# Patient Record
Sex: FEMALE | Race: BLACK OR AFRICAN AMERICAN | Employment: UNEMPLOYED | URBAN - METROPOLITAN AREA
[De-identification: names, ages, dates, MRNs, and addresses within clinical notes are randomized per-mention and may not be internally consistent; named-entity substitution may affect disease eponyms.]

---

## 2017-10-30 ENCOUNTER — APPOINTMENT (OUTPATIENT)
Dept: CT IMAGING | Age: 47
End: 2017-10-30
Attending: EMERGENCY MEDICINE
Payer: MEDICAID

## 2017-10-30 ENCOUNTER — HOSPITAL ENCOUNTER (EMERGENCY)
Age: 47
Discharge: HOME | End: 2017-10-30
Attending: EMERGENCY MEDICINE | Admitting: EMERGENCY MEDICINE
Payer: MEDICAID

## 2017-10-30 VITALS
TEMPERATURE: 98.8 F | BODY MASS INDEX: 25.61 KG/M2 | HEIGHT: 64 IN | OXYGEN SATURATION: 100 % | RESPIRATION RATE: 20 BRPM | WEIGHT: 150 LBS | SYSTOLIC BLOOD PRESSURE: 114 MMHG | HEART RATE: 77 BPM | DIASTOLIC BLOOD PRESSURE: 72 MMHG

## 2017-10-30 DIAGNOSIS — R51.9 ACUTE NONINTRACTABLE HEADACHE, UNSPECIFIED HEADACHE TYPE: Primary | ICD-10-CM

## 2017-10-30 PROCEDURE — 70450 CT HEAD/BRAIN W/O DYE: CPT

## 2017-10-30 PROCEDURE — 99282 EMERGENCY DEPT VISIT SF MDM: CPT

## 2017-10-30 RX ORDER — BUTALBITAL, ASPIRIN, CAFFEINE AND CODEINE PHOSPHATE 50; 325; 40; 30 MG/1; MG/1; MG/1; MG/1
1 CAPSULE ORAL
Qty: 20 CAP | Refills: 0 | Status: SHIPPED | OUTPATIENT
Start: 2017-10-30 | End: 2018-01-28

## 2017-10-30 NOTE — ED NOTES
Pt instructed to follow up with her PCP, take medication as prescribed, and to return for worsening pain/hemparesis/confusion/slurred speech/\"worst\" headache ever. No distress noted.

## 2017-10-30 NOTE — ED PROVIDER NOTES
HPI Comments: Pt c/o rt forehead pain. X one day, woke up w it. 10/10, says h/o freq h/a's but this feels different. No nausea. No injury. No weakness or numbness. No redness or swelling. No fever or cough. Tried motrin, not helping. Pain waxing and waning. No neck or back pain. Not pregnant per pt. Patient is a 49 y.o. female presenting with migraines. Migraine    Pertinent negatives include no fever, no shortness of breath and no vomiting. Past Medical History:   Diagnosis Date    Cigarette smoker     Dyspnea on exertion Aug 2011    Negative cardiac and pulmonary workup    Heart murmur     History of gestational diabetes     Musculoskeletal disorder     DDD    Neurological disorder     migraines       Past Surgical History:   Procedure Laterality Date    HX  SECTION      HX TUBAL LIGATION           Family History:   Problem Relation Age of Onset    Heart Attack Mother 61    Cancer Mother     Hypertension Mother     Coronary Artery Disease Mother     Cancer Other     Heart Disease Other     Stroke Other     Diabetes Other     Hypertension Other     Psychiatric Disorder Other        Social History     Social History    Marital status:      Spouse name: N/A    Number of children: N/A    Years of education: N/A     Occupational History    Not on file. Social History Main Topics    Smoking status: Current Every Day Smoker     Packs/day: 1.00     Years: 10.00    Smokeless tobacco: Never Used    Alcohol use No    Drug use: No      Comment: Past history of crack, cocaine use    Sexual activity: Yes     Partners: Male     Birth control/ protection: None     Other Topics Concern    Not on file     Social History Narrative         ALLERGIES: Review of patient's allergies indicates no known allergies. Review of Systems   Constitutional: Negative for fever. HENT: Negative for congestion. Respiratory: Negative for cough and shortness of breath. Cardiovascular: Negative for chest pain. Gastrointestinal: Negative for abdominal pain and vomiting. Musculoskeletal: Negative for back pain. Skin: Negative for rash. Neurological: Positive for headaches. Negative for light-headedness. All other systems reviewed and are negative. Vitals:    10/30/17 0102   BP: 114/72   Pulse: 77   Resp: 20   Temp: 98.8 °F (37.1 °C)   SpO2: 100%   Weight: 68 kg (150 lb)   Height: 5' 4\" (1.626 m)            Physical Exam   Constitutional: She is oriented to person, place, and time. She appears well-developed. HENT:   Head: Normocephalic. Mouth/Throat: Oropharynx is clear and moist.   Eyes: Pupils are equal, round, and reactive to light. Neck: Normal range of motion. Cardiovascular: Normal rate and normal heart sounds. No murmur heard. Pulmonary/Chest: Effort normal. She has no wheezes. She has no rales. Abdominal: Soft. There is no tenderness. Musculoskeletal: Normal range of motion. She exhibits no edema. Neurological: She is alert and oriented to person, place, and time. Skin: Skin is warm and dry. Nursing note and vitals reviewed. MDM  ED Course       Procedures    Vitals:  No data found. Medications ordered:   Medications - No data to display      Lab findings:  No results found for this or any previous visit (from the past 12 hour(s)). X-Ray, CT or other radiology findings or impressions:  CT HEAD WO CONT   Final Result          Progress notes, Consult notes or additional Procedure notes:     Long discussion with patient regarding risks/benefits of cat scan. Risks discussed including risk of cancer from radiation/fibrosis. Pt verbalizes understanding of risks and strongly wants ct, pt is very concerned regarding the pain. 2:22 AM neg w/u, no emc, stable for dc and close f/u. Det ret inst given . Disposition:  Diagnosis:   1.  Acute nonintractable headache, unspecified headache type        Disposition: home    Follow-up Information     Follow up With Details Comments [address] Schedule an appointment as soon as possible for a visit in 2 days  [address] [place] [address]  [phone]           Discharge Medication List as of 10/30/2017  2:22 AM      START taking these medications    Details   codeine-butalbital-aspirin-caffeine (FIORINAL-CODEINE #3) 45--40 mg per capsule Take 1 Cap by mouth every four (4) hours as needed for Pain for up to 90 days. Max Daily Amount: 6 Caps.  Maximum 6 capsules daily, Print, Disp-20 Cap, R-0

## 2017-10-30 NOTE — ED TRIAGE NOTES
Pt. Complains of a sharp pain in her head, states she has migraines but says this is not a migraine.

## 2017-10-30 NOTE — DISCHARGE INSTRUCTIONS
Return for pain, fever, shortness of breath, vomiting, decreased fluid intake, weakness, numbness, dizziness, or any change or concerns. Headache: Care Instructions  Your Care Instructions    Headaches have many possible causes. Most headaches aren't a sign of a more serious problem, and they will get better on their own. Home treatment may help you feel better faster. The doctor has checked you carefully, but problems can develop later. If you notice any problems or new symptoms, get medical treatment right away. Follow-up care is a key part of your treatment and safety. Be sure to make and go to all appointments, and call your doctor if you are having problems. It's also a good idea to know your test results and keep a list of the medicines you take. How can you care for yourself at home? · Do not drive if you have taken a prescription pain medicine. · Rest in a quiet, dark room until your headache is gone. Close your eyes and try to relax or go to sleep. Don't watch TV or read. · Put a cold, moist cloth or cold pack on the painful area for 10 to 20 minutes at a time. Put a thin cloth between the cold pack and your skin. · Use a warm, moist towel or a heating pad set on low to relax tight shoulder and neck muscles. · Have someone gently massage your neck and shoulders. · Take pain medicines exactly as directed. ¨ If the doctor gave you a prescription medicine for pain, take it as prescribed. ¨ If you are not taking a prescription pain medicine, ask your doctor if you can take an over-the-counter medicine. · Be careful not to take pain medicine more often than the instructions allow, because you may get worse or more frequent headaches when the medicine wears off. · Do not ignore new symptoms that occur with a headache, such as a fever, weakness or numbness, vision changes, or confusion. These may be signs of a more serious problem.   To prevent headaches  · Keep a headache diary so you can figure out what triggers your headaches. Avoiding triggers may help you prevent headaches. Record when each headache began, how long it lasted, and what the pain was like (throbbing, aching, stabbing, or dull). Write down any other symptoms you had with the headache, such as nausea, flashing lights or dark spots, or sensitivity to bright light or loud noise. Note if the headache occurred near your period. List anything that might have triggered the headache, such as certain foods (chocolate, cheese, wine) or odors, smoke, bright light, stress, or lack of sleep. · Find healthy ways to deal with stress. Headaches are most common during or right after stressful times. Take time to relax before and after you do something that has caused a headache in the past.  · Try to keep your muscles relaxed by keeping good posture. Check your jaw, face, neck, and shoulder muscles for tension, and try relaxing them. When sitting at a desk, change positions often, and stretch for 30 seconds each hour. · Get plenty of sleep and exercise. · Eat regularly and well. Long periods without food can trigger a headache. · Treat yourself to a massage. Some people find that regular massages are very helpful in relieving tension. · Limit caffeine by not drinking too much coffee, tea, or soda. But don't quit caffeine suddenly, because that can also give you headaches. · Reduce eyestrain from computers by blinking frequently and looking away from the computer screen every so often. Make sure you have proper eyewear and that your monitor is set up properly, about an arm's length away. · Seek help if you have depression or anxiety. Your headaches may be linked to these conditions. Treatment can both prevent headaches and help with symptoms of anxiety or depression. When should you call for help? Call 911 anytime you think you may need emergency care. For example, call if:  ? · You have signs of a stroke.  These may include:  ¨ Sudden numbness, paralysis, or weakness in your face, arm, or leg, especially on only one side of your body. ¨ Sudden vision changes. ¨ Sudden trouble speaking. ¨ Sudden confusion or trouble understanding simple statements. ¨ Sudden problems with walking or balance. ¨ A sudden, severe headache that is different from past headaches. ?Call your doctor now or seek immediate medical care if:  ? · You have a new or worse headache. ? · Your headache gets much worse. Where can you learn more? Go to http://magige-kelvin.info/. Enter M271 in the search box to learn more about \"Headache: Care Instructions. \"  Current as of: October 14, 2016  Content Version: 11.4  © 2829-7452 [employer], [employer]. Care instructions adapted under license by [employer] (which disclaims liability or warranty for this information). If you have questions about a medical condition or this instruction, always ask your healthcare professional. [address] any warranty or liability for your use of this information.

## 2017-10-30 NOTE — LETTER
NOTIFICATION RETURN TO WORK / SCHOOL 
 
10/30/2017 2:21 AM 
 
Ms. Joseph Garcia [address] [facility] [address] [facility] [id number] To Whom It May Concern: 
 
Joseph Garcia is currently under the care of [address] [place] [address] EMERGENCY DEPT. She will return to work/school on: 11/1/17 If there are questions or concerns please have the patient contact our office.  
 
 
 
Sincerely, 
 
 
Trini Mendoza MD

## 2018-04-04 ENCOUNTER — HOSPITAL ENCOUNTER (OUTPATIENT)
Dept: MAMMOGRAPHY | Age: 48
Discharge: HOME | End: 2018-04-04
Attending: FAMILY MEDICINE
Payer: COMMERCIAL

## 2018-04-04 DIAGNOSIS — Z12.31 VISIT FOR SCREENING MAMMOGRAM: ICD-10-CM

## 2018-04-04 PROCEDURE — 77063 BREAST TOMOSYNTHESIS BI: CPT

## 2018-05-15 ENCOUNTER — DOCUMENTATION ONLY (OUTPATIENT)
Dept: SURGERY | Age: 48
End: 2018-05-15

## 2018-05-15 NOTE — PROGRESS NOTES
Contacted pt via phone regarding her family hx of breast ca as reported on her mammography questionnaire from her recent mammogram.  Reviewed the questionnaire w/ pt and her Tyrer-Cuzick score came out to be 20%. Therefore standards indicate she should have an annual breast MRI ordered in addition to an annual Mammogram.  It is also recommended that the patient have a clinical breast exam every 6 months. Pt verbalized understanding and would like to be evaluated by one of our surgeons for the high risk breast program.  Pt requested to be seen at [facility] and therefore will be scheduled w/ Dr. Carito Ca. Pt scheduled for appt on 5/23/18 at 0900.

## 2018-06-08 ENCOUNTER — OFFICE VISIT (OUTPATIENT)
Dept: SURGERY | Age: 48
End: 2018-06-08

## 2018-06-08 VITALS
HEART RATE: 71 BPM | WEIGHT: 150 LBS | TEMPERATURE: 98.7 F | RESPIRATION RATE: 18 BRPM | SYSTOLIC BLOOD PRESSURE: 102 MMHG | DIASTOLIC BLOOD PRESSURE: 68 MMHG | BODY MASS INDEX: 25.75 KG/M2

## 2018-06-08 DIAGNOSIS — N63.21 MASS OF UPPER OUTER QUADRANT OF LEFT BREAST: Primary | ICD-10-CM

## 2018-06-08 NOTE — PROGRESS NOTES
[facility]  General Surgery    Subjective:      HPI: Patient is a very pleasant 43-year-old female with a past medical history remarkable for degenerative disc disease of the musculoskeletal system, migraine headaches, cardiac murmur, tobacco abuse, dyspnea on exertion and gestational diabetes. She is referred by Carlos Enrique Garcia of the oncology nurse navigator after being identified as having an increased risk of developing breast cancer secondary to her family history. The patient has a 20% lifetime risk of developing breast cancer according to the [address] [place] [address]. She denies any unintentional weight loss. She denies any breast pain or nipple drainage or discharge. She has noted discoloration a bruising type of greenish appearance of the upper outer quadrant of the left breast.  She states that the mammography tech's noted this as well. I reviewed her bilateral screening 3D mammogram independently on the monitor. In the upper outer quadrant there is a dense area which appears fairly well-circumscribed which may be a fibroadenoma. The patient was given a BI-RADS 1 classification to this mammogram.  I will follow-up with a diagnostic ultrasound.     Patient Active Problem List    Diagnosis Date Noted    Murmur     Backache - h/o degenerative disc disease      Past Medical History:   Diagnosis Date    Cigarette smoker     Dyspnea on exertion Aug 2011    Negative cardiac and pulmonary workup    Heart murmur     History of gestational diabetes     Musculoskeletal disorder     DDD    Neurological disorder     migraines      Past Surgical History:   Procedure Laterality Date    HX  SECTION      HX TUBAL LIGATION        Family History   Problem Relation Age of Onset    Heart Attack Mother 61    Cancer Mother     Hypertension Mother     Coronary Artery Disease Mother     Cancer Other     Heart Disease Other     Stroke Other     Diabetes Other     Hypertension Other     Psychiatric Disorder Other       Social History   Substance Use Topics    Smoking status: Current Every Day Smoker     Packs/day: 1.00     Years: 10.00    Smokeless tobacco: Never Used    Alcohol use No      No Known Allergies    Prior to Admission medications    Not on File       Review of Systems:    14 systems were reviewed. The results are as above in the HPI and otherwise negative. Objective:     Vitals:    06/08/18 0937   BP: 102/68   Pulse: 71   Resp: 18   Temp: 98.7 °F (37.1 °C)   Weight: 68 kg (150 lb)       Physical Exam:  GENERAL: alert, cooperative, no distress, appears stated age,   EYE: conjunctivae/corneas clear. PERRL, EOM's intact. THROAT & NECK: normal and no erythema or exudates noted. ,    LYMPHATIC: Cervical, supraclavicular, and axillary nodes normal. ,   LUNG: clear to auscultation bilaterally,   HEART: regular rate and rhythm, S1, S2 normal, no murmur, click, rub or gallop,   BREASTS: Left is larger than the right. 38C  Left: No dimpling, discoloration, nipple inversion or retractions. No axillary or supraclavicular lymphadenopathy. No discrete mass but the tissue in the upper outer quadrant is denser and feels thicker. Right: No dimpling, discoloration, nipple inversion or retractions. No axillary or supraclavicular lymphadenopathy. No mass  ABDOMEN: soft, non-tender. Bowel sounds normal. No masses,  no organomegaly,   EXTREMITIES:  extremities normal, atraumatic, no cyanosis or edema,   SKIN: Normal.,   NEUROLOGIC: AOx3. Cranial nerves 2-12 and sensation grossly intact. ,     Data Review:  to be done    Impression:     · Patient with an increased lifetime risk of developing breast cancer-20% secondary to family history of breast cancer in the mother. She has fullness and thickness in the left breast upper outer quadrant tissue with a bruised appearance to the skin which is concerning for possible underlying malignancy.     Plan:     · 3D diagnostic mammogram left breast to evaluate the changes in the upper outer quadrant of the left breast.  · We will call the patient with results to discuss procedure and testing.   · Monthly self breast exam

## 2018-06-08 NOTE — MR AVS SNAPSHOT
[address] [facility] [address] [facility] 
[phone] Patient: Joel Bond MRN: [id number] JBT:[id number] Visit Information Date & Time Provider Department Dept. Phone Encounter #  
 6/8/2018  9:30 AM Kraig Esposito  [address] [id number] Your Appointments 12/11/2018 10:00 AM  
Follow Up with Kraig Esposito MD  
[place] [address] ([facility]) Appt Note: 6m f/u  
 [address] [id number] [address] [facility] Upcoming Health Maintenance Date Due Pneumococcal 19-64 Medium Risk (1 of 1 - PPSV23) 4/1/1988 DTaP/Tdap/Td series (1 - Tdap) 4/1/1990 PAP AKA CERVICAL CYTOLOGY 4/1/1990 Influenza Age 5 to Adult 8/1/2018 Allergies as of 6/8/2018  Review Complete On: 6/8/2018 By: Kraig Esposito MD  
 No Known Allergies Current Immunizations  Never Reviewed Name Date  
 TB Skin Test (PPD) 1/1/2011 Not reviewed this visit You Were Diagnosed With   
  
 Codes Comments Mass of upper outer quadrant of left breast    -  Primary ICD-10-CM: Y69.42 ICD-9-CM: 611.72 Vitals BP Pulse Temp Resp Weight(growth percentile) BMI  
 102/68 71 98.7 °F (37.1 °C) 18 150 lb (68 kg) 25.75 kg/m2 OB Status Smoking Status Having regular periods Current Every Day Smoker Vitals History BMI and BSA Data Body Mass Index Body Surface Area 25.75 kg/m 2 1.75 m 2 Your Updated Medication List  
  
Notice  As of 6/8/2018 10:06 AM  
 You have not been prescribed any medications. Introducing [facility]! Nellie Akhtar introduces [employer] patient portal. Now you can access parts of your medical record, email your doctor's office, and request medication refills online. 1. In your internet browser, go to [email or web address] 2. Click on the First Time User? Click Here link in the Sign In box. You will see the New Member Sign Up page. 3. Enter your [employer] Access Code exactly as it appears below. You will not need to use this code after youve completed the sign-up process. If you do not sign up before the expiration date, you must request a new code. · [employer] Access Code: [email or web address] Expires: 6/28/2018  1:24 PM 
 
4. Enter the last four digits of your Social Security Number (xxxx) and Date of Birth (mm/dd/yyyy) as indicated and click Submit. You will be taken to the next sign-up page. 5. Create a [employer] ID. This will be your [employer] login ID and cannot be changed, so think of one that is secure and easy to remember. 6. Create a [employer] password. You can change your password at any time. 7. Enter your Password Reset Question and Answer. This can be used at a later time if you forget your password. 8. Enter your e-mail address. You will receive e-mail notification when new information is available in [address] 9. Click Sign Up. You can now view and download portions of your medical record. 10. Click the Download Summary menu link to download a portable copy of your medical information. If you have questions, please visit the Frequently Asked Questions section of the [employer] website. Remember, [employer] is NOT to be used for urgent needs. For medical emergencies, dial 911. Now available from your iPhone and Android! Please provide this summary of care documentation to your next provider. Your primary care clinician is listed as Vivek Paz . If you have any questions after today's visit, please call [phone].

## 2018-07-06 ENCOUNTER — HOSPITAL ENCOUNTER (OUTPATIENT)
Dept: ULTRASOUND IMAGING | Age: 48
Discharge: HOME | End: 2018-07-06
Attending: SURGERY
Payer: COMMERCIAL

## 2018-07-06 ENCOUNTER — HOSPITAL ENCOUNTER (OUTPATIENT)
Dept: MAMMOGRAPHY | Age: 48
Discharge: HOME | End: 2018-07-06
Attending: NURSE PRACTITIONER
Payer: COMMERCIAL

## 2018-07-06 DIAGNOSIS — N63.21 MASS OF UPPER OUTER QUADRANT OF LEFT BREAST: ICD-10-CM

## 2018-07-06 PROCEDURE — 76642 ULTRASOUND BREAST LIMITED: CPT

## 2018-07-06 PROCEDURE — 77061 BREAST TOMOSYNTHESIS UNI: CPT

## 2018-07-27 ENCOUNTER — OFFICE VISIT (OUTPATIENT)
Dept: SURGERY | Age: 48
End: 2018-07-27

## 2018-07-27 VITALS
BODY MASS INDEX: 25.44 KG/M2 | WEIGHT: 149 LBS | HEIGHT: 64 IN | SYSTOLIC BLOOD PRESSURE: 118 MMHG | RESPIRATION RATE: 17 BRPM | TEMPERATURE: 98.7 F | DIASTOLIC BLOOD PRESSURE: 62 MMHG | HEART RATE: 82 BPM | OXYGEN SATURATION: 99 %

## 2018-07-27 DIAGNOSIS — R92.8 ABNORMAL ULTRASOUND OF BREAST: Primary | ICD-10-CM

## 2018-07-27 NOTE — PROGRESS NOTES
[facility]  General Surgery    Subjective:  Patient presents today in follow-up to her left breast diagnostic mammogram and ultrasound. The ultrasound which I did review independently on the monitor was given a BI-RADS 4 classification due to a asymmetry. There was thickening of the skin of the upper outer quadrant of the left breast with some discoloration at the last visit which prompted the diagnostic studies. This thickening and discoloration are resolving. Objective:  Vitals:    07/27/18 1007   BP: 118/62   Pulse: 82   Resp: 17   Temp: 98.7 °F (37.1 °C)   TempSrc: Oral   SpO2: 99%   Weight: 67.6 kg (149 lb)   Height: 5' 4\" (1.626 m)       Physical Exam:    General: Awake and alert, oriented ×4, no apparent distress   Breasts:    Left: No dimpling, discoloration, nipple inversion or retractions. No axillary or supraclavicular lymphadenopathy. No mass     Current Medications:      Chart and notes reviewed. Data reviewed. I have evaluated and examined the patient. Impression and plan:  Patient with resolution of the skin changes of the left breast upper outer quadrant. I was unable to identify the asymmetry which was noted on the ultrasound from the [facility]. I discussed this with the radiologist present today Dr. Kendrick Banerjee. He agrees that if the skin changes are no longer present the findings present on the ultrasound and very suspicious.   Patient will follow with us in 3 months for reevaluation left breast exam.   Raina Delgado MD

## 2018-07-27 NOTE — PROGRESS NOTES
[facility]  General Surgery    Ultrasound of the left breast was performed from the 12-3  position in the upper outer quadrant. Fibrocystic changes within the breast tissue were present however I could not find the asymmetry which have been mentioned in the 1 o'clock position 9 cm from the nipple on the ultrasound report from the radiology department.

## 2018-09-09 ENCOUNTER — HOSPITAL ENCOUNTER (EMERGENCY)
Age: 48
Discharge: HOME | End: 2018-09-09
Attending: EMERGENCY MEDICINE
Payer: COMMERCIAL

## 2018-09-09 ENCOUNTER — APPOINTMENT (OUTPATIENT)
Dept: GENERAL RADIOLOGY | Age: 48
End: 2018-09-09
Attending: PHYSICIAN ASSISTANT
Payer: COMMERCIAL

## 2018-09-09 VITALS
OXYGEN SATURATION: 100 % | SYSTOLIC BLOOD PRESSURE: 110 MMHG | TEMPERATURE: 98.4 F | RESPIRATION RATE: 20 BRPM | DIASTOLIC BLOOD PRESSURE: 73 MMHG

## 2018-09-09 DIAGNOSIS — S16.1XXA CERVICAL STRAIN, ACUTE, INITIAL ENCOUNTER: Primary | ICD-10-CM

## 2018-09-09 DIAGNOSIS — S39.012A LOW BACK STRAIN, INITIAL ENCOUNTER: ICD-10-CM

## 2018-09-09 PROCEDURE — 74011250636 HC RX REV CODE- 250/636: Performed by: PHYSICIAN ASSISTANT

## 2018-09-09 PROCEDURE — 72040 X-RAY EXAM NECK SPINE 2-3 VW: CPT

## 2018-09-09 PROCEDURE — 96372 THER/PROPH/DIAG INJ SC/IM: CPT

## 2018-09-09 PROCEDURE — 99283 EMERGENCY DEPT VISIT LOW MDM: CPT

## 2018-09-09 PROCEDURE — 74011250637 HC RX REV CODE- 250/637: Performed by: PHYSICIAN ASSISTANT

## 2018-09-09 RX ORDER — CYCLOBENZAPRINE HCL 10 MG
10 TABLET ORAL
Status: COMPLETED | OUTPATIENT
Start: 2018-09-09 | End: 2018-09-09

## 2018-09-09 RX ORDER — CYCLOBENZAPRINE HCL 10 MG
10 TABLET ORAL
Qty: 15 TAB | Refills: 0 | Status: SHIPPED | OUTPATIENT
Start: 2018-09-09 | End: 2020-02-08

## 2018-09-09 RX ORDER — IBUPROFEN 200 MG
800 TABLET ORAL
COMMUNITY
End: 2019-10-03

## 2018-09-09 RX ORDER — KETOROLAC TROMETHAMINE 30 MG/ML
60 INJECTION, SOLUTION INTRAMUSCULAR; INTRAVENOUS
Status: COMPLETED | OUTPATIENT
Start: 2018-09-09 | End: 2018-09-09

## 2018-09-09 RX ADMIN — KETOROLAC TROMETHAMINE 60 MG: 30 INJECTION, SOLUTION INTRAMUSCULAR at 19:51

## 2018-09-09 RX ADMIN — CYCLOBENZAPRINE HYDROCHLORIDE 10 MG: 10 TABLET, FILM COATED ORAL at 19:51

## 2018-09-09 NOTE — ED PROVIDER NOTES
HPI Comments: Jada Motley is a 51 y.o. Female c/o back and neck pain after falling. She states she was sitting in a chair at an event when the chair broke and she fell backwards. Pt states she landed flat on her back. Pt was seen at [facility].  She states Xrays were done but she doesn't know what part of her neck or back was Xrayed. She was given Ibuprofen which she has been taking without relief. She c/o increased pain and stiffness to low back, neck, and Lt shoulder. No UE weakness, numbness, tingling, or swelling. Patient is a 51 y.o. female presenting with fall, shoulder injury, back pain, neck pain, and headaches. The history is provided by the patient. Fall The accident occurred more than 2 days ago. Fall occurred: while sitting/chair broke. She fell from a height of 1 - 2 ft. She landed on hard floor. There was no blood loss. Point of impact: back and neck. Pain location: back. The pain is at a severity of 7/10. The pain is moderate. She was ambulatory at the scene. There was no entrapment after the fall. There was no drug use involved in the accident. There was no alcohol use involved in the accident. Associated symptoms include headaches. Pertinent negatives include no visual change, no fever, no numbness, no abdominal pain, no nausea, no vomiting, no loss of consciousness and no tingling. The symptoms are aggravated by activity. She has tried NSAIDs for the symptoms. The treatment provided no relief. Shoulder Injury Pertinent negatives include no numbness and no tingling. Back Pain Associated symptoms include headaches. Pertinent negatives include no chest pain, no fever, no numbness, no abdominal pain, no tingling and no weakness. Neck Pain Associated symptoms include headaches. Pertinent negatives include no photophobia, no visual change, no chest pain, no numbness, no tingling and no weakness. Headache Pertinent negatives include no fever, no palpitations, no shortness of breath, no weakness, no tingling, no dizziness, no visual change, no nausea and no vomiting. Past Medical History:  
Diagnosis Date  Cigarette smoker  Dyspnea on exertion Aug 2011 Negative cardiac and pulmonary workup  Heart murmur  History of gestational diabetes  Musculoskeletal disorder DDD  Neurological disorder   
 migraines Past Surgical History:  
Procedure Laterality Date  HX  SECTION    
 HX TUBAL LIGATION Family History:  
Problem Relation Age of Onset  Heart Attack Mother 61  Cancer Mother  Hypertension Mother  Coronary Artery Disease Mother  Cancer Other  Heart Disease Other  Stroke Other  Diabetes Other  Hypertension Other  Psychiatric Disorder Other Social History Social History  Marital status:  Spouse name: N/A  
 Number of children: N/A  
 Years of education: N/A Occupational History  Not on file. Social History Main Topics  Smoking status: Current Every Day Smoker Packs/day: 1.00 Years: 10.00  Smokeless tobacco: Never Used  Alcohol use No  
 Drug use: No  
   Comment: Past history of crack, cocaine use  Sexual activity: Yes  
  Partners: Male Birth control/ protection: None Other Topics Concern  Not on file Social History Narrative ALLERGIES: Review of patient's allergies indicates no known allergies. Review of Systems Constitutional: Negative for appetite change, chills, diaphoresis and fever. HENT: Negative for congestion, rhinorrhea and sore throat. Eyes: Negative for photophobia and visual disturbance. Respiratory: Negative for cough and shortness of breath. Cardiovascular: Negative for chest pain and palpitations. Gastrointestinal: Negative for abdominal pain, diarrhea, nausea and vomiting. Musculoskeletal: Positive for arthralgias, back pain and neck pain. Negative for joint swelling, myalgias and neck stiffness. Skin: Negative. Allergic/Immunologic: Negative. Neurological: Positive for headaches. Negative for dizziness, tingling, loss of consciousness, syncope, weakness and numbness. Psychiatric/Behavioral: Negative. Vitals:  
 09/09/18 1853 BP: 110/73 Resp: 20 Temp: 98.4 °F (36.9 °C) SpO2: 100% Physical Exam  
Constitutional: She is oriented to person, place, and time. Vital signs are normal. She appears well-developed and well-nourished. She is cooperative. Non-toxic appearance. No distress. Pt ambulating slowly without assistance, steady gait HENT:  
Head: Normocephalic and atraumatic. Right Ear: Tympanic membrane, external ear and ear canal normal.  
Left Ear: Tympanic membrane, external ear and ear canal normal.  
Nose: Nose normal.  
Mouth/Throat: Uvula is midline, oropharynx is clear and moist and mucous membranes are normal.  
Eyes: Conjunctivae, EOM and lids are normal. Pupils are equal, round, and reactive to light. Neck: Normal range of motion and full passive range of motion without pain. Neck supple. Spinous process tenderness and muscular tenderness present. No rigidity. No edema and normal range of motion present. Cardiovascular: Normal rate, regular rhythm and normal heart sounds. Pulses: 
     Radial pulses are 2+ on the right side, and 2+ on the left side. Pulmonary/Chest: Effort normal and breath sounds normal. No respiratory distress. Abdominal: Soft. Normal appearance. There is no tenderness. There is no rebound. Musculoskeletal: Normal range of motion. She exhibits no edema. Cervical back: She exhibits tenderness, bony tenderness and spasm. She exhibits no swelling, no edema, no deformity and no laceration. Back: 
 
Midline tenderness to C6-C7, Tenderness and hypertonic muscles to LT SCM, 
 Full ROM bilat shoulders, elbows, wrists, forearms, fingers. , bicep, tricep strength 5/5 bilat Tenderness across lumbar area Lt, Rt, and midline Neurological: She is alert and oriented to person, place, and time. She has normal strength. No sensory deficit. Skin: Skin is warm and intact. No bruising, no ecchymosis and no rash noted. Psychiatric: She has a normal mood and affect. Her speech is normal and behavior is normal. Judgment and thought content normal. Cognition and memory are normal.  
Nursing note and vitals reviewed. MDM Number of Diagnoses or Management Options Cervical strain, acute, initial encounter: Low back strain, initial encounter:  
Diagnosis management comments: Pain to neck and low back 3 days s/p fall, landed directly onto back from sitting position. Continued pain, no relief with Motrin. Midline tenderness-check Cspine Xray for injury, give muscle relaxant. Likely muscle strains, no neuro/sensory deficits ED Course Procedures Vitals: 
Patient Vitals for the past 12 hrs: 
 Temp Resp BP SpO2  
09/09/18 1853 98.4 °F (36.9 °C) 20 110/73 100 % Medications ordered:  
Medications  
cyclobenzaprine (FLEXERIL) tablet 10 mg (10 mg Oral Given 9/9/18 1951)  
ketorolac tromethamine (TORADOL) 60 mg/2 mL injection 60 mg (60 mg IntraMUSCular Given 9/9/18 1951) Lab findings: 
No results found for this or any previous visit (from the past 12 hour(s)). X-Ray, CT or other radiology findings or impressions: XR SPINE CERV PA LAT ODONT 3 V MAX Final Result  
  
reviewed, no acute findings Reevaluation of patient:  
Pt resting in room, repots improved pain after Flexeril. Will discharge home. Disposition: 
Diagnosis: 1. Cervical strain, acute, initial encounter 2. Low back strain, initial encounter Disposition: home Follow-up Information Follow up With Details Comments Contact Info [id number] [facility] EMERGENCY DEPT  As needed, If symptoms worsen [address] [id number] [phone] [place], [place] Call in 2 days for re-evaluation [address] [facility] [place] [id number] 
[phone] Patient's Medications Start Taking CYCLOBENZAPRINE (FLEXERIL) 10 MG TABLET    Take 1 Tab by mouth three (3) times daily as needed for Muscle Spasm(s). Continue Taking IBUPROFEN (MOTRIN IB) 200 MG TABLET    Take 800 mg by mouth. These Medications have changed No medications on file Stop Taking No medications on file The patient will be discharged home. Warning signs of worsening condition were discussed and the patient verbalized understanding. Based on patient's age, coexisting illness, exam, and the results of this ED evaluation, the decision to treat as an outpatient was made. While it is impossible to completely exclude the possibility of underlying serious disease or worsening of condition, I feel the relative likelihood is extremely low. I discussed this uncertainty with the patient, who understood ED evaluation and treatment and felt comfortable with the outpatient treatment plan. All questions regarding care, test results, and follow up were answered. The patient is stable and appropriate to discharge. Patient understands importance to return to the emergency department for any new or worsening symptoms. I stressed the importance of follow up for repeat assessment and possibly further evaluation/treatment.  
 
Gaviota Barragan PA-C

## 2018-09-09 NOTE — ED TRIAGE NOTES
Pt states she fell out of a chair after it collapsed Thursday. Went to [facility]. Here today because she is still hurting. C/o head, neck, back and left shoulder pain. States all she was given was 800mg Motrin and all it does is make her drowsy. Also c/o left lower leg pain. Pt is here alone. States she did not drive here

## 2018-09-09 NOTE — LETTER
[facility] EMERGENCY DEPT 
[address] [facility] [id number] [phone] Work/School Note Date: 9/9/2018 To Whom It May concern: 
 
Adam Galicia was seen and treated today in the emergency room by the following provider(s): 
Attending Provider: Renaldo Arias MD 
Physician Assistant: Case Chan PA-C. Adam Galicia may return to work on 9/11/18.  
 
Sincerely, 
 
 
 
 
Case Chan PA-C

## 2018-09-10 NOTE — DISCHARGE INSTRUCTIONS

## 2018-09-10 NOTE — ED NOTES
Pt aware that Flexeril will make her drowsy and she will not be discharged until her ride has arrived to drive her home. Pt verbalized understanding and states that her son will be picking her up.

## 2018-09-10 NOTE — ED NOTES
I have reviewed discharge instructions with the patient. The patient verbalized understanding. Patient armband removed and given to patient to take home. Patient was informed of the privacy risks if armband lost or stolen Pt accompanied by son who is driving her home. Pt again instructed not to drive  While taking Flexeril.

## 2018-09-21 ENCOUNTER — TELEPHONE (OUTPATIENT)
Dept: SURGERY | Age: 48
End: 2018-09-21

## 2018-09-21 NOTE — TELEPHONE ENCOUNTER
Received letter from [facility] Radiologists recommending biopsy on patient. Patient was seen by Dr. Letha Wilcox on 7/27/2018 and is currently being followed by surgery.

## 2019-01-14 ENCOUNTER — HOSPITAL ENCOUNTER (EMERGENCY)
Age: 49
Discharge: HOME | End: 2019-01-14
Attending: EMERGENCY MEDICINE
Payer: COMMERCIAL

## 2019-01-14 VITALS
BODY MASS INDEX: 25.61 KG/M2 | DIASTOLIC BLOOD PRESSURE: 74 MMHG | HEART RATE: 87 BPM | SYSTOLIC BLOOD PRESSURE: 114 MMHG | RESPIRATION RATE: 16 BRPM | HEIGHT: 64 IN | OXYGEN SATURATION: 99 % | WEIGHT: 150 LBS | TEMPERATURE: 98.6 F

## 2019-01-14 DIAGNOSIS — M54.41 ACUTE MIDLINE LOW BACK PAIN WITH RIGHT-SIDED SCIATICA: Primary | ICD-10-CM

## 2019-01-14 PROCEDURE — 99282 EMERGENCY DEPT VISIT SF MDM: CPT

## 2019-01-14 RX ORDER — PREDNISONE 5 MG/1
TABLET ORAL
Qty: 21 TAB | Refills: 0 | Status: SHIPPED | OUTPATIENT
Start: 2019-01-14 | End: 2019-10-03

## 2019-01-14 RX ORDER — HYDROCODONE BITARTRATE AND ACETAMINOPHEN 5; 325 MG/1; MG/1
1 TABLET ORAL
Qty: 8 TAB | Refills: 0 | Status: SHIPPED | OUTPATIENT
Start: 2019-01-14 | End: 2019-10-03

## 2019-01-15 NOTE — ED TRIAGE NOTES
Patient presents to ER for C/O acute on chronic back pain. States back pain worsened on Friday. States she was injured in September, states she was diagnosed with 5 bulging disks.

## 2019-01-15 NOTE — ED PROVIDER NOTES
EMERGENCY DEPARTMENT HISTORY AND PHYSICAL EXAM 
 
9:28 PM 
 
 
Date: (Not on file) Patient Name: Kiara Escobar History of Presenting Illness Chief Complaint Patient presents with  Back Pain History Provided By: Patient Chief Complaint: back pain Duration: 3 Days Timing:  Acute on chronic Location: middle lower Quality: Aching Severity: 10 out of 10 Modifying Factors: motrin and flexeril Associated Symptoms: denies any other associated signs or symptoms Additional History (Context): Kiara Escobar is a 51 y.o. female with No significant past medical history who presents with back pain. She has history of work related injury and was diagnosed with 5 bulging discs. She has been seeing Dr. Cachorro Graham, spine specialist, and PT. The pain started worsening this weekend. Pain does not radiate into the legs. Patient denies incontinence, numbness in the groin, weakness/ numbness in the lower extremities. Denies dysuria. PCP: Deysi Arellano MD 
 
Current Outpatient Medications Medication Sig Dispense Refill  ibuprofen (MOTRIN IB) 200 mg tablet Take 800 mg by mouth.  cyclobenzaprine (FLEXERIL) 10 mg tablet Take 1 Tab by mouth three (3) times daily as needed for Muscle Spasm(s). 15 Tab 0 Past History Past Medical History: 
Past Medical History:  
Diagnosis Date  Cigarette smoker  Dyspnea on exertion Aug 2011 Negative cardiac and pulmonary workup  Heart murmur  History of gestational diabetes  Musculoskeletal disorder DDD  Neurological disorder   
 migraines Past Surgical History: 
Past Surgical History:  
Procedure Laterality Date  HX  SECTION    
 HX TUBAL LIGATION Family History: 
Family History Problem Relation Age of Onset  Heart Attack Mother 61  Cancer Mother  Hypertension Mother  Coronary Artery Disease Mother  Cancer Other  Heart Disease Other  Stroke Other  Diabetes Other  Hypertension Other  Psychiatric Disorder Other Social History: 
Social History Tobacco Use  Smoking status: Current Every Day Smoker Packs/day: 1.00 Years: 10.00 Pack years: 10.00  Smokeless tobacco: Never Used Substance Use Topics  Alcohol use: No  
 Drug use: No  
  Comment: Past history of crack, cocaine use Allergies: 
No Known Allergies Review of Systems Review of Systems Constitutional: Negative for fever. HENT: Negative for facial swelling. Eyes: Negative for visual disturbance. Respiratory: Negative for shortness of breath. Cardiovascular: Negative for chest pain. Gastrointestinal: Negative for abdominal pain. Genitourinary: Negative for dysuria. Musculoskeletal: Positive for back pain. Negative for neck pain. Skin: Negative for rash. Neurological: Negative for dizziness. Psychiatric/Behavioral: Negative for confusion. All other systems reviewed and are negative. Physical Exam  
There were no vitals taken for this visit. Physical Exam  
Constitutional: She is oriented to person, place, and time. She appears well-developed and well-nourished. No distress. HENT:  
Head: Normocephalic and atraumatic. Eyes: Conjunctivae are normal.  
Neck: Normal range of motion. Cardiovascular: Normal rate and regular rhythm. Pulmonary/Chest: Effort normal.  
Abdominal: She exhibits no distension. Musculoskeletal: Normal range of motion. Tenderness to lumbar spine, left lower back, and left buttocks.  + SLRT on left. Neurological: She is alert and oriented to person, place, and time. No sensory or motor deficits. Strength and sensation equal to bilateral upper and lower extremities. Skin: Skin is warm and dry. She is not diaphoretic. Psychiatric: She has a normal mood and affect. Nursing note and vitals reviewed. Diagnostic Study Results Labs - 
 No results found for this or any previous visit (from the past 12 hour(s)). Radiologic Studies - No orders to display Medical Decision Making I am the first provider for this patient. I reviewed the vital signs, available nursing notes, past medical history, past surgical history, family history and social history. Vital Signs-Reviewed the patient's vital signs. Records Reviewed: Nursing Notes (Time of Review: 9:28 PM) ED Course: Progress Notes, Reevaluation, and Consults: 
 
Provider Notes (Medical Decision Making): MDM Number of Diagnoses or Management Options Acute midline low back pain with right-sided sciatica:  
 
  
 
 Patient has no new complaints, changes, or physical findings. Care plan outlined and precautions discussed. Results were reviewed with the patient. All medications were reviewed with the patient; will d/c home with steroids and pain meds. All of pt's questions and concerns were addressed. Patient was instructed and agrees to follow up with ortho spine, as well as to return to the ED upon further deterioration. Patient is ready to go home. Diagnosis Clinical Impression: No diagnosis found. Disposition:  
 
 
Follow-up Information None Medication List  
  
ASK your doctor about these medications   
cyclobenzaprine 10 mg tablet Commonly known as:  FLEXERIL Take 1 Tab by mouth three (3) times daily as needed for Muscle Spasm(s). MOTRIN  mg tablet Generic drug:  ibuprofen 
  
  
 
_______________________________ Attestations: 
Scribe Attestation Robby Kendrick PA-C acting as a scribe for and in the presence of [facility]/[place], [place] January 14, 2019 at 9:28 PM 
    
Provider Attestation:     
I personally performed the services described in the documentation, reviewed the documentation, as recorded by the scribe in my presence, and it accurately and completely records my words and actions.  January 14, 2019 at 9:28 STAR Diaz PA-C 
_______________________________

## 2019-01-15 NOTE — DISCHARGE INSTRUCTIONS
Patient Education        Learning About Relief for Back Pain  What is back tension and strain? Back strain happens when you overstretch, or pull, a muscle in your back. You may hurt your back in an accident or when you exercise or lift something. Most back pain will get better with rest and time. You can take care of yourself at home to help your back heal.  What can you do first to relieve back pain? When you first feel back pain, try these steps:  · Walk. Take a short walk (10 to 20 minutes) on a level surface (no slopes, hills, or stairs) every 2 to 3 hours. Walk only distances you can manage without pain, especially leg pain. · Relax. Find a comfortable position for rest. Some people are comfortable on the floor or a medium-firm bed with a small pillow under their head and another under their knees. Some people prefer to lie on their side with a pillow between their knees. Don't stay in one position for too long. · Try heat or ice. Try using a heating pad on a low or medium setting, or take a warm shower, for 15 to 20 minutes every 2 to 3 hours. Or you can buy single-use heat wraps that last up to 8 hours. You can also try an ice pack for 10 to 15 minutes every 2 to 3 hours. You can use an ice pack or a bag of frozen vegetables wrapped in a thin towel. There is not strong evidence that either heat or ice will help, but you can try them to see if they help. You may also want to try switching between heat and cold. · Take pain medicine exactly as directed. ¨ If the doctor gave you a prescription medicine for pain, take it as prescribed. ¨ If you are not taking a prescription pain medicine, ask your doctor if you can take an over-the-counter medicine. What else can you do? · Stretch and exercise. Exercises that increase flexibility may relieve your pain and make it easier for your muscles to keep your spine in a good, neutral position. And don't forget to keep walking. · Do self-massage.  You can use self-massage to unwind after work or school or to energize yourself in the morning. You can easily massage your feet, hands, or neck. Self-massage works best if you are in comfortable clothes and are sitting or lying in a comfortable position. Use oil or lotion to massage bare skin. · Reduce stress. Back pain can lead to a vicious [place]: Distress about the pain tenses the muscles in your back, which in turn causes more pain. Learn how to relax your mind and your muscles to lower your stress. Where can you learn more? Go to http://maggie-kelvin.info/. Enter U242 in the search box to learn more about \"Learning About Relief for Back Pain. \"  Current as of: March 21, 2017  Content Version: 11.5  © 1038-2216 [employer]. Care instructions adapted under license by [employer] (which disclaims liability or warranty for this information). If you have questions about a medical condition or this instruction, always ask your healthcare professional. [address] any warranty or liability for your use of this information.

## 2019-03-14 ENCOUNTER — APPOINTMENT (OUTPATIENT)
Dept: GENERAL RADIOLOGY | Age: 49
End: 2019-03-14
Attending: EMERGENCY MEDICINE
Payer: SELF-PAY

## 2019-03-14 ENCOUNTER — HOSPITAL ENCOUNTER (EMERGENCY)
Age: 49
Discharge: HOME | End: 2019-03-14
Attending: EMERGENCY MEDICINE
Payer: SELF-PAY

## 2019-03-14 VITALS
RESPIRATION RATE: 20 BRPM | SYSTOLIC BLOOD PRESSURE: 110 MMHG | DIASTOLIC BLOOD PRESSURE: 73 MMHG | OXYGEN SATURATION: 100 % | TEMPERATURE: 97.8 F | HEART RATE: 85 BPM

## 2019-03-14 DIAGNOSIS — G89.29 CHRONIC LOW BACK PAIN WITHOUT SCIATICA, UNSPECIFIED BACK PAIN LATERALITY: ICD-10-CM

## 2019-03-14 DIAGNOSIS — R42 LIGHTHEADEDNESS: Primary | ICD-10-CM

## 2019-03-14 DIAGNOSIS — M54.50 CHRONIC LOW BACK PAIN WITHOUT SCIATICA, UNSPECIFIED BACK PAIN LATERALITY: ICD-10-CM

## 2019-03-14 PROCEDURE — 93005 ELECTROCARDIOGRAM TRACING: CPT

## 2019-03-14 PROCEDURE — 71045 X-RAY EXAM CHEST 1 VIEW: CPT

## 2019-03-14 PROCEDURE — 99285 EMERGENCY DEPT VISIT HI MDM: CPT

## 2019-03-14 NOTE — ED TRIAGE NOTES
Patient arrived via EMS from home c/o back pain that radiates down her left leg. Patient states that she was told back in October 2018 that she had 5 bulging disk. Patient states that she is 10/10 pain in her back with tingling in all extremities. Per EMS, patient was hyperventilating when they arrived and was tachycardic. Patient states that she does not have any history of anxiety.

## 2019-03-14 NOTE — ED PROVIDER NOTES
EMERGENCY DEPARTMENT HISTORY AND PHYSICAL EXAM    7:50 PM  Date: 3/14/2019  Patient Name: Shruthi Oropeza    History of Presenting Illness     Chief Complaint:Lower back pain    History Provided By: Patient     HPI: Shruthi Oropeza is a 51 y.o. female with a past MHx of neurological disorder, and dyspnea on exertion who presents to the ED c/o of constant, unchanged ower back pain and SOB onset 4:30 PM. The pt stated that she was at work when she had SOB and started to hyperventilate. She stated she also almost had a syncope episode. She was able to drive herself back home. Because of her chronic back pain, she has recently been receiving injections in her lower back. Denies CP and n/v    PCP: Peyton Ferrer MD    This was created with voice recognition software and transcription errors may be present.        Past History     Past Medical History:  Past Medical History:   Diagnosis Date    Cigarette smoker     Dyspnea on exertion Aug 2011    Negative cardiac and pulmonary workup    Heart murmur     History of gestational diabetes     Musculoskeletal disorder     DDD    Neurological disorder     migraines       Past Surgical History:  Past Surgical History:   Procedure Laterality Date    HX  SECTION      HX TUBAL LIGATION         Family History:  Family History   Problem Relation Age of Onset    Heart Attack Mother 61    Cancer Mother     Hypertension Mother     Coronary Artery Disease Mother     Cancer Other     Heart Disease Other     Stroke Other     Diabetes Other     Hypertension Other     Psychiatric Disorder Other        Social History:  Social History     Tobacco Use    Smoking status: Current Every Day Smoker     Packs/day: 1.00     Years: 10.00     Pack years: 10.00    Smokeless tobacco: Never Used   Substance Use Topics    Alcohol use: No    Drug use: No     Comment: Past history of crack, cocaine use       Allergies:  No Known Allergies    Review of Systems     Review of Systems   Respiratory: Positive for shortness of breath. Cardiovascular: Negative for chest pain. Gastrointestinal: Negative for nausea and vomiting. Musculoskeletal: Positive for back pain (Lower). All other systems reviewed and are negative. Physical Exam       Physical Exam   Constitutional: She is oriented to person, place, and time. She appears well-developed. HENT:   Head: Normocephalic and atraumatic. Eyes: EOM are normal. Pupils are equal, round, and reactive to light. Neck: Normal range of motion. Neck supple. Cardiovascular: Normal rate, regular rhythm and normal heart sounds. Exam reveals no friction rub. No murmur heard. Pulmonary/Chest: Effort normal and breath sounds normal. No respiratory distress. She has no wheezes. Abdominal: Soft. She exhibits no distension. There is no tenderness. There is no rebound and no guarding. Musculoskeletal: Normal range of motion. Minor low back pain; lumbar; paraspinous. Neurological: She is alert and oriented to person, place, and time. Skin: Skin is warm and dry. Psychiatric: She has a normal mood and affect. Her behavior is normal. Thought content normal.       Diagnostic Study Results     Vital Signs: There were no vitals taken for this visit. EKG: EKG shows sinus rhythm with a rate of 85 with a normal axis and normal there is no ST elevation or depression or hypertrophy    Labs: none    Imaging: napd. Medical Decision Making     ED Course: Progress Notes, Reevaluation, and Consults:      Provider Notes (Medical Decision Making): This is a 50-year-old female who presents with hyperventilation and feeling like she is going to pass out. No significant headache chest pain abdominal pain or shortness of breath prior to or after the events. She does have a history of chronic low back pain that is unchanged.   Her chest x-ray is unremarkable         Diagnosis     Clinical Impression: No diagnosis found.    Disposition:       Medication List      ASK your doctor about these medications    cyclobenzaprine 10 mg tablet  Commonly known as:  FLEXERIL  Take 1 Tab by mouth three (3) times daily as needed for Muscle Spasm(s). HYDROcodone-acetaminophen 5-325 mg per tablet  Commonly known as:  NORCO  Take 1 Tab by mouth every four (4) hours as needed for Pain. Max Daily Amount: 6 Tabs. MOTRIN  mg tablet  Generic drug:  ibuprofen     predniSONE 5 mg dose pack  Commonly known as:  STERAPRED  See administration instruction per 5mg dose pack          _______________________________    Attestations:  Scribe Attestation      I-Rachael Rod acting as a scribe for and in the presence of Marleny Franco MD      March 14, 2019 at 7:50 PM       Provider Attestation:      I personally performed the services described in the documentation, reviewed the documentation, as recorded by the scribe in my presence, and it accurately and completely records my words and actions.  March 14, 2019 at 7:50 PM - Marleny Franco MD    _______________________________

## 2019-03-15 LAB
ATRIAL RATE: 85 BPM
CALCULATED P AXIS, ECG09: 47 DEGREES
CALCULATED R AXIS, ECG10: 1 DEGREES
CALCULATED T AXIS, ECG11: 30 DEGREES
DIAGNOSIS, 93000: NORMAL
P-R INTERVAL, ECG05: 144 MS
Q-T INTERVAL, ECG07: 390 MS
QRS DURATION, ECG06: 84 MS
QTC CALCULATION (BEZET), ECG08: 464 MS
VENTRICULAR RATE, ECG03: 85 BPM

## 2019-03-15 NOTE — ED NOTES
I have reviewed discharge instructions with the patient. The patient verbalized understanding.  Pt taken to lobby in wheelchair

## 2019-03-15 NOTE — DISCHARGE INSTRUCTIONS
Patient Education        Back Pain: Care Instructions  Your Care Instructions    Back pain has many possible causes. It is often related to problems with muscles and ligaments of the back. It may also be related to problems with the nerves, discs, or bones of the back. Moving, lifting, standing, sitting, or sleeping in an awkward way can strain the back. Sometimes you don't notice the injury until later. Arthritis is another common cause of back pain. Although it may hurt a lot, back pain usually improves on its own within several weeks. Most people recover in 12 weeks or less. Using good home treatment and being careful not to stress your back can help you feel better sooner. Follow-up care is a key part of your treatment and safety. Be sure to make and go to all appointments, and call your doctor if you are having problems. It's also a good idea to know your test results and keep a list of the medicines you take. How can you care for yourself at home? · Sit or lie in positions that are most comfortable and reduce your pain. Try one of these positions when you lie down:  ? Lie on your back with your knees bent and supported by large pillows. ? Lie on the floor with your legs on the seat of a sofa or chair. ? Lie on your side with your knees and hips bent and a pillow between your legs. ? Lie on your stomach if it does not make pain worse. · Do not sit up in bed, and avoid soft couches and twisted positions. Bed rest can help relieve pain at first, but it delays healing. Avoid bed rest after the first day of back pain. · Change positions every 30 minutes. If you must sit for long periods of time, take breaks from sitting. Get up and walk around, or lie in a comfortable position. · Try using a heating pad on a low or medium setting for 15 to 20 minutes every 2 or 3 hours. Try a warm shower in place of one session with the heating pad. · You can also try an ice pack for 10 to 15 minutes every 2 to 3 hours. Put a thin cloth between the ice pack and your skin. · Take pain medicines exactly as directed. ? If the doctor gave you a prescription medicine for pain, take it as prescribed. ? If you are not taking a prescription pain medicine, ask your doctor if you can take an over-the-counter medicine. · Take short walks several times a day. You can start with 5 to 10 minutes, 3 or 4 times a day, and work up to longer walks. Walk on level surfaces and avoid hills and stairs until your back is better. · Return to work and other activities as soon as you can. Continued rest without activity is usually not good for your back. · To prevent future back pain, do exercises to stretch and strengthen your back and stomach. Learn how to use good posture, safe lifting techniques, and proper body mechanics. When should you call for help? Call your doctor now or seek immediate medical care if:    · You have new or worsening numbness in your legs.     · You have new or worsening weakness in your legs. (This could make it hard to stand up.)     · You lose control of your bladder or bowels.    Watch closely for changes in your health, and be sure to contact your doctor if:    · You have a fever, lose weight, or don't feel well.     · You do not get better as expected. Where can you learn more? Go to http://maggie-kelvin.info/. Enter L646 in the search box to learn more about \"Back Pain: Care Instructions. \"  Current as of: September 20, 2018  Content Version: 11.9  © 1562-2333 Healthwise, Incorporated. Care instructions adapted under license by [employer] (which disclaims liability or warranty for this information). If you have questions about a medical condition or this instruction, always ask your healthcare professional. [place] [address] any warranty or liability for your use of this information.          Patient Education        Lightheadedness or Faintness: Care Instructions  Your Care Instructions  Lightheadedness is a feeling that you are about to faint or \"pass out. \" You do not feel as if you or your surroundings are moving. It is different from vertigo, which is the feeling that you or things around you are spinning or tilting. Lightheadedness usually goes away or gets better when you lie down. If lightheadedness gets worse, it can lead to a fainting spell. It is common to feel lightheaded from time to time. Lightheadedness usually is not caused by a serious problem. It often is caused by a short-lasting drop in blood pressure and blood flow to your head that occurs when you get up too quickly from a seated or lying position. Follow-up care is a key part of your treatment and safety. Be sure to make and go to all appointments, and call your doctor if you are having problems. It's also a good idea to know your test results and keep a list of the medicines you take. How can you care for yourself at home? · Lie down for 1 or 2 minutes when you feel lightheaded. After lying down, sit up slowly and remain sitting for 1 to 2 minutes before slowly standing up. · Avoid movements, positions, or activities that have made you lightheaded in the past.  · Get plenty of rest, especially if you have a cold or flu, which can cause lightheadedness. · Make sure you drink plenty of fluids, especially if you have a fever or have been sweating. · Do not drive or put yourself and others in danger while you feel lightheaded. When should you call for help? Call 911 anytime you think you may need emergency care. For example, call if:    · You have symptoms of a stroke. These may include:  ? Sudden numbness, tingling, weakness, or loss of movement in your face, arm, or leg, especially on only one side of your body. ? Sudden vision changes. ? Sudden trouble speaking. ? Sudden confusion or trouble understanding simple statements. ? Sudden problems with walking or balance. ?  A sudden, severe headache that is different from past headaches.     · You have symptoms of a heart attack. These may include:  ? Chest pain or pressure, or a strange feeling in the chest.  ? Sweating. ? Shortness of breath. ? Nausea or vomiting. ? Pain, pressure, or a strange feeling in the back, neck, jaw, or upper belly or in one or both shoulders or arms. ? Lightheadedness or sudden weakness. ? A fast or irregular heartbeat. After you call 911, the  may tell you to chew 1 adult-strength or 2 to 4 low-dose aspirin. Wait for an ambulance. Do not try to drive yourself.    Watch closely for changes in your health, and be sure to contact your doctor if:    · Your lightheadedness gets worse or does not get better with home care. Where can you learn more? Go to http://maggie-kelvin.info/. Enter K750 in the search box to learn more about \"Lightheadedness or Faintness: Care Instructions. \"  Current as of: September 23, 2018  Content Version: 11.9  © 3792-3143 Healthwise, Incorporated. Care instructions adapted under license by [employer] (which disclaims liability or warranty for this information). If you have questions about a medical condition or this instruction, always ask your healthcare professional. [place] [address] any warranty or liability for your use of this information.

## 2019-10-03 ENCOUNTER — HOSPITAL ENCOUNTER (EMERGENCY)
Age: 49
Discharge: HOME | End: 2019-10-03
Attending: EMERGENCY MEDICINE
Payer: SELF-PAY

## 2019-10-03 VITALS
TEMPERATURE: 98.6 F | WEIGHT: 150 LBS | RESPIRATION RATE: 18 BRPM | BODY MASS INDEX: 25.61 KG/M2 | HEART RATE: 94 BPM | OXYGEN SATURATION: 100 % | HEIGHT: 64 IN | SYSTOLIC BLOOD PRESSURE: 113 MMHG | DIASTOLIC BLOOD PRESSURE: 61 MMHG

## 2019-10-03 DIAGNOSIS — D64.9 ANEMIA, UNSPECIFIED TYPE: ICD-10-CM

## 2019-10-03 DIAGNOSIS — B34.9 VIRAL ILLNESS: Primary | ICD-10-CM

## 2019-10-03 LAB
ANION GAP SERPL CALC-SCNC: 5 MMOL/L (ref 3–18)
BASOPHILS # BLD: 0 K/UL (ref 0–0.1)
BASOPHILS NFR BLD: 0 % (ref 0–2)
BUN SERPL-MCNC: 7 MG/DL (ref 7–18)
BUN/CREAT SERPL: 9 (ref 12–20)
CALCIUM SERPL-MCNC: 9.5 MG/DL (ref 8.5–10.1)
CHLORIDE SERPL-SCNC: 107 MMOL/L (ref 100–111)
CO2 SERPL-SCNC: 28 MMOL/L (ref 21–32)
CREAT SERPL-MCNC: 0.78 MG/DL (ref 0.6–1.3)
DIFFERENTIAL METHOD BLD: ABNORMAL
EOSINOPHIL # BLD: 0.1 K/UL (ref 0–0.4)
EOSINOPHIL NFR BLD: 1 % (ref 0–5)
ERYTHROCYTE [DISTWIDTH] IN BLOOD BY AUTOMATED COUNT: 17.5 % (ref 11.6–14.5)
FLUAV AG NPH QL IA: NEGATIVE
FLUBV AG NOSE QL IA: NEGATIVE
GLUCOSE SERPL-MCNC: 102 MG/DL (ref 74–99)
HCT VFR BLD AUTO: 35.9 % (ref 35–45)
HGB BLD-MCNC: 10.8 G/DL (ref 12–16)
LYMPHOCYTES # BLD: 1.7 K/UL (ref 0.9–3.6)
LYMPHOCYTES NFR BLD: 19 % (ref 21–52)
MCH RBC QN AUTO: 22.1 PG (ref 24–34)
MCHC RBC AUTO-ENTMCNC: 30.1 G/DL (ref 31–37)
MCV RBC AUTO: 73.4 FL (ref 74–97)
MONOCYTES # BLD: 0.5 K/UL (ref 0.05–1.2)
MONOCYTES NFR BLD: 6 % (ref 3–10)
NEUTS SEG # BLD: 6.7 K/UL (ref 1.8–8)
NEUTS SEG NFR BLD: 74 % (ref 40–73)
PLATELET # BLD AUTO: 450 K/UL (ref 135–420)
PLATELET COMMENTS,PCOM: ABNORMAL
PMV BLD AUTO: 9.1 FL (ref 9.2–11.8)
POTASSIUM SERPL-SCNC: 3.9 MMOL/L (ref 3.5–5.5)
RBC # BLD AUTO: 4.89 M/UL (ref 4.2–5.3)
RBC MORPH BLD: ABNORMAL
SODIUM SERPL-SCNC: 140 MMOL/L (ref 136–145)
WBC # BLD AUTO: 9 K/UL (ref 4.6–13.2)

## 2019-10-03 PROCEDURE — 74011250636 HC RX REV CODE- 250/636: Performed by: PHYSICIAN ASSISTANT

## 2019-10-03 PROCEDURE — 80048 BASIC METABOLIC PNL TOTAL CA: CPT

## 2019-10-03 PROCEDURE — 96375 TX/PRO/DX INJ NEW DRUG ADDON: CPT

## 2019-10-03 PROCEDURE — 96374 THER/PROPH/DIAG INJ IV PUSH: CPT

## 2019-10-03 PROCEDURE — 99284 EMERGENCY DEPT VISIT MOD MDM: CPT

## 2019-10-03 PROCEDURE — 87804 INFLUENZA ASSAY W/OPTIC: CPT

## 2019-10-03 PROCEDURE — 85025 COMPLETE CBC W/AUTO DIFF WBC: CPT

## 2019-10-03 RX ORDER — ONDANSETRON 2 MG/ML
4 INJECTION INTRAMUSCULAR; INTRAVENOUS
Status: COMPLETED | OUTPATIENT
Start: 2019-10-03 | End: 2019-10-03

## 2019-10-03 RX ORDER — IBUPROFEN 600 MG/1
600 TABLET ORAL
Qty: 20 TAB | Refills: 0 | Status: SHIPPED | OUTPATIENT
Start: 2019-10-03 | End: 2021-06-15

## 2019-10-03 RX ORDER — ONDANSETRON 4 MG/1
TABLET, ORALLY DISINTEGRATING ORAL
Qty: 10 TAB | Refills: 0 | Status: SHIPPED | OUTPATIENT
Start: 2019-10-03 | End: 2020-02-08

## 2019-10-03 RX ORDER — KETOROLAC TROMETHAMINE 15 MG/ML
15 INJECTION, SOLUTION INTRAMUSCULAR; INTRAVENOUS
Status: DISCONTINUED | OUTPATIENT
Start: 2019-10-03 | End: 2019-10-03 | Stop reason: CLARIF

## 2019-10-03 RX ORDER — KETOROLAC TROMETHAMINE 15 MG/ML
15 INJECTION, SOLUTION INTRAMUSCULAR; INTRAVENOUS
Status: COMPLETED | OUTPATIENT
Start: 2019-10-03 | End: 2019-10-03

## 2019-10-03 RX ORDER — BENZONATATE 100 MG/1
100 CAPSULE ORAL
Qty: 30 CAP | Refills: 0 | Status: SHIPPED | OUTPATIENT
Start: 2019-10-03 | End: 2019-10-10

## 2019-10-03 RX ADMIN — KETOROLAC TROMETHAMINE 15 MG: 15 INJECTION, SOLUTION INTRAMUSCULAR; INTRAVENOUS at 12:12

## 2019-10-03 RX ADMIN — SODIUM CHLORIDE 1000 ML: 900 INJECTION, SOLUTION INTRAVENOUS at 11:53

## 2019-10-03 RX ADMIN — ONDANSETRON 4 MG: 2 INJECTION INTRAMUSCULAR; INTRAVENOUS at 11:54

## 2019-10-03 NOTE — ED TRIAGE NOTES
Pt presents with generalized body aches, nasal congestion, cough, headache and nausea x 3 days. Pt states had 1 episode of vomiting yesterday. Pt denies fevers, denies diarrhea.

## 2019-10-03 NOTE — DISCHARGE INSTRUCTIONS
Patient Education        Anemia: Care Instructions  Your Care Instructions    Anemia is a low level of red blood cells, which carry oxygen throughout your body. Many things can cause anemia. Lack of iron is one of the most common causes. Your body needs iron to make hemoglobin, a substance in red blood cells that carries oxygen from the lungs to your body's cells. Without enough iron, the body produces fewer and smaller red blood cells. As a result, your body's cells do not get enough oxygen, and you feel tired and weak. And you may have trouble concentrating. Bleeding is the most common cause of a lack of iron. You may have heavy menstrual bleeding or bleeding caused by conditions such as ulcers, hemorrhoids, or cancer. Regular use of aspirin or other anti-inflammatory medicines (such as ibuprofen) also can cause bleeding in some people. A lack of iron in your diet also can cause anemia, especially at times when the body needs more iron, such as during pregnancy, infancy, and the teen years. Your doctor may have prescribed iron pills. It may take several months of treatment for your iron levels to return to normal. Your doctor also may suggest that you eat foods that are rich in iron, such as meat and beans. There are many other causes of anemia. It is not always due to a lack of iron. Finding the specific cause of your anemia will help your doctor find the right treatment for you. Follow-up care is a key part of your treatment and safety. Be sure to make and go to all appointments, and call your doctor if you are having problems. It's also a good idea to know your test results and keep a list of the medicines you take. How can you care for yourself at home? · Take your medicines exactly as prescribed. Call your doctor if you think you are having a problem with your medicine.   · If your doctor recommends iron pills, take them as directed:  ? Try to take the pills on an empty stomach about 1 hour before or 2 hours after meals. But you may need to take iron with food to avoid an upset stomach. ? Do not take antacids or drink milk or caffeine drinks (such as coffee, tea, or cola) at the same time or within 2 hours of the time that you take your iron. They can make it hard for your body to absorb the iron. ? Vitamin C (from food or supplements) helps your body absorb iron. Try taking iron pills with a glass of orange juice or some other food that is high in vitamin C, such as citrus fruits. ? Iron pills may cause stomach problems, such as heartburn, nausea, diarrhea, constipation, and cramps. Be sure to drink plenty of fluids, and include fruits, vegetables, and fiber in your diet each day. Iron pills often make your bowel movements dark or green. ? If you forget to take an iron pill, do not take a double dose of iron the next time you take a pill. ? Keep iron pills out of the reach of small children. An overdose of iron can be very dangerous. · Follow your doctor's advice about eating iron-rich foods. These include red meat, shellfish, poultry, eggs, beans, raisins, whole-grain bread, and leafy green vegetables. · Steam vegetables to help them keep their iron content. When should you call for help? Call 911 anytime you think you may need emergency care. For example, call if:    · You have symptoms of a heart attack. These may include:  ? Chest pain or pressure, or a strange feeling in the chest.  ? Sweating. ? Shortness of breath. ? Nausea or vomiting. ? Pain, pressure, or a strange feeling in the back, neck, jaw, or upper belly or in one or both shoulders or arms. ? Lightheadedness or sudden weakness. ? A fast or irregular heartbeat. After you call 911, the  may tell you to chew 1 adult-strength or 2 to 4 low-dose aspirin. Wait for an ambulance.  Do not try to drive yourself.     · You passed out (lost consciousness).    Call your doctor now or seek immediate medical care if:    · You have new or increased shortness of breath.     · You are dizzy or lightheaded, or you feel like you may faint.     · Your fatigue and weakness continue or get worse.     · You have any abnormal bleeding, such as:  ? Nosebleeds. ? Vaginal bleeding that is different (heavier, more frequent, at a different time of the month) than what you are used to.  ? Bloody or black stools, or rectal bleeding. ? Bloody or pink urine.    Watch closely for changes in your health, and be sure to contact your doctor if:    · You do not get better as expected. Where can you learn more? Go to http://maggie-kelvin.info/. Enter R301 in the search box to learn more about \"Anemia: Care Instructions. \"  Current as of: March 28, 2019  Content Version: 12.2  © 2167-0748 [employer]. Care instructions adapted under license by [employer] (which disclaims liability or warranty for this information). If you have questions about a medical condition or this instruction, always ask your healthcare professional. [place] [address] any warranty or liability for your use of this information. Patient Education        Viral Infections: Care Instructions  Your Care Instructions    You don't feel well, but it's not clear what's causing it. You may have a viral infection. Viruses cause many illnesses, such as the common cold, influenza, fever, rashes, and the diarrhea, nausea, and vomiting that are often called \"stomach flu. \" You may wonder if antibiotic medicines could make you feel better. But antibiotics only treat infections caused by bacteria. They don't work on viruses. The good news is that viral infections usually aren't serious. Most will go away in a few days without medical treatment. In the meantime, there are a few things you can do to make yourself more comfortable. Follow-up care is a key part of your treatment and safety.  Be sure to make and go to all appointments, and call your doctor if you are having problems. It's also a good idea to know your test results and keep a list of the medicines you take. How can you care for yourself at home? · Get plenty of rest if you feel tired. · Take an over-the-counter pain medicine if needed, such as acetaminophen (Tylenol), ibuprofen (Advil, Motrin), or naproxen (Aleve). Read and follow all instructions on the label. · Be careful when taking over-the-counter cold or flu medicines and Tylenol at the same time. Many of these medicines have acetaminophen, which is Tylenol. Read the labels to make sure that you are not taking more than the recommended dose. Too much acetaminophen (Tylenol) can be harmful. · Drink plenty of fluids, enough so that your urine is light yellow or clear like water. If you have kidney, heart, or liver disease and have to limit fluids, talk with your doctor before you increase the amount of fluids you drink. · Stay home from work, school, and other public places while you have a fever. When should you call for help? Call 911 anytime you think you may need emergency care. For example, call if:    · You have severe trouble breathing.     · You passed out (lost consciousness).    Call your doctor now or seek immediate medical care if:    · You seem to be getting much sicker.     · You have a new or higher fever.     · You have blood in your stools.     · You have new belly pain, or your pain gets worse.     · You have a new rash.    Watch closely for changes in your health, and be sure to contact your doctor if:    · You start to get better and then get worse.     · You do not get better as expected. Where can you learn more? Go to http://maggie-kelvin.info/. Enter D943 in the search box to learn more about \"Viral Infections: Care Instructions. \"  Current as of: June 9, 2019  Content Version: 12.2  © 0602-5793 [employer].  Care instructions adapted under license by [employer] (which disclaims liability or warranty for this information). If you have questions about a medical condition or this instruction, always ask your healthcare professional. [address] any warranty or liability for your use of this information.

## 2019-10-03 NOTE — LETTER
[address] [place] [address] [facility] 
[address] [facility] [id number] [phone] Work/School Note Date: 10/3/2019 To Whom It May concern: 
 
Darice Burkitt was seen and treated today in the emergency room by the following provider(s): 
Attending Provider: Juli Hanley MD 
Physician Assistant: CELESTINA Edwards. Darice Burkitt may return to work on 10/7/19. Sincerely, CELESTINA Giles

## 2019-10-03 NOTE — ED PROVIDER NOTES
[address] EMERGENCY DEPT    Date: 10/3/2019  Patient Name: Marian Arriola    History of Presenting Illness     Chief Complaint   Patient presents with    Generalized Body Aches    Nasal Congestion    Nausea    Headache       47 y.o. female with noted past medical history who presents to the emergency department complaining of nasal congestion, dry cough, generalized body aches, and nausea vomiting for the past 4 days. Patient states that she vomited once 4 days ago and then twice yesterday. States she feels persistently nauseous, has not vomited yet today. States she was in bed all day yesterday due to feeling bad. She denies any known fever, but states she has felt hot and chilled at times. Denies any sputum production, abdominal pain, diarrhea, sore throat, other symptoms at this time. Patient denies any other associated signs or symptoms. Patient denies any other complaints. Nursing notes regarding the HPI and triage nursing notes were reviewed. Prior medical records were reviewed. Current Outpatient Medications   Medication Sig Dispense Refill    sodium chloride (NASAL MIST) 0.9 % spra 1 Spray by Nasal route as needed (congestion). 126 mL 0    ondansetron (ZOFRAN ODT) 4 mg disintegrating tablet Take 1-2 tablets every 6-8 hours as needed for nausea and vomiting. 10 Tab 0    ibuprofen (MOTRIN) 600 mg tablet Take 1 Tab by mouth every six (6) hours as needed for Pain. 20 Tab 0    benzonatate (TESSALON PERLES) 100 mg capsule Take 1 Cap by mouth three (3) times daily as needed for Cough for up to 7 days. 30 Cap 0    cyclobenzaprine (FLEXERIL) 10 mg tablet Take 1 Tab by mouth three (3) times daily as needed for Muscle Spasm(s).  15 Tab 0       Past History     Past Medical History:  Past Medical History:   Diagnosis Date    Cigarette smoker     Dyspnea on exertion Aug 2011    Negative cardiac and pulmonary workup    Heart murmur     History of gestational diabetes     Musculoskeletal disorder     DDD    Neurological disorder     migraines       Past Surgical History:  Past Surgical History:   Procedure Laterality Date    HX  SECTION      HX TUBAL LIGATION         Family History:  Family History   Problem Relation Age of Onset    Heart Attack Mother 61    Cancer Mother     Hypertension Mother     Coronary Artery Disease Mother     Cancer Other     Heart Disease Other     Stroke Other     Diabetes Other     Hypertension Other     Psychiatric Disorder Other        Social History:  Social History     Tobacco Use    Smoking status: Current Every Day Smoker     Packs/day: 1.00     Years: 10.00     Pack years: 10.00    Smokeless tobacco: Never Used   Substance Use Topics    Alcohol use: No    Drug use: No     Comment: Past history of crack, cocaine use       Allergies:  No Known Allergies    Patient's primary care provider (as noted in EPIC): Mickey Vigil MD    Review of Systems   Constitutional:  + malaise, chills. ENMT:  + nasal congestion. Denies sore throat. Cardiac:  Denies chest pain or palpitations. Respiratory: + dry cough. Denies wheezing, difficulty breathing, shortness of breath. GI/ABD: + nausea, vomiting. Denies injury, pain, distention, diarrhea. Neuro: + mild headache. Denies LOC, dizziness, neurologic symptoms/deficits/paresthesias. Skin: Denies injury, rash, itching or skin changes. All other systems negative as reviewed. Visit Vitals  /61   Pulse 94   Temp 98.6 °F (37 °C)   Resp 18   Ht 5' 4\" (1.626 m)   Wt 68 kg (150 lb)   SpO2 100%   BMI 25.75 kg/m²       PHYSICAL EXAM:    CONSTITUTIONAL:  Alert, appears uncomfortable;  well developed;  well nourished. HEAD:  Normocephalic, atraumatic. EYES:  EOMI. Non-icteric sclera. Normal conjunctiva. ENTM:  Nose:  no rhinorrhea. Throat: Nose: Nasal turbinates erythematous and edematous, no drainage.   Oropharynx: without erythema, edema or exudate, mucous membranes moist.  Uvula midline, airway patent. NECK:  Supple. RESPIRATORY:  Chest clear, equal breath sounds, good air movement. Without wheezes, rhonchi, rales. CARDIOVASCULAR:  Regular rate and rhythm. No murmurs, rubs, or gallops. GI:  Normal bowel sounds, abdomen soft and non-tender. No rebound or guarding. BACK:  Non-tender. NEURO:  Moves all four extremities, and grossly normal motor exam.  SKIN:  No rashes;  Normal for age. PSYCH:  Alert and normal affect. DIFFERENTIAL DIAGNOSES/ MEDICAL DECISION MAKING:  Viral upper respiratory infection, acute bronchitis, influenza/ flu, pneumonia, new onset asthma, other etiologies versus a combination of the above (ex. uri on top of pneumonia). Recent Results (from the past 12 hour(s))   CBC WITH AUTOMATED DIFF    Collection Time: 10/03/19 11:50 AM   Result Value Ref Range    WBC 9.0 4.6 - 13.2 K/uL    RBC 4.89 4.20 - 5.30 M/uL    HGB 10.8 (L) 12.0 - 16.0 g/dL    HCT 35.9 35.0 - 45.0 %    MCV 73.4 (L) 74.0 - 97.0 FL    MCH 22.1 (L) 24.0 - 34.0 PG    MCHC 30.1 (L) 31.0 - 37.0 g/dL    RDW 17.5 (H) 11.6 - 14.5 %    PLATELET 557 (H) 067 - 420 K/uL    MPV 9.1 (L) 9.2 - 11.8 FL    NEUTROPHILS 74 (H) 40 - 73 %    LYMPHOCYTES 19 (L) 21 - 52 %    MONOCYTES 6 3 - 10 %    EOSINOPHILS 1 0 - 5 %    BASOPHILS 0 0 - 2 %    ABS. NEUTROPHILS 6.7 1.8 - 8.0 K/UL    ABS. LYMPHOCYTES 1.7 0.9 - 3.6 K/UL    ABS. MONOCYTES 0.5 0.05 - 1.2 K/UL    ABS. EOSINOPHILS 0.1 0.0 - 0.4 K/UL    ABS.  BASOPHILS 0.0 0.0 - 0.1 K/UL    DF AUTOMATED      PLATELET COMMENTS Increased Platelets      RBC COMMENTS ANISOCYTOSIS  3+        RBC COMMENTS HYPOCHROMIA  1+        RBC COMMENTS MICROCYTOSIS  1+       METABOLIC PANEL, BASIC    Collection Time: 10/03/19 11:50 AM   Result Value Ref Range    Sodium 140 136 - 145 mmol/L    Potassium 3.9 3.5 - 5.5 mmol/L    Chloride 107 100 - 111 mmol/L    CO2 28 21 - 32 mmol/L    Anion gap 5 3.0 - 18 mmol/L    Glucose 102 (H) 74 - 99 mg/dL    BUN 7 7.0 - 18 MG/DL Creatinine 0.78 0.6 - 1.3 MG/DL    BUN/Creatinine ratio 9 (L) 12 - 20      GFR est AA >60 >60 ml/min/1.73m2    GFR est non-AA >60 >60 ml/min/1.73m2    Calcium 9.5 8.5 - 10.1 MG/DL   INFLUENZA A & B AG (RAPID TEST)    Collection Time: 10/03/19 12:50 PM   Result Value Ref Range    Influenza A Antigen NEGATIVE  NEG      Influenza B Antigen NEGATIVE  NEG          Based on the patient's history of presenting illness, physical examination, laboratory, radiographic, and/or tests results, and response to medical interventions, I believe the patient most likely is having the noted constellation of symptoms secondary to a viral illness. Patient to rest, drink plenty of fluids. I have written for Zofran, Tessalon Perles, motrin and nasal mist.  Pt may f/u with her PCP. Patient is also been evaluated by my attending, Dr. Jacquelyn Lynn, who agrees with the assessment and plan. Diagnosis:   1. Viral illness    2. Anemia, unspecified type      Disposition: Discharge    Follow-up Information     Follow up With Specialties Details Why Contact Info    [facility]  In 3 days  [address]    [id number] [place] [address] EMERGENCY DEPT Emergency Medicine  If symptoms worsen [address] [facility]  [phone]          Discharge Medication List as of 10/3/2019  1:28 PM      START taking these medications    Details   sodium chloride (NASAL MIST) 0.9 % spra 1 Spray by Nasal route as needed (congestion). , Print, Disp-126 mL, R-0      ondansetron (ZOFRAN ODT) 4 mg disintegrating tablet Take 1-2 tablets every 6-8 hours as needed for nausea and vomiting., Print, Disp-10 Tab, R-0      ibuprofen (MOTRIN) 600 mg tablet Take 1 Tab by mouth every six (6) hours as needed for Pain., Print, Disp-20 Tab, R-0      benzonatate (TESSALON PERLES) 100 mg capsule Take 1 Cap by mouth three (3) times daily as needed for Cough for up to 7 days. , Print, Disp-30 Cap, R-0         CONTINUE these medications which have NOT CHANGED    Details   cyclobenzaprine (FLEXERIL) 10 mg tablet Take 1 Tab by mouth three (3) times daily as needed for Muscle Spasm(s). , Print, Disp-15 Tab, R-0           CELESTINA Major

## 2020-02-08 ENCOUNTER — HOSPITAL ENCOUNTER (EMERGENCY)
Age: 50
Discharge: HOME | End: 2020-02-08
Attending: EMERGENCY MEDICINE
Payer: SELF-PAY

## 2020-02-08 VITALS
OXYGEN SATURATION: 100 % | RESPIRATION RATE: 20 BRPM | TEMPERATURE: 98.9 F | WEIGHT: 150 LBS | HEART RATE: 81 BPM | BODY MASS INDEX: 25.61 KG/M2 | SYSTOLIC BLOOD PRESSURE: 126 MMHG | HEIGHT: 64 IN | DIASTOLIC BLOOD PRESSURE: 68 MMHG

## 2020-02-08 DIAGNOSIS — M54.42 ACUTE MIDLINE LOW BACK PAIN WITH LEFT-SIDED SCIATICA: Primary | ICD-10-CM

## 2020-02-08 LAB
APPEARANCE UR: CLEAR
BILIRUB UR QL: NEGATIVE
COLOR UR: YELLOW
GLUCOSE UR STRIP.AUTO-MCNC: NEGATIVE MG/DL
HCG UR QL: NEGATIVE
HGB UR QL STRIP: NEGATIVE
KETONES UR QL STRIP.AUTO: NEGATIVE MG/DL
LEUKOCYTE ESTERASE UR QL STRIP.AUTO: NEGATIVE
NITRITE UR QL STRIP.AUTO: NEGATIVE
PH UR STRIP: 6 [PH] (ref 5–8)
PROT UR STRIP-MCNC: NEGATIVE MG/DL
SP GR UR REFRACTOMETRY: 1.02 (ref 1–1.03)
UROBILINOGEN UR QL STRIP.AUTO: 1 EU/DL (ref 0.2–1)

## 2020-02-08 PROCEDURE — 99283 EMERGENCY DEPT VISIT LOW MDM: CPT

## 2020-02-08 PROCEDURE — 74011250636 HC RX REV CODE- 250/636: Performed by: PHYSICIAN ASSISTANT

## 2020-02-08 PROCEDURE — 81003 URINALYSIS AUTO W/O SCOPE: CPT

## 2020-02-08 PROCEDURE — 81025 URINE PREGNANCY TEST: CPT

## 2020-02-08 PROCEDURE — 96372 THER/PROPH/DIAG INJ SC/IM: CPT

## 2020-02-08 PROCEDURE — 74011636637 HC RX REV CODE- 636/637: Performed by: PHYSICIAN ASSISTANT

## 2020-02-08 RX ORDER — IBUPROFEN 600 MG/1
600 TABLET ORAL
Qty: 20 TAB | Refills: 0 | Status: SHIPPED | OUTPATIENT
Start: 2020-02-08 | End: 2021-06-15

## 2020-02-08 RX ORDER — PREDNISONE 10 MG/1
TABLET ORAL
Qty: 21 TAB | Refills: 0 | Status: SHIPPED | OUTPATIENT
Start: 2020-02-08 | End: 2021-06-15

## 2020-02-08 RX ORDER — KETOROLAC TROMETHAMINE 15 MG/ML
15 INJECTION, SOLUTION INTRAMUSCULAR; INTRAVENOUS
Status: COMPLETED | OUTPATIENT
Start: 2020-02-08 | End: 2020-02-08

## 2020-02-08 RX ORDER — KETOROLAC TROMETHAMINE 30 MG/ML
60 INJECTION, SOLUTION INTRAMUSCULAR; INTRAVENOUS
Status: DISCONTINUED | OUTPATIENT
Start: 2020-02-08 | End: 2020-02-08

## 2020-02-08 RX ORDER — KETOROLAC TROMETHAMINE 15 MG/ML
15 INJECTION, SOLUTION INTRAMUSCULAR; INTRAVENOUS ONCE
Status: DISCONTINUED | OUTPATIENT
Start: 2020-02-08 | End: 2020-02-08

## 2020-02-08 RX ORDER — PREDNISONE 20 MG/1
60 TABLET ORAL
Status: COMPLETED | OUTPATIENT
Start: 2020-02-08 | End: 2020-02-08

## 2020-02-08 RX ADMIN — KETOROLAC TROMETHAMINE 15 MG: 15 INJECTION, SOLUTION INTRAMUSCULAR; INTRAVENOUS at 20:05

## 2020-02-08 RX ADMIN — PREDNISONE 60 MG: 20 TABLET ORAL at 20:01

## 2020-02-08 NOTE — ED PROVIDER NOTES
EMERGENCY DEPARTMENT HISTORY AND PHYSICAL EXAM    6:56 PM      Date: 2/8/2020  Patient Name: Joanna Lloyd    History of Presenting Illness     Chief Complaint   Patient presents with    Back Pain       History Provided By: Patient    Chief Complaint: low back pain radiating down the left leg  Duration:  Days  Timing:  Acute  Location:   Quality: Aching  Severity: Moderate  Modifying Factors: none  Associated Symptoms: denies any other associated signs or symptoms      Additional History (Context):Ricarda Raines is a 47 y.o. female with a pertinent history of degenerative disc disease, migraines, sciatica who presents to the emergency department for evaluation of atraumatic mid lower back pain that radiates down the left leg for the past few days. Patient reports history of multiple bulging disks in her lumbar spine as well as a previous diagnosis of sciatica. No recent heavy lifting or trauma. Patient reports no associated urinary symptoms, perianal anesthesia, or incontinence of bowels or bladder. Patient has been taking over-the-counter Motrin without any relief in her symptoms. She is also been alternating ice and heat. Patient reports she was previously treated with anti-inflammatories and steroids and this worked very well. No recent fever, chills, nausea, vomiting, or any other complaints. PCP:  Bri Crowell MD      Current Outpatient Medications   Medication Sig Dispense Refill    ibuprofen (MOTRIN) 600 mg tablet Take 1 Tab by mouth every eight (8) hours as needed for Pain. 20 Tab 0    predniSONE (DELTASONE) 10 mg tablet Take six pills on Day 1, five pills on Day 2, four pills on Day 3, three pills on Day 4, two pills on Day 5, and one pill on Day 6. 21 Tab 0    ibuprofen (MOTRIN) 600 mg tablet Take 1 Tab by mouth every six (6) hours as needed for Pain.  20 Tab 0       Past History     Past Medical History:  Past Medical History:   Diagnosis Date    Cigarette smoker     Dyspnea on exertion Aug 2011    Negative cardiac and pulmonary workup    Heart murmur     History of gestational diabetes     Musculoskeletal disorder     DDD    Neurological disorder     migraines       Past Surgical History:  Past Surgical History:   Procedure Laterality Date    HX  SECTION      HX TUBAL LIGATION         Family History:  Family History   Problem Relation Age of Onset    Heart Attack Mother 61    Cancer Mother     Hypertension Mother     Coronary Artery Disease Mother     Cancer Other     Heart Disease Other     Stroke Other     Diabetes Other     Hypertension Other     Psychiatric Disorder Other        Social History:  Social History     Tobacco Use    Smoking status: Former Smoker     Packs/day: 1.00     Years: 10.00     Pack years: 10.00     Last attempt to quit: 2013     Years since quittin.0    Smokeless tobacco: Current User   Substance Use Topics    Alcohol use: No    Drug use: No     Comment:  quit cocaine 20 years ago       Allergies:  No Known Allergies      Review of Systems       Review of Systems   Constitutional: Negative for chills and fever. HENT: Negative for congestion, rhinorrhea and sore throat. Respiratory: Negative for cough and shortness of breath. Cardiovascular: Negative for chest pain. Gastrointestinal: Negative for abdominal pain, blood in stool, constipation, diarrhea, nausea and vomiting. Genitourinary: Negative for dysuria, frequency and hematuria. Musculoskeletal: Positive for back pain. Negative for myalgias. Skin: Negative for rash and wound. Neurological: Negative for dizziness, weakness, numbness and headaches. All other systems reviewed and are negative. Physical Exam     Visit Vitals  /68 (BP 1 Location: Left arm)   Pulse 81   Temp 98.9 °F (37.2 °C)   Resp 20   Ht 5' 4\" (1.626 m)   Wt 68 kg (150 lb)   LMP 2020   SpO2 100%   BMI 25.75 kg/m²     Physical Exam  Vitals signs and nursing note reviewed. Constitutional:       General: She is not in acute distress. Appearance: She is well-developed. She is not diaphoretic. HENT:      Head: Normocephalic and atraumatic. Eyes:      Conjunctiva/sclera: Conjunctivae normal.   Neck:      Musculoskeletal: Normal range of motion and neck supple. Cardiovascular:      Rate and Rhythm: Normal rate and regular rhythm. Heart sounds: Normal heart sounds. Pulmonary:      Effort: Pulmonary effort is normal. No respiratory distress. Breath sounds: Normal breath sounds. Chest:      Chest wall: No tenderness. Abdominal:      General: Bowel sounds are normal. There is no distension. Palpations: Abdomen is soft. Tenderness: There is no abdominal tenderness. There is no guarding or rebound. Musculoskeletal:         General: No tenderness or deformity. Comments: (+) SLR on left. No TTP noted on exam.  No obvious deformity, step-off deformity, midline spinal tenderness, edema, ecchymosis, erythema, or overlying skin changes noted on exam.  Pt is ambulatory with even, steady gait, moving BLE with 5/5 strength and FROM against resistance in flexion and extension. Pt is neurovascularly intact distally with cap refill < 3 seconds and intact sensation. Skin:     General: Skin is warm and dry. Neurological:      Mental Status: She is alert and oriented to person, place, and time. Deep Tendon Reflexes: Reflexes are normal and symmetric.          Diagnostic Study Results     Labs -  Recent Results (from the past 12 hour(s))   URINALYSIS W/ RFLX MICROSCOPIC    Collection Time: 02/08/20  7:00 PM   Result Value Ref Range    Color YELLOW      Appearance CLEAR      Specific gravity 1.022 1.005 - 1.030      pH (UA) 6.0 5.0 - 8.0      Protein NEGATIVE  NEG mg/dL    Glucose NEGATIVE  NEG mg/dL    Ketone NEGATIVE  NEG mg/dL    Bilirubin NEGATIVE  NEG      Blood NEGATIVE  NEG      Urobilinogen 1.0 0.2 - 1.0 EU/dL    Nitrites NEGATIVE  NEG Leukocyte Esterase NEGATIVE  NEG     HCG URINE, QL    Collection Time: 02/08/20  7:00 PM   Result Value Ref Range    HCG urine, QL NEGATIVE  NEG         Radiologic Studies -   No results found. Medical Decision Making   I am the first provider for this patient. I reviewed the vital signs, available nursing notes, past medical history, past surgical history, family history and social history. Vital Signs-Reviewed the patient's vital signs. Pulse Oximetry Analysis -  100% on room air (Interpretation)    Records Reviewed: Nursing Notes and Old Medical Records (Time of Review: 6:56 PM)    ED Course: Progress Notes, Reevaluation, and Consults:    Provider Notes (Medical Decision Making):   Differential Diagnosis: Musculoskeletal pain, myofascial strain/sprain, muscle spasm, spondylolisthesis, spondylosis, DJD, OA, sciatica    Plan:  Pt presents ambulator, well-hydrated, non-toxic in appearance, with normal vitals. No red flags such as saddle paresthesias, weakness, bladder/bowel dysfunction, IVDA, DM, or fever - very low suspicion for epidural abscess, cauda equina, or spinal cord injury. No recent injury/trauma/heavy lifting. Positive SLR on left. Do not feel that emergent imaging is warranted at this time. Exam and HPI consistent with sciatica. Will trial toradol and prednisone here with outpatient Motrin and prednisone. Pt is advised to apply ice. Explained to patient that this pain may take a few weeks to completely resolve. At this time, patient is stable and appropriate for discharge home. Patient demonstrates understanding of current diagnoses and is in agreement with the treatment plan. They are advised that while the likelihood of serious underlying condition is low at this point given the evaluation performed today, we cannot fully rule it out. They are advised to immediately return with any new symptoms or worsening of current condition. All questions have been answered. Patient is given educational material regarding their diagnoses, including danger symptoms and when to return to the ED. Diagnosis     Clinical Impression:   1. Acute midline low back pain with left-sided sciatica        Disposition: DC Home    Follow-up Information     Follow up With Specialties Details Why Contact Info    Johny Coto MD Family Practice Call in 2 days As needed [address] [place] [id number] [phone] [id number] [facility] EMERGENCY DEPT Emergency Medicine Go to If symptoms worsen [address] [phone]           Patient's Medications   Start Taking    IBUPROFEN (MOTRIN) 600 MG TABLET    Take 1 Tab by mouth every eight (8) hours as needed for Pain. PREDNISONE (DELTASONE) 10 MG TABLET    Take six pills on Day 1, five pills on Day 2, four pills on Day 3, three pills on Day 4, two pills on Day 5, and one pill on Day 6. Continue Taking    IBUPROFEN (MOTRIN) 600 MG TABLET    Take 1 Tab by mouth every six (6) hours as needed for Pain. These Medications have changed    No medications on file   Stop Taking    CYCLOBENZAPRINE (FLEXERIL) 10 MG TABLET    Take 1 Tab by mouth three (3) times daily as needed for Muscle Spasm(s). ONDANSETRON (ZOFRAN ODT) 4 MG DISINTEGRATING TABLET    Take 1-2 tablets every 6-8 hours as needed for nausea and vomiting. SODIUM CHLORIDE (NASAL MIST) 0.9 % SPRA    1 Spray by Nasal route as needed (congestion). _______________________________    This note was dictated utilizing voice recognition software which may lead to typographical errors. I apologize in advance if the situation occurs. If questions arise please do not hesitate to contact me or call our department.   Heather Brown PA-C

## 2020-02-08 NOTE — ED TRIAGE NOTES
Pt states she is having a sciatica flare up. C/o low back pain since noon today.  Took 800 motrin without relief

## 2020-02-09 NOTE — ED NOTES
Verbal shift change report given to Marta Pérez RN (oncoming nurse) by Marge Van RN (offgoing nurse). Report included the following information SBAR.

## 2021-04-29 ENCOUNTER — HOSPITAL ENCOUNTER (OUTPATIENT)
Dept: LAB | Age: 51
Discharge: HOME | End: 2021-04-29
Payer: MEDICAID

## 2021-04-29 LAB
ERYTHROCYTE [SEDIMENTATION RATE] IN BLOOD: 18 MM/HR (ref 0–30)
XX-LABCORP SPECIMEN COL,LCBCF: NORMAL

## 2021-04-29 PROCEDURE — 85652 RBC SED RATE AUTOMATED: CPT

## 2021-04-29 PROCEDURE — 36415 COLL VENOUS BLD VENIPUNCTURE: CPT

## 2021-04-29 PROCEDURE — 99001 SPECIMEN HANDLING PT-LAB: CPT

## 2021-05-19 ENCOUNTER — TRANSCRIBE ORDER (OUTPATIENT)
Dept: SCHEDULING | Age: 51
End: 2021-05-19

## 2021-05-19 DIAGNOSIS — N92.4 PERIMENOPAUSAL MENORRHAGIA: Primary | ICD-10-CM

## 2021-05-27 ENCOUNTER — HOSPITAL ENCOUNTER (OUTPATIENT)
Dept: ULTRASOUND IMAGING | Age: 51
Discharge: HOME | End: 2021-05-27
Attending: NURSE PRACTITIONER
Payer: MEDICAID

## 2021-05-27 DIAGNOSIS — N92.4 PERIMENOPAUSAL MENORRHAGIA: ICD-10-CM

## 2021-05-27 PROCEDURE — 76856 US EXAM PELVIC COMPLETE: CPT

## 2021-06-02 ENCOUNTER — CLINICAL SUPPORT (OUTPATIENT)
Dept: SURGERY | Age: 51
End: 2021-06-02

## 2021-06-02 VITALS
TEMPERATURE: 98.2 F | BODY MASS INDEX: 28.34 KG/M2 | RESPIRATION RATE: 18 BRPM | WEIGHT: 154 LBS | HEIGHT: 62 IN | OXYGEN SATURATION: 99 % | HEART RATE: 88 BPM

## 2021-06-02 DIAGNOSIS — Z12.11 COLON CANCER SCREENING: ICD-10-CM

## 2021-06-02 DIAGNOSIS — Z01.818 PRE-OP TESTING: Primary | ICD-10-CM

## 2021-06-02 RX ORDER — TRAZODONE HYDROCHLORIDE 100 MG/1
100 TABLET ORAL
COMMUNITY
End: 2021-06-15

## 2021-06-02 RX ORDER — LANOLIN ALCOHOL/MO/W.PET/CERES
500 CREAM (GRAM) TOPICAL DAILY
COMMUNITY

## 2021-06-02 RX ORDER — SERTRALINE HYDROCHLORIDE 100 MG/1
100 TABLET, FILM COATED ORAL
COMMUNITY

## 2021-06-02 RX ORDER — LANOLIN ALCOHOL/MO/W.PET/CERES
CREAM (GRAM) TOPICAL 2 TIMES DAILY
COMMUNITY

## 2021-06-02 NOTE — PROGRESS NOTES
Review of Systems   Constitutional: Positive for malaise/fatigue. Negative for chills, diaphoresis, fever and weight loss. HENT: Negative. Eyes: Negative. Respiratory: Positive for shortness of breath. Negative for cough, hemoptysis, sputum production and wheezing. Cardiovascular: Negative. Gastrointestinal: Negative. Genitourinary: Negative. Musculoskeletal: Positive for back pain and joint pain. Negative for falls, myalgias and neck pain. Spasms in legs   Skin: Negative. Neurological: Negative. Endo/Heme/Allergies: Negative. Psychiatric/Behavioral: Positive for memory loss. Negative for depression, hallucinations, substance abuse and suicidal ideas. The patient is nervous/anxious and has insomnia. Colon Screen    Patient: Beatriz Eli MRN: [id number]  SSN: [id number]    YOB: 1970  Age: 45 y.o. Sex: female        Subjective:   Beatriz Eli was referred by her PCP, Buddy Quinonez MD.  Patient referred for colonoscopy for   Screening colonoscopy. Patient denies rectal pain or bleeding. Abdominal surgeries as described below, specifically  section and tubal ligation. Family history as described below, specifically none. Patient has never had a colonoscopy.     No Known Allergies    Past Medical History:   Diagnosis Date    Cigarette smoker     Dyspnea on exertion Aug 2011    Negative cardiac and pulmonary workup    Heart murmur     Heart murmur     History of gestational diabetes     Musculoskeletal disorder     DDD    Neurological disorder     migraines    Ovarian cyst      Past Surgical History:   Procedure Laterality Date    HX  SECTION      HX TUBAL LIGATION        Family History   Problem Relation Age of Onset    Heart Attack Mother 61    Cancer Mother     Hypertension Mother     Coronary Artery Disease Mother     Cancer Other     Heart Disease Other     Stroke Other     Diabetes Other     Hypertension Other     Psychiatric Disorder Other      Social History     Tobacco Use    Smoking status: Former Smoker     Packs/day: 1.00     Years: 10.00     Pack years: 10.00     Quit date: 2013     Years since quittin.3    Smokeless tobacco: Current User    Tobacco comment: vaping   Substance Use Topics    Alcohol use: No      Prior to Admission medications    Medication Sig Start Date End Date Taking? Authorizing Provider   ferrous sulfate (Iron) 325 mg (65 mg iron) tablet Take  by mouth two (2) times a day. Yes Provider, Historical   sertraline (Zoloft) 100 mg tablet Take  by mouth daily. Yes Provider, Historical   traZODone (DESYREL) 100 mg tablet Take 100 mg by mouth nightly. Yes Provider, Historical   cyanocobalamin (VITAMIN B12) 500 mcg tablet Take 500 mcg by mouth daily. Yes Provider, Historical   methocarbamol (ROBAXIN-750) 750 mg tablet Take 1 Tab by mouth four (4) times daily. 20  Yes Gricelda Slaughter PA-C   ibuprofen (MOTRIN) 600 mg tablet Take 1 Tab by mouth every eight (8) hours as needed for Pain. Patient not taking: Reported on 2021   Awa Garcia PA-C   predniSONE (DELTASONE) 10 mg tablet Take six pills on Day 1, five pills on Day 2, four pills on Day 3, three pills on Day 4, two pills on Day 5, and one pill on Day 6. Patient not taking: Reported on 2021   Awa Garcia PA-C   ibuprofen (MOTRIN) 600 mg tablet Take 1 Tab by mouth every six (6) hours as needed for Pain. Patient not taking: Reported on 2021 10/3/19   CELESTINA Domingo              Risks colonoscopy described- colon injury, missed lesion, anesthesia problems, bleeding       Vane Cleveland, LPN  [id number]  6:23 PM

## 2021-06-15 RX ORDER — HYDROXYZINE PAMOATE 25 MG/1
25 CAPSULE ORAL
COMMUNITY

## 2021-06-15 NOTE — PERIOP NOTES
[facility] PAT phone assessment completed on 6/15/2021. The following instructions were reviewed with Tania Marcos. Tania Marcos verbalized understanding. 1. Do NOT eat or drink anything, including candy, gum, or ice chips after midnight on 6/30/2021, unless you have specific instructions from your surgeon or anesthesia provider to do so.  2. You may brush your teeth before coming to the hospital.  3. No smoking 24 hours prior to the day of surgery. 4. No alcohol 24 hours prior to the day of surgery. 5. No recreational drugs for one week prior to the day of surgery. 6. Leave all valuables, including money/purse, at home. 7. Remove all jewelry, nail polish, acrylic nails, and makeup (including mascara); no lotions powders, deodorant, or perfume/cologne/after shave on the skin. 8. Glasses/contact lenses and dentures may be worn to the hospital.  They will be removed prior to surgery. 9. Call your doctor if symptoms of a cold or illness develop within 24-48 hours prior to your surgery. 10.  AN ADULT MUST DRIVE YOU HOME AFTER OUTPATIENT SURGERY. 11.  If you are having an outpatient procedure, please make arrangements for a responsible adult to be with you for 24 hours after your surgery. 12.  NO VISITORS in the hospital at this time for outpatient procedures. Exceptions may be made for surgical admissions, per hospital guidelines        Special Instructions:      Bring list of CURRENT medications. Bring any pertinent legal medical records. Take these medications the morning of surgery with a sip of water:  As directed by MD  Follow physician instructions about stopping anticoagulants. Complete bowel prep per MD instructions.

## 2021-06-25 ENCOUNTER — HOSPITAL ENCOUNTER (OUTPATIENT)
Dept: LAB | Age: 51
Discharge: HOME | End: 2021-06-25
Payer: MEDICAID

## 2021-06-25 PROCEDURE — U0005 INFEC AGEN DETEC AMPLI PROBE: HCPCS

## 2021-06-26 LAB — SARS-COV-2, COV2NT: NOT DETECTED

## 2021-06-29 ENCOUNTER — ANESTHESIA EVENT (OUTPATIENT)
Dept: ENDOSCOPY | Age: 51
End: 2021-06-29
Payer: MEDICAID

## 2021-06-30 ENCOUNTER — HOSPITAL ENCOUNTER (OUTPATIENT)
Age: 51
Setting detail: OUTPATIENT SURGERY
Discharge: HOME | End: 2021-06-30
Attending: COLON & RECTAL SURGERY | Admitting: COLON & RECTAL SURGERY
Payer: MEDICAID

## 2021-06-30 ENCOUNTER — ANESTHESIA (OUTPATIENT)
Dept: ENDOSCOPY | Age: 51
End: 2021-06-30
Payer: MEDICAID

## 2021-06-30 VITALS
HEART RATE: 63 BPM | DIASTOLIC BLOOD PRESSURE: 51 MMHG | SYSTOLIC BLOOD PRESSURE: 106 MMHG | BODY MASS INDEX: 24.99 KG/M2 | HEIGHT: 65 IN | WEIGHT: 150 LBS | TEMPERATURE: 98 F | OXYGEN SATURATION: 100 % | RESPIRATION RATE: 20 BRPM

## 2021-06-30 DIAGNOSIS — Z12.11 SPECIAL SCREENING FOR MALIGNANT NEOPLASMS, COLON: ICD-10-CM

## 2021-06-30 LAB — HCG UR QL: NEGATIVE

## 2021-06-30 PROCEDURE — 74011250636 HC RX REV CODE- 250/636: Performed by: NURSE ANESTHETIST, CERTIFIED REGISTERED

## 2021-06-30 PROCEDURE — 76060000031 HC ANESTHESIA FIRST 0.5 HR: Performed by: COLON & RECTAL SURGERY

## 2021-06-30 PROCEDURE — 00812 ANES LWR INTST SCR COLSC: CPT | Performed by: ANESTHESIOLOGY

## 2021-06-30 PROCEDURE — 74011000250 HC RX REV CODE- 250: Performed by: ANESTHESIOLOGY

## 2021-06-30 PROCEDURE — 76040000019: Performed by: COLON & RECTAL SURGERY

## 2021-06-30 PROCEDURE — 2709999900 HC NON-CHARGEABLE SUPPLY: Performed by: COLON & RECTAL SURGERY

## 2021-06-30 PROCEDURE — 74011250636 HC RX REV CODE- 250/636: Performed by: ANESTHESIOLOGY

## 2021-06-30 PROCEDURE — 81025 URINE PREGNANCY TEST: CPT

## 2021-06-30 PROCEDURE — 74011250637 HC RX REV CODE- 250/637: Performed by: NURSE ANESTHETIST, CERTIFIED REGISTERED

## 2021-06-30 PROCEDURE — 45378 DIAGNOSTIC COLONOSCOPY: CPT | Performed by: COLON & RECTAL SURGERY

## 2021-06-30 RX ORDER — SODIUM CHLORIDE, SODIUM LACTATE, POTASSIUM CHLORIDE, CALCIUM CHLORIDE 600; 310; 30; 20 MG/100ML; MG/100ML; MG/100ML; MG/100ML
25 INJECTION, SOLUTION INTRAVENOUS CONTINUOUS
Status: DISCONTINUED | OUTPATIENT
Start: 2021-06-30 | End: 2021-06-30 | Stop reason: HOSPADM

## 2021-06-30 RX ORDER — SODIUM CHLORIDE 0.9 % (FLUSH) 0.9 %
5-40 SYRINGE (ML) INJECTION AS NEEDED
Status: DISCONTINUED | OUTPATIENT
Start: 2021-06-30 | End: 2021-06-30 | Stop reason: HOSPADM

## 2021-06-30 RX ORDER — LIDOCAINE HYDROCHLORIDE 20 MG/ML
INJECTION, SOLUTION EPIDURAL; INFILTRATION; INTRACAUDAL; PERINEURAL AS NEEDED
Status: DISCONTINUED | OUTPATIENT
Start: 2021-06-30 | End: 2021-06-30 | Stop reason: HOSPADM

## 2021-06-30 RX ORDER — PROPOFOL 10 MG/ML
INJECTION, EMULSION INTRAVENOUS AS NEEDED
Status: DISCONTINUED | OUTPATIENT
Start: 2021-06-30 | End: 2021-06-30 | Stop reason: HOSPADM

## 2021-06-30 RX ORDER — SODIUM CHLORIDE 0.9 % (FLUSH) 0.9 %
5-40 SYRINGE (ML) INJECTION EVERY 8 HOURS
Status: DISCONTINUED | OUTPATIENT
Start: 2021-06-30 | End: 2021-06-30 | Stop reason: HOSPADM

## 2021-06-30 RX ORDER — FAMOTIDINE 20 MG/1
20 TABLET, FILM COATED ORAL ONCE
Status: COMPLETED | OUTPATIENT
Start: 2021-06-30 | End: 2021-06-30

## 2021-06-30 RX ORDER — LIDOCAINE HYDROCHLORIDE 10 MG/ML
0.1 INJECTION, SOLUTION EPIDURAL; INFILTRATION; INTRACAUDAL; PERINEURAL AS NEEDED
Status: DISCONTINUED | OUTPATIENT
Start: 2021-06-30 | End: 2021-06-30 | Stop reason: HOSPADM

## 2021-06-30 RX ADMIN — SODIUM CHLORIDE, SODIUM LACTATE, POTASSIUM CHLORIDE, AND CALCIUM CHLORIDE: 600; 310; 30; 20 INJECTION, SOLUTION INTRAVENOUS at 08:06

## 2021-06-30 RX ADMIN — LIDOCAINE HYDROCHLORIDE 40 MG: 20 INJECTION, SOLUTION EPIDURAL; INFILTRATION; INTRACAUDAL; PERINEURAL at 08:08

## 2021-06-30 RX ADMIN — FAMOTIDINE 20 MG: 20 TABLET ORAL at 07:45

## 2021-06-30 RX ADMIN — PROPOFOL 50 MG: 10 INJECTION, EMULSION INTRAVENOUS at 08:19

## 2021-06-30 RX ADMIN — PROPOFOL 100 MG: 10 INJECTION, EMULSION INTRAVENOUS at 08:08

## 2021-06-30 RX ADMIN — SODIUM CHLORIDE, SODIUM LACTATE, POTASSIUM CHLORIDE, AND CALCIUM CHLORIDE 25 ML/HR: 600; 310; 30; 20 INJECTION, SOLUTION INTRAVENOUS at 07:55

## 2021-06-30 NOTE — DISCHARGE INSTRUCTIONS
FOLLOW UP VISIT Appointment in: Other (Specify) Repeat colonoscopy in 10 year(s). Colonoscopy: What to Expect at [address] [place] [address]  After you have a colonoscopy, you will stay at the clinic for 1 to 2 hours until the medicines wear off. Then you can go home. But you will need to arrange for a ride. Your doctor will tell you when you can eat and do your other usual activities. Your doctor will talk to you about when you will need your next colonoscopy. Your doctor can help you decide how often you need to be checked. This will depend on the results of your test and your risk for colorectal cancer. After the test, you may be bloated or have gas pains. You may need to pass gas. If a biopsy was done or a polyp was removed, you may have streaks of blood in your stool (feces) for a few days. This care sheet gives you a general idea about how long it will take for you to recover. But each person recovers at a different pace. Follow the steps below to get better as quickly as possible. How can you care for yourself at home? Activity  · Rest when you feel tired. · You can do your normal activities when it feels okay to do so. Diet  · Follow your doctor's directions for eating. · Unless your doctor has told you not to, drink plenty of fluids. This helps to replace the fluids that were lost during the colon prep. · Do not drink alcohol. Medicines  · Your doctor will tell you if and when you can restart your medicines. He or she will also give you instructions about taking any new medicines. · If you take blood thinners, such as warfarin (Coumadin), clopidogrel (Plavix), or aspirin, be sure to talk to your doctor. He or she will tell you if and when to start taking those medicines again. Make sure that you understand exactly what your doctor wants you to do.   · If polyps were removed or a biopsy was done during the test, your doctor may tell you not to take aspirin or other anti-inflammatory medicines for a few days. These include ibuprofen (Advil, Motrin) and naproxen (Aleve). Other instructions  · For your safety, do not drive or operate machinery until the medicine wears off and you can think clearly. Your doctor may tell you not to drive or operate machinery until the day after your test.  · Do not sign legal documents or make major decisions until the medicine wears off and you can think clearly. The anesthesia can make it hard for you to fully understand what you are agreeing to. Follow-up care is a key part of your treatment and safety. Be sure to make and go to all appointments, and call your doctor if you are having problems. It's also a good idea to know your test results and keep a list of the medicines you take. When should you call for help? Call 911 anytime you think you may need emergency care. For example, call if:  · You passed out (lost consciousness). · You pass maroon or bloody stools. · You have severe belly pain. Call your doctor now or seek immediate medical care if:  · Your stools are black and tarlike. · Your stools have streaks of blood, but you did not have a biopsy or any polyps removed. · You have belly pain, or your belly is swollen and firm. · You vomit. · You have a fever. · You are very dizzy. Watch closely for changes in your health, and be sure to contact your doctor if you have any problems. Where can you learn more? Go to [employer]tv[employer]be  Enter E264 in the search box to learn more about \"Colonoscopy: What to Expect at Home. \"   © 4346-9336 Healthwise, Incorporated. Care instructions adapted under license by [facility] (which disclaims liability or warranty for this information).  This care instruction is for use with your licensed healthcare professional. If you have questions about a medical condition or this instruction, always ask your healthcare professional. [place] [address] any warranty or liability for your use of this information. Content Version: 36.7.372943; Current as of: November 20, 2015                  DISCHARGE SUMMARY from Nurse     POST-PROCEDURE INSTRUCTIONS:    Call your Physician if you:  ? Observe any excess bleeding. ? Develop a temperature over 100.5o F.  ? Experience abdominal, shoulder or chest pain. ? Notice any signs of decreased circulation or nerve impairment to an extremity such as a change in color, persistent numbness, tingling, coldness or increase in pain. ? Vomit blood or you have nausea and vomiting lasting longer than 4 hours. ? Are unable to take medications. ? Are unable to urinate within 8 hours after discharge following general anesthesia or intravenous sedation. For the next 24 hours after receiving general anesthesia or intravenous sedation, or while taking prescription Narcotics, limit your activities:  ? Do NOT drive a motor vehicle, operate hazard machinery or power tools, or perform tasks that require coordination. The medication you received during your procedure may have some effect on your mental awareness. ? Do NOT make important personal or business decisions. The medication you received during your procedure may have some effect on your mental awareness. ? Do NOT drink alcoholic beverages. These drinks do not mix well with the medications that have been given to you. ? Upon discharge from the hospital, you must be accompanied by a responsible adult. ? Resume your diet as directed by your physician. ? Resume medications as your physician has prescribed. ? Please give a list of your current medications to your Primary Care Provider. ? Please update this list whenever your medications are discontinued, doses are changed, or new medications (including over-the-counter products) are added. ? Please carry medication information at all times in case of emergency situations.         These are general instructions for a healthy lifestyle:    No smoking/ No tobacco products/ Avoid exposure to second hand smoke.  Surgeon General's Warning:  Quitting smoking now greatly reduces serious risk to your health. Obesity, smoking, and a sedentary lifestyle greatly increase your risk for illness.  A healthy diet, regular physical exercise & weight monitoring are important for maintaining a healthy lifestyle   You may be retaining fluid if you have a history of heart failure or if you experience any of the following symptoms:  Weight gain of 3 pounds or more overnight or 5 pounds in a week, increased swelling in our hands or feet or shortness of breath while lying flat in bed. Please call your doctor as soon as you notice any of these symptoms; do not wait until your next office visit. Recognize signs and symptoms of STROKE:  F  -  Face looks uneven  A  -  Arms unable to move or move unevenly  S  -  Speech slurred or non-existent  T  -  Time to call 911 - as soon as signs and symptoms begin - DO NOT go back to bed or wait to see If you get better - TIME IS BRAIN. Colorectal Screening   Colorectal cancer almost always develops from precancerous polyps (abnormal growths) in the colon or rectum. Screening tests can find precancerous polyps, so that they can be removed before they turn into cancer. Screening tests can also find colorectal cancer early, when treatment works best.  Hope Mccord Speak with your physician about when you should begin screening and how often you should be tested. Educational references and/or instructions provided during this visit included:    {[facility] GI DOC TITLES:58265}      APPOINTMENTS:    {[facility] GI APPTS:98117}    Discharge information has been reviewed with the {PATIENT PARENT GUARDIAN:24763}. The {PATIENT PARENT GUARDIAN:58927} verbalized understanding.

## 2021-06-30 NOTE — H&P
HPI: Oscar Valdes is a 45 y.o. female presenting with chief complain of need crc screning. Past Medical History:   Diagnosis Date    Anxiety     Cigarette smoker     Dyspnea on exertion 2011    Negative cardiac and pulmonary workup    Heart murmur     Heart murmur     History of gestational diabetes     Musculoskeletal disorder     DDD    Neurological disorder     migraines    Ovarian cyst        Past Surgical History:   Procedure Laterality Date    HX  SECTION      HX TUBAL LIGATION         Family History   Problem Relation Age of Onset    Heart Attack Mother 61    Cancer Mother     Hypertension Mother     Coronary Artery Disease Mother     Cancer Other     Heart Disease Other     Stroke Other     Diabetes Other     Hypertension Other     Psychiatric Disorder Other        Social History     Socioeconomic History    Marital status:      Spouse name: Not on file    Number of children: Not on file    Years of education: Not on file    Highest education level: Not on file   Tobacco Use    Smoking status: Former Smoker     Packs/day: 1.00     Years: 10.00     Pack years: 10.00     Quit date: 2013     Years since quittin.3    Smokeless tobacco: Current User    Tobacco comment: vaping   Substance and Sexual Activity    Alcohol use: No    Drug use: No     Comment:  quit cocaine 20 years ago    Sexual activity: Yes     Partners: Male     Birth control/protection: None     Social Determinants of Health     Financial Resource Strain:     Difficulty of Paying Living Expenses:    Food Insecurity:     Worried About Running Out of Food in the Last Year:     Ran Out of Food in the Last Year:    Transportation Needs:     Lack of Transportation (Medical):      Lack of Transportation (Non-Medical):    Physical Activity:     Days of Exercise per Week:     Minutes of Exercise per Session:    Stress:     Feeling of Stress :    Social Connections:     Frequency of Communication with Friends and Family:     Frequency of Social Gatherings with Friends and Family:     Attends Evangelical Services:     Active Member of Clubs or Organizations:     Attends Club or Organization Meetings:     Marital Status:        Review of Systems - neg    Outpatient Medications Marked as Taking for the 6/30/21 encounter [facility] HOSPITAL Encounter)   Medication Sig Dispense Refill    ferrous sulfate (Iron) 325 mg (65 mg iron) tablet Take  by mouth two (2) times a day.  sertraline (Zoloft) 100 mg tablet Take 100 mg by mouth nightly.  cyanocobalamin (VITAMIN B12) 500 mcg tablet Take 500 mcg by mouth daily. No Known Allergies    Vitals:    06/15/21 1139 06/30/21 0743   BP:  108/65   Pulse:  83   Resp:  20   Temp:  98.3 °F (36.8 °C)   SpO2:  100%   Weight: 68 kg (150 lb)    Height: 5' 5\" (1.651 m)    LMP: 06/07/2021       Physical Exam  Constitutional:       Appearance: She is well-developed. HENT:      Head: Normocephalic and atraumatic. Eyes:      Conjunctiva/sclera: Conjunctivae normal.   Abdominal:      General: There is no distension. Palpations: Abdomen is soft. Tenderness: There is no abdominal tenderness. Musculoskeletal:         General: Normal range of motion. Lymphadenopathy:      Cervical: No cervical adenopathy. Skin:     General: Skin is warm and dry. Findings: No rash. Neurological:      Sensory: No sensory deficit. Psychiatric:         Speech: Speech normal.         Assessment / Plan    colonoscopy    The diagnoses and plan were discussed with the patient. All questions answered. Plan of care agreed to by all concerned.

## 2021-06-30 NOTE — ANESTHESIA PREPROCEDURE EVALUATION
Relevant Problems   CARDIOVASCULAR   (+) Murmur       Anesthetic History   No history of anesthetic complications            Review of Systems / Medical History  Patient summary reviewed and pertinent labs reviewed    Pulmonary          Smoker         Neuro/Psych   Within defined limits           Cardiovascular                  Exercise tolerance: >4 METS     GI/Hepatic/Renal  Within defined limits              Endo/Other  Within defined limits           Other Findings              Physical Exam    Airway  Mallampati: II  TM Distance: 4 - 6 cm  Neck ROM: normal range of motion   Mouth opening: Normal     Cardiovascular  Regular rate and rhythm,  S1 and S2 normal,  no murmur, click, rub, or gallop             Dental  No notable dental hx       Pulmonary  Breath sounds clear to auscultation               Abdominal  GI exam deferred       Other Findings   Comments: Right eyebrow ring in place. Unable to take out. Discussed with patient risks of leaving it in place. Understands and accepts         Anesthetic Plan    ASA: 2  Anesthesia type: MAC          Induction: Intravenous  Anesthetic plan and risks discussed with: Patient

## 2021-06-30 NOTE — ANESTHESIA POSTPROCEDURE EVALUATION
Procedure(s):  COLONOSCOPY.     MAC    Anesthesia Post Evaluation      Multimodal analgesia: multimodal analgesia used between 6 hours prior to anesthesia start to PACU discharge  Patient location during evaluation: bedside  Patient participation: complete - patient participated  Level of consciousness: awake  Pain score: 0  Pain management: adequate  Airway patency: patent  Anesthetic complications: no  Cardiovascular status: stable  Respiratory status: acceptable  Hydration status: acceptable  Post anesthesia nausea and vomiting:  controlled  Final Post Anesthesia Temperature Assessment:  Normothermia (36.0-37.5 degrees C)      INITIAL Post-op Vital signs:   Vitals Value Taken Time   BP 99/52 06/30/21 0840   Temp 36.7 °C (98 °F) 06/30/21 0835   Pulse 69 06/30/21 0840   Resp 16 06/30/21 0840   SpO2 100 % 06/30/21 0840

## 2021-06-30 NOTE — PROCEDURES
[facility]  [address], [address] [facility],[address]   [place], [address]  [phone]                    Colonoscopy Procedure Note      Aurbee Albert  1970  [id number]                Date of Procedure: 6/30/2021    Preoperative diagnosis: Colon Cancer Screening:  Z12.11    Postoperative diagnosis: Normal    :  Rikki Coombs MD    Assistant(s): Endoscopy Technician-1: Ana Kruse  Endoscopy RN-1: Hawa Adair RN    Sedation: MAC    Complications: None    Implants: None    Procedure Details:  Prior to the procedure, a history and physical were performed. The patients medications, allergies and sensitivities were reviewed and all questions were answered. After informed consent was obtained for the procedure, with all risks and benefits of procedure explained. The patient was taken to the endoscopy suite and placed in the left lateral decubitus position. Patient identification and proposed procedure were verified prior to the procedure by the nurse and I. After sequential anesthesia administered by anesthesiologist, a digital rectal exam was performed and was normal.  The Olympus video colonoscope was introduced through the anus and advanced to cecum, which was identified by the ileocecal valve and appendiceal orifice. The quality of preparation was excellent. The colonoscope was slowly withdrawn and the mucosa examined for any abnormalities. Cecal withdrawal time was greater than 6 minutes. The patient tolerated the procedure well. There were no complications. Findings/Interventions:   Polyps -none    EBL: none    Recommendations: -For colon cancer screening in this average-risk patient, colonoscopy may be repeated in 10 years. Resume normal medication(s).      Discharge Disposition:  Kamilla Carroll MD  6/30/2021  8:31 AM

## 2021-07-22 ENCOUNTER — HOSPITAL ENCOUNTER (EMERGENCY)
Age: 51
Discharge: HOME | End: 2021-07-22
Attending: EMERGENCY MEDICINE
Payer: MEDICAID

## 2021-07-22 VITALS
SYSTOLIC BLOOD PRESSURE: 121 MMHG | HEART RATE: 78 BPM | WEIGHT: 150 LBS | DIASTOLIC BLOOD PRESSURE: 66 MMHG | TEMPERATURE: 98.5 F | OXYGEN SATURATION: 97 % | RESPIRATION RATE: 18 BRPM | HEIGHT: 64 IN | BODY MASS INDEX: 25.61 KG/M2

## 2021-07-22 DIAGNOSIS — M62.830 BACK SPASM: Primary | ICD-10-CM

## 2021-07-22 LAB
APPEARANCE UR: CLEAR
BILIRUB UR QL: NEGATIVE
COLOR UR: YELLOW
GLUCOSE UR STRIP.AUTO-MCNC: NEGATIVE MG/DL
HGB UR QL STRIP: NEGATIVE
KETONES UR QL STRIP.AUTO: ABNORMAL MG/DL
LEUKOCYTE ESTERASE UR QL STRIP.AUTO: NEGATIVE
NITRITE UR QL STRIP.AUTO: NEGATIVE
PH UR STRIP: 5.5 [PH] (ref 5–8)
PROT UR STRIP-MCNC: NEGATIVE MG/DL
SP GR UR REFRACTOMETRY: 1.03 (ref 1–1.03)
UROBILINOGEN UR QL STRIP.AUTO: 1 EU/DL (ref 0.2–1)

## 2021-07-22 PROCEDURE — 96372 THER/PROPH/DIAG INJ SC/IM: CPT

## 2021-07-22 PROCEDURE — 74011250637 HC RX REV CODE- 250/637: Performed by: EMERGENCY MEDICINE

## 2021-07-22 PROCEDURE — 74011250636 HC RX REV CODE- 250/636: Performed by: EMERGENCY MEDICINE

## 2021-07-22 PROCEDURE — 81003 URINALYSIS AUTO W/O SCOPE: CPT

## 2021-07-22 PROCEDURE — 99283 EMERGENCY DEPT VISIT LOW MDM: CPT

## 2021-07-22 RX ORDER — DIAZEPAM 5 MG/1
5 TABLET ORAL
Qty: 12 TABLET | Refills: 0 | Status: SHIPPED | OUTPATIENT
Start: 2021-07-22 | End: 2022-01-03

## 2021-07-22 RX ORDER — OXYCODONE AND ACETAMINOPHEN 5; 325 MG/1; MG/1
1 TABLET ORAL
Status: COMPLETED | OUTPATIENT
Start: 2021-07-22 | End: 2021-07-22

## 2021-07-22 RX ORDER — IBUPROFEN 600 MG/1
600 TABLET ORAL
Qty: 20 TABLET | Refills: 0 | Status: SHIPPED | OUTPATIENT
Start: 2021-07-22 | End: 2022-01-03

## 2021-07-22 RX ORDER — LIDOCAINE 4 G/100G
PATCH TOPICAL
Qty: 30 PATCH | Refills: 0 | Status: SHIPPED | OUTPATIENT
Start: 2021-07-22 | End: 2022-01-03

## 2021-07-22 RX ORDER — TRAZODONE HYDROCHLORIDE 100 MG/1
100 TABLET ORAL
COMMUNITY
End: 2022-01-03

## 2021-07-22 RX ORDER — KETOROLAC TROMETHAMINE 15 MG/ML
15 INJECTION, SOLUTION INTRAMUSCULAR; INTRAVENOUS
Status: COMPLETED | OUTPATIENT
Start: 2021-07-22 | End: 2021-07-22

## 2021-07-22 RX ADMIN — KETOROLAC TROMETHAMINE 15 MG: 15 INJECTION, SOLUTION INTRAMUSCULAR; INTRAVENOUS at 18:37

## 2021-07-22 RX ADMIN — OXYCODONE HYDROCHLORIDE AND ACETAMINOPHEN 1 TABLET: 5; 325 TABLET ORAL at 18:37

## 2021-07-22 NOTE — ED PROVIDER NOTES
[facility]  EMERGENCY DEPARTMENT HISTORY AND PHYSICAL EXAM       Date: 2021   Patient Name: Oscar Valdes   YOB: 1970  Medical Record Number: [id number]    HISTORY OF PRESENTING ILLNESS:     Oscar Valdes is a 45 y.o. female presenting with the noted PMH to the ED c/o low back pain has been intermittent for several weeks. She is seen spine surgery and is scheduled for an MRI. Reports ongoing pain and came here to make sure there was anything else. She reports low back pain with radiation around to her right leg and pelvic area. Worse with movement and sitting and twisting. No urinary complaints or numbness or weakness of the legs or fevers or chills or abdominal pain. Rest of complete systems reviewed and negative    Primary Care Provider: Lauri Davalos MD   Specialist:    Past Medical History:   Past Medical History:   Diagnosis Date    Anxiety     Cigarette smoker     Dyspnea on exertion 2011    Negative cardiac and pulmonary workup    Heart murmur     Heart murmur     History of gestational diabetes     Musculoskeletal disorder     DDD    Neurological disorder     migraines    Ovarian cyst         Past Surgical History:   Past Surgical History:   Procedure Laterality Date    COLONOSCOPY N/A 2021    COLONOS[facility] performed by [facility] ENDOSCOPY    HX  SECTION      HX TUBAL LIGATION          Social History:   Social History     Tobacco Use    Smoking status: Former Smoker     Packs/day: 1.00     Years: 10.00     Pack years: 10.00     Quit date: 2013     Years since quittin.4    Smokeless tobacco: Current User    Tobacco comment: vaping   Substance Use Topics    Alcohol use: No    Drug use: No     Comment:  quit cocaine 20 years ago        Allergies:   No Known Allergies     REVIEW OF SYSTEMS:  Review of Systems   Constitutional: Negative for fever.    Gastrointestinal: Negative for abdominal pain.   Musculoskeletal: Positive for back pain and myalgias. All other systems reviewed and are negative. PHYSICAL EXAM:  Vitals:    07/22/21 1728   BP: 114/63   Pulse: 81   Resp: 18   Temp: 98.5 °F (36.9 °C)   SpO2: 96%   Weight: 68 kg (150 lb)   Height: 5' 4\" (1.626 m)       Physical Exam  Vitals and nursing note reviewed. Constitutional:       General: She is not in acute distress. Appearance: Normal appearance. HENT:      Head: Normocephalic. Mouth/Throat:      Pharynx: Oropharynx is clear. Eyes:      Conjunctiva/sclera: Conjunctivae normal.   Cardiovascular:      Rate and Rhythm: Normal rate and regular rhythm. Pulmonary:      Effort: Pulmonary effort is normal. No respiratory distress. Breath sounds: No wheezing. Abdominal:      General: There is no distension. Palpations: Abdomen is soft. Tenderness: There is no abdominal tenderness. Musculoskeletal:         General: No swelling. Cervical back: Neck supple. Comments: Reproducible back tenderness worse in the right lumbar paraspinal area   Skin:     General: Skin is warm and dry. Neurological:      Mental Status: She is alert and oriented to person, place, and time. Mental status is at baseline. Cranial Nerves: No cranial nerve deficit. Motor: No weakness. Medications   ketorolac (TORADOL) injection 15 mg (15 mg IntraMUSCular Given 7/22/21 1837)   oxyCODONE-acetaminophen (PERCOCET) 5-325 mg per tablet 1 Tablet (1 Tablet Oral Given 7/22/21 1837)       RESULTS:    Labs -   Labs Reviewed   URINALYSIS W/ RFLX MICROSCOPIC - Abnormal; Notable for the following components:       Result Value    Ketone TRACE (*)     All other components within normal limits       Radiologic Studies -  No results found. MEDICAL DECISION MAKING  Acute on chronic low back pain. Seeing orthopedics for this scheduled for MRI. Continue worsening pain despite prednisone. No red flags exam or history. Normal neurologic exam, no urinary or bowel symptoms or fevers or chills or abdominal pain or nausea or vomiting. Urinalysis negative for infection or blood or other acute finding. Will treat symptomatically and have her follow-up with orthopedics, she understands agrees with plan will return to the ED with worsening symptoms      Patient has no new complaints, changes, or physical findings. Results were reviewed with the patient. Pt's questions and concerns were addressed. Care plan was outlined, including follow-up with PCP/specialist and return precautions were discussed. Patient is felt to be stable for discharge at this time. Diagnosis   Clinical Impression:   1. Back spasm           Follow-up Information     Follow up With Specialties Details Why Contact Info    Spine Ortho  Go to   Follow up with them as discussed    [facility] EMERGENCY DEPT Emergency Medicine Go to  As needed, If symptoms worsen [address] [facility]  [phone]          Current Discharge Medication List      START taking these medications    Details   diazePAM (Valium) 5 mg tablet Take 1 Tablet by mouth three (3) times daily as needed for Anxiety (spasm). Max Daily Amount: 15 mg.  Qty: 12 Tablet, Refills: 0  Start date: 7/22/2021    Associated Diagnoses: Back spasm      ibuprofen (MOTRIN) 600 mg tablet Take 1 Tablet by mouth every six (6) hours as needed for Pain. Qty: 20 Tablet, Refills: 0  Start date: 7/22/2021      lidocaine (Salonpas, lidocaine,) 4 % patch Apply up to twice a day to area of pain  Qty: 30 Patch, Refills: 0  Start date: 7/22/2021             Discharged in stable and improved condition. This chart was completed using Dragon, a dictation transcription service. Errors may have resulted from using this device.

## 2021-07-22 NOTE — ED NOTES
I have reviewed discharge instructions with the patient. The patient verbalized understanding. Patient armband removed and given to patient to take home. Patient was informed of the privacy risks if armband lost or stolen  Current Discharge Medication List      START taking these medications    Details   diazePAM (Valium) 5 mg tablet Take 1 Tablet by mouth three (3) times daily as needed for Anxiety (spasm). Max Daily Amount: 15 mg.  Qty: 12 Tablet, Refills: 0  Start date: 7/22/2021    Associated Diagnoses: Back spasm      ibuprofen (MOTRIN) 600 mg tablet Take 1 Tablet by mouth every six (6) hours as needed for Pain.   Qty: 20 Tablet, Refills: 0  Start date: 7/22/2021      lidocaine (Salonpas, lidocaine,) 4 % patch Apply up to twice a day to area of pain  Qty: 30 Patch, Refills: 0  Start date: 7/22/2021

## 2021-07-22 NOTE — ED TRIAGE NOTES
Pt reports back pain starting Tuesday, [facility]. States the pain starts in tailbone and radiates around front to lower abd. Denies injury or trauma, reports hx sciatica.

## 2021-07-22 NOTE — PROGRESS NOTES
30 mg Ketorolac has been therapeutically interchanged to 15 mg per P&T Maximum Ketorolac Dose.   Shilpi Holcomb, PharmD

## 2021-09-01 ENCOUNTER — TRANSCRIBE ORDER (OUTPATIENT)
Dept: SCHEDULING | Age: 51
End: 2021-09-01

## 2021-09-01 DIAGNOSIS — Z12.31 VISIT FOR SCREENING MAMMOGRAM: Primary | ICD-10-CM

## 2021-09-08 ENCOUNTER — HOSPITAL ENCOUNTER (OUTPATIENT)
Dept: MAMMOGRAPHY | Age: 51
Discharge: HOME | End: 2021-09-08
Attending: FAMILY MEDICINE
Payer: MEDICAID

## 2021-09-08 DIAGNOSIS — Z12.31 VISIT FOR SCREENING MAMMOGRAM: ICD-10-CM

## 2021-09-08 PROCEDURE — 77067 SCR MAMMO BI INCL CAD: CPT

## 2021-09-23 ENCOUNTER — OFFICE VISIT (OUTPATIENT)
Dept: ORTHOPEDIC SURGERY | Age: 51
End: 2021-09-23
Payer: MEDICAID

## 2021-09-23 VITALS
TEMPERATURE: 97.5 F | SYSTOLIC BLOOD PRESSURE: 118 MMHG | BODY MASS INDEX: 25.95 KG/M2 | HEIGHT: 64 IN | WEIGHT: 152 LBS | DIASTOLIC BLOOD PRESSURE: 74 MMHG | OXYGEN SATURATION: 100 % | HEART RATE: 78 BPM

## 2021-09-23 DIAGNOSIS — M79.18 CERVICAL MYOFASCIAL PAIN SYNDROME: ICD-10-CM

## 2021-09-23 DIAGNOSIS — G98.8 SENSORY HYPERSENSITIVITY: ICD-10-CM

## 2021-09-23 DIAGNOSIS — M79.18 MYOFASCIAL PAIN: ICD-10-CM

## 2021-09-23 DIAGNOSIS — M54.59 MECHANICAL LOW BACK PAIN: ICD-10-CM

## 2021-09-23 DIAGNOSIS — M79.18 CHRONIC PRIMARY MUSCULOSKELETAL PAIN: Primary | ICD-10-CM

## 2021-09-23 DIAGNOSIS — G89.29 CHRONIC PRIMARY MUSCULOSKELETAL PAIN: Primary | ICD-10-CM

## 2021-09-23 PROCEDURE — 99204 OFFICE O/P NEW MOD 45 MIN: CPT | Performed by: PHYSICAL MEDICINE & REHABILITATION

## 2021-09-23 RX ORDER — TRANEXAMIC ACID 650 1/1
TABLET ORAL
COMMUNITY
Start: 2021-06-21

## 2021-09-23 RX ORDER — HYDROXYZINE HYDROCHLORIDE 10 MG/1
10 TABLET, FILM COATED ORAL
COMMUNITY
Start: 2021-06-28 | End: 2022-01-03

## 2021-09-23 RX ORDER — PREDNISONE 20 MG/1
TABLET ORAL
COMMUNITY
Start: 2021-07-20 | End: 2022-01-03

## 2021-09-23 NOTE — PROGRESS NOTES
[address]  [address], [address]  [place], [address] [place] [address] Street  Phone: [phone]  Fax: [phone]        Yadira Ezra  : 1970  PCP: Maycol Wiseman MD  2021    NEW PATIENT      HISTORY OF PRESENT ILLNESS  Mumtaz Holloway is a 45 y.o. female c/o low back pain radiating into the RLE. She reports numbness and tingling in her hands and feet. She has been seen by Dr. Naun Walker who offered a surgical option. She was seen in the ED 21 with c/o intermittent low back pain x several weeks. Pt notes that she had a work injury in 2018 when a chair broke and she fell backwards onto concrete. She attended PT with no benefit. She notes that Dr. Naun Walker provided L4-5 CAROLINE, cervical ESIs over the time span of a year () with temporary benefit. Lumbar spine MRI dated 21 reviewed. Per report, Mild lateral recess stenosis at L4-5. Elsewhere, no high-grade central canal stenosis at any lumbar level. Degenerative changes. She previously saw CELESTINA Del Valle (and Dr. Genaro Castillo) for pain management, who offered SI joint injections. Pain Score: 8/10. Treatments patient has tried:  Physical therapy:Yes - years ago  Doing HEP: Unknown  Non-opioid medications: Yes  Spinal injections: Cervical CAROLINE, L4-5 CAROLINE with Dr. Naun Walker. Spinal surgery- No.   Last Lumbar MRI: 21    PmHx: Bipolar I, PTSD, anxiety     ASSESSMENT  Mumtaz Holloway is a 45year-old female with chronic diffuse upper to low back pain radiating into the RLE. Her symptoms are likely due to chronic primary musculoskeletal pain exacerbated by a sensory hypersensitivity. PLAN  1. Referral to Dr. Mi Quezada for Fibromyalgia management, pain psychology  2. Referral to Dr. Fawad Canela for pain psychology     Pt will f/u PRN. Diagnoses and all orders for this visit:    1.  Chronic primary musculoskeletal pain  -     REFERRAL TO PHYSICIAL MEDICINE REHAB  -     REFERRAL TO PSYCHOLOGY    2. Myofascial pain  -     REFERRAL TO PHYSICIAL MEDICINE REHAB  -     REFERRAL TO PSYCHOLOGY    3. Mechanical low back pain  -     REFERRAL TO PHYSICIAL MEDICINE REHAB  -     REFERRAL TO PSYCHOLOGY    4. Cervical myofascial pain syndrome  -     REFERRAL TO PHYSICIAL MEDICINE REHAB  -     REFERRAL TO PSYCHOLOGY    5. Sensory hypersensitivity  -     REFERRAL TO PHYSICIAL MEDICINE REHAB  -     REFERRAL TO PSYCHOLOGY         CHIEF COMPLAINT  Kam Figueroa is seen today in consultation at the request of Yg Crespo MD for complaints of diffuse back pain      PAST MEDICAL HISTORY   Past Medical History:   Diagnosis Date    Anxiety     Cigarette smoker     Dyspnea on exertion 2011    Negative cardiac and pulmonary workup    Heart murmur     Heart murmur     History of gestational diabetes     Musculoskeletal disorder     DDD    Neurological disorder     migraines    Ovarian cyst        Past Surgical History:   Procedure Laterality Date    COLONOSCOPY N/A 2021    COLONOSCOPY [facility] by Dayanara Dom[facility]nguez [facility] at [facility] END[facility]COPY    HX  SECTION      HX TUBAL LIGATION         MEDICATIONS    Current Outpatient Medications   Medication Sig Dispense Refill    traZODone (DESYREL) 100 mg tablet Take 100 mg by mouth nightly.  diazePAM (Valium) 5 mg tablet Take 1 Tablet by mouth three (3) times daily as needed for Anxiety (spasm). Max Daily Amount: 15 mg. 12 Tablet 0    ibuprofen (MOTRIN) 600 mg tablet Take 1 Tablet by mouth every six (6) hours as needed for Pain. 20 Tablet 0    lidocaine (Salonpas, lidocaine,) 4 % patch Apply up to twice a day to area of pain 30 Patch 0    hydrOXYzine pamoate (VISTARIL) 25 mg capsule Take 25 mg by mouth nightly. (Patient not taking: Reported on 2021)      ferrous sulfate (Iron) 325 mg (65 mg iron) tablet Take  by mouth two (2) times a day.  sertraline (Zoloft) 100 mg tablet Take 100 mg by mouth nightly.       cyanocobalamin (VITAMIN B12) 500 mcg tablet Take 500 mcg by mouth daily.  methocarbamol (ROBAXIN-750) 750 mg tablet Take 1 Tab by mouth four (4) times daily. (Patient not taking: Reported on 2021) 20 Tab 0       ALLERGIES  No Known Allergies       SOCIAL HISTORY    Social History     Socioeconomic History    Marital status:      Spouse name: Not on file    Number of children: Not on file    Years of education: Not on file    Highest education level: Not on file   Tobacco Use    Smoking status: Former Smoker     Packs/day: 1.00     Years: 10.00     Pack years: 10.00     Quit date: 2013     Years since quittin.6    Smokeless tobacco: Current User    Tobacco comment: vaping   Substance and Sexual Activity    Alcohol use: No    Drug use: No     Comment:  quit cocaine 20 years ago    Sexual activity: Yes     Partners: Male     Birth control/protection: None     Social Determinants of Health     Financial Resource Strain:     Difficulty of Paying Living Expenses:    Food Insecurity:     Worried About Running Out of Food in the Last Year:     Ran Out of Food in the Last Year:    Transportation Needs:     Lack of Transportation (Medical):      Lack of Transportation (Non-Medical):    Physical Activity:     Days of Exercise per Week:     Minutes of Exercise per Session:    Stress:     Feeling of Stress :    Social Connections:     Frequency of Communication with Friends and Family:     Frequency of Social Gatherings with Friends and Family:     Attends [employer] Services:     Active Member of Clubs or Organizations:     Attends Club or Organization Meetings:     Marital Status:        FAMILY HISTORY  Family History   Problem Relation Age of Onset    Heart Attack Mother 61    Cancer Mother     Hypertension Mother     Coronary Artery Disease Mother     Cancer Other     Heart Disease Other     Stroke Other     Diabetes Other     Hypertension Other     Psychiatric Disorder Other          REVIEW OF SYSTEMS  Review of Systems   Constitutional: Negative for chills, fever and weight loss. Respiratory: Negative for shortness of breath. Cardiovascular: Negative for chest pain. Gastrointestinal: Negative for constipation. Negative for fecal incontinence    Genitourinary: Negative for dysuria. Negative for urinary incontinence   Musculoskeletal: Positive for back pain, myalgias and neck pain. Skin: Negative for rash. Neurological: Positive for tingling ( RLE; hands, feet). Negative for dizziness, tremors, focal weakness and headaches. Endo/Heme/Allergies: Does not bruise/bleed easily. Psychiatric/Behavioral: The patient does not have insomnia. PHYSICAL EXAMINATION  Visit Vitals  /74 (BP 1 Location: Right arm, BP Patient Position: Sitting, BP Cuff Size: Adult)   Pulse 78   Temp 97.5 °F (36.4 °C) (Skin)   Ht 5' 4\" (1.626 m)   Wt 152 lb (68.9 kg)   SpO2 100%   BMI 26.09 kg/m²         Pain Assessment  9/23/2021   Location of Pain Back   Severity of Pain 8   Quality of Pain Dull;Aching; Other (Comment)   Quality of Pain Comment stiff, pulling   Frequency of Pain Constant   Aggravating Factors Walking;Standing;Exercise;Kneeling;Straightening;Stretching   Relieving Factors Nothing         Constitutional:  Well developed, well nourished, in no acute distress. Psychiatric: Affect and mood are appropriate. HEENT: Normocephalic, atraumatic. Extraocular movements intact. Integumentary: No rashes or abrasions noted on exposed areas. Cardiovascular: Regular rate and rhythm. Pulmonary: Clear to auscultation bilaterally. SPINE/MUSCULOSKELETAL EXAM    Cervical spine:  Neck is midline. Normal muscle tone. No focal atrophy is noted. ROM pain free. Shoulder ROM intact. Diffuse tenderness to palpation. Negative Spurling's sign. Negative Tinel's sign. Negative Perkins's sign. Sensation in the bilateral arms grossly intact to light touch.      Lumbar spine:  No rash, ecchymosis, or gross obliquity. No fasciculations. No focal atrophy is noted. No pain with hip ROM. Full range of motion. Diffuse tenderness to palpation. No tenderness to palpation at the sciatic notch. SI joints non-tender. Trochanters non tender. Sensation in the bilateral legs grossly intact to light touch. Extremities:  Tenderness of RUE      MOTOR:      Biceps  Triceps Deltoids Wrist Ext Wrist Flex Hand Intrin   Right 5/5 5/5 5/5 5/5 5/5 5/5   Left 5/5 5/5 5/5 5/5 5/5 5/5             Hip Flex  Quads Hamstrings Ankle DF EHL Ankle PF   Right 5/5 5/5 5/5 5/5 5/5 5/5   Left 5/5 5/5 5/5 5/5 5/5 5/5     DTRs are 2+ biceps, triceps, brachioradialis, patella, and Achilles. Negative Straight Leg raise. Squat not tested. No difficulty with tandem gait. Ambulation without assistive device. FWB. RADIOGRAPHS/DATA  Lumbar MRI images taken on 8/9/21 personally reviewed with patient:  Vertebral bodies: Lumbar vertebral body height and alignment are within normal limits. Disc spaces: Unremarkable. Conus: Terminates at L1. Soft tissues: Unremarkable. LEVELS:  L1-2: Unremarkable. L2-3: Unremarkable. L3-4: Unremarkable. L4-5: Mild disc bulge. Moderate facet arthropathy. Mild lateral recess stenosis. Mild bilateral foraminal stenosis. No central canal stenosis. No significant change. L5-S1: Mild disc bulge. Mild facet arthropathy. No stenosis. Degenerative changes have mildly progressed since prior study. IMPRESSION:  1. Mild lateral recess stenosis at L4-5.  2. Elsewhere, no high-grade central canal stenosis at any lumbar level. 3. Degenerative changes. Cervical MRI images taken on 8/10/21 personally reviewed with patient:  Vertebral bodies: Cervical vertebral body height and alignment are within normal limits. Disc spaces: mild to moderate disc space narrowing at every cervical disc level. Multilevel small anterior osteophytes.   Craniocervical junction: unremarkable. Cervical Spinal Cord: Tiny focal syrinx at C6-C7, which appears unchanged. No focal cord expansion or abnormal edema. No other focal areas of signal abnormality involving the cervical spinal cord. Soft tissues: Unremarkable. LEVELS:  C2-3: Unremarkable,  C3-4: Mild disc bulge. No high-grade central canal or foraminal stenosis. C4-5: Diffuse disc osteophyte complex. Mild central canal and bilateral foraminal stenosis. No significant change. C5-6: Disc osteophyte complex. This is asymmetric to the left. Mld central canal stenosis Modeate left and mild right foraminal stenosis due to facet arthropathy and uncovertebral degenerative changes. This has mildly progressed since previous study,  C6-7: Disc osteophyte complex. Mild central canal stenosis. Mild left foraminal stenosis due to facet arthropathy and uncovertebral degenerative change. Findings have mildly progressed since previous study. C7-T1: Mild facet arthropathy. Mild bilateral foraminal stenosis. No high-grade central canal stenosis. Degenerative changes have mildly progressed since prior study. IMPRESSION:  1. Mild central canal stenosis at C4-5, C5-6, and C6-7.  2. Multilevel degenerative changes and foraminal stenoses. 3.Tiny focal syrinx involving the cervical spinal cord at C6-7. This appears unchanged.  reviewed    Ms. Gale Perkins has a reminder for a \"due or due soon\" health maintenance. I have asked that she contact her primary care provider for follow-up on this health maintenance. 40 minutes of face-to-face contact were spent with the patient during today's visit extensively discussing symptoms and treatment plan. All questions were answered. More than half of this visit today was spent on counseling. Written by Hayder Melchor, as dictated by Dr. Elgin Garrett. I, Dr. Elgin Garrett, confirm that all documentation is accurate.

## 2021-09-23 NOTE — PROGRESS NOTES
Oscar Valdes presents today for   Chief Complaint   Patient presents with    Back Pain       Is someone accompanying this pt? no    Is the patient using any DME equipment during OV? no    Depression Screening:  No flowsheet data found. Learning Assessment:  No flowsheet data found. Abuse Screening:  No flowsheet data found. Fall Risk  No flowsheet data found. OPIOID RISK TOOL  No flowsheet data found. Coordination of Care:  1. Have you been to the ER, urgent care clinic since your last visit? no  Hospitalized since your last visit? no    2. Have you seen or consulted any other health care providers outside of the [address] [place] [address] since your last visit? no Include any pap smears or colon screening.  no

## 2021-09-28 ENCOUNTER — TELEPHONE (OUTPATIENT)
Dept: ORTHOPEDIC SURGERY | Age: 51
End: 2021-09-28

## 2021-09-28 NOTE — TELEPHONE ENCOUNTER
Attempted to contact the pt about her referral. The line was busy. It does not look like it has been faxed yet. This was just ordered on 09/23/21. It will be faxed soon by the referral coordinator. Pt will need to give it a little more time.

## 2021-09-28 NOTE — TELEPHONE ENCOUNTER
Patient called stating she was referred to Dr. Danilo Marcus, and has not heard anything from that office. She stated she was advised to call if she has not heard anything. Please advise patient at [phone].

## 2021-09-30 NOTE — TELEPHONE ENCOUNTER
Patient called in again in regards to the previous message. Patient wad advised of previous response and patient verbalized understanding.

## 2021-10-06 NOTE — TELEPHONE ENCOUNTER
Patient spoke with Clara Craig at Dr. Iwona Gracia' office. They informed patient they do not have a referral from us. Please fax to either number below to Dr. Iwona Gracia' office.     [phone] or [phone]  Patient [phone]

## 2021-12-28 ENCOUNTER — HOSPITAL ENCOUNTER (EMERGENCY)
Age: 51
Discharge: STILL ADMITTED OR ADMINISTRATIVE | End: 2021-12-28
Payer: MEDICAID

## 2021-12-28 PROCEDURE — 75810000275 HC EMERGENCY DEPT VISIT NO LEVEL OF CARE

## 2022-01-03 ENCOUNTER — HOSPITAL ENCOUNTER (EMERGENCY)
Age: 52
Discharge: HOME | End: 2022-01-03
Attending: STUDENT IN AN ORGANIZED HEALTH CARE EDUCATION/TRAINING PROGRAM
Payer: MEDICAID

## 2022-01-03 VITALS
DIASTOLIC BLOOD PRESSURE: 89 MMHG | OXYGEN SATURATION: 100 % | WEIGHT: 140 LBS | HEIGHT: 64 IN | RESPIRATION RATE: 16 BRPM | BODY MASS INDEX: 23.9 KG/M2 | TEMPERATURE: 98.6 F | HEART RATE: 72 BPM | SYSTOLIC BLOOD PRESSURE: 124 MMHG

## 2022-01-03 DIAGNOSIS — M54.41 ACUTE RIGHT-SIDED LOW BACK PAIN WITH RIGHT-SIDED SCIATICA: Primary | ICD-10-CM

## 2022-01-03 PROCEDURE — 96372 THER/PROPH/DIAG INJ SC/IM: CPT

## 2022-01-03 PROCEDURE — 99281 EMR DPT VST MAYX REQ PHY/QHP: CPT

## 2022-01-03 PROCEDURE — 74011250636 HC RX REV CODE- 250/636: Performed by: PHYSICIAN ASSISTANT

## 2022-01-03 RX ORDER — KETOROLAC TROMETHAMINE 15 MG/ML
15 INJECTION, SOLUTION INTRAMUSCULAR; INTRAVENOUS
Status: COMPLETED | OUTPATIENT
Start: 2022-01-03 | End: 2022-01-03

## 2022-01-03 RX ORDER — LIDOCAINE 50 MG/G
PATCH TOPICAL
Qty: 30 EACH | Refills: 0 | Status: SHIPPED | OUTPATIENT
Start: 2022-01-03

## 2022-01-03 RX ORDER — NAPROXEN 500 MG/1
500 TABLET ORAL 2 TIMES DAILY WITH MEALS
Qty: 20 TABLET | Refills: 0 | Status: SHIPPED | OUTPATIENT
Start: 2022-01-03 | End: 2022-01-13

## 2022-01-03 RX ORDER — ASCORBIC ACID 500 MG
TABLET ORAL
COMMUNITY

## 2022-01-03 RX ORDER — QUETIAPINE FUMARATE 50 MG/1
50 TABLET, FILM COATED ORAL
COMMUNITY

## 2022-01-03 RX ORDER — CYCLOBENZAPRINE HCL 5 MG
10 TABLET ORAL 3 TIMES DAILY
Qty: 12 TABLET | Refills: 0 | Status: SHIPPED | OUTPATIENT
Start: 2022-01-03

## 2022-01-03 RX ORDER — PRAZOSIN HYDROCHLORIDE 1 MG/1
1 CAPSULE ORAL
COMMUNITY

## 2022-01-03 RX ADMIN — KETOROLAC TROMETHAMINE 15 MG: 15 INJECTION, SOLUTION INTRAMUSCULAR; INTRAVENOUS at 17:13

## 2022-01-03 NOTE — Clinical Note
[address] [place] [address] [facility]  [address] [facility] [address] [phone]    Work/School Note    Date: 1/3/2022    To Whom It May concern:    Estephanie Collier was seen and treated today in the emergency room by the following provider(s):  Attending Provider: Rishabh Ahn DO  Physician Assistant: Cleo Austin. Estephanie Collier is excused from work/school on 1/3/2022 through 1/5/2022. She is medically clear to return to work/school on 1/6/2022.          Sincerely,          Jodene Goodpasture, PA

## 2022-01-03 NOTE — ED PROVIDER NOTES
EMERGENCY DEPARTMENT HISTORY AND PHYSICAL EXAM    4:20 PM      Date: 1/3/2022  Patient Name: Raissa Coates    History of Presenting Illness     Chief Complaint   Patient presents with    Back Pain         History Provided By: Patient    Additional History (Context): Raissa Coates is a 45 y.o. female with hx of anxiety, depression, sciatica, and other noted PMH who presents with complaint of right-sided low back pain with radiation to the right leg x 3 days. Patient notes pain is typical of previous flares of sciatica. Notes she usually receives Toradol and symptoms improve. Has tried over-the-counter medication for symptoms without relief. Denies fever chills, abdominal pain, flank pain, nausea or vomiting, dysuria, hematuria, urinary bowel incontinence or retention, saddle anesthesia, fall or trauma. Denies history of IVDA, history of cancer. During triage screening, pt also notes intermittent suicidal ideation x years. Pt denies active SI, HI, visual or auditory hallucinations. Denies alcohol or drug use. Notes she is on medication for depression. Notes she has a therapist and . Notes she does not feel she needs to be evaluated for symptoms. \"There's nothing new today, I've been deal with it off and on for years\". PCP: Nirali Lantigua MD    Current Facility-Administered Medications   Medication Dose Route Frequency Provider Last Rate Last Admin    ketorolac (TORADOL) injection 15 mg  15 mg IntraMUSCular NOW CELESTINA Gipson         Current Outpatient Medications   Medication Sig Dispense Refill    ascorbic acid, vitamin C, (Vitamin C) 500 mg tablet Take  by mouth.  QUEtiapine (SEROqueL) 50 mg tablet Take 50 mg by mouth nightly.  prazosin (MINIPRESS) 1 mg capsule Take 1 mg by mouth nightly.       tranexamic acid (LYSTEDA) 650 mg tab tablet TAKE 2 TABLETS BY MOUTH 3 TIMES A DAY FOR 5 DAYS AT ONSET OF MENSES      hydrOXYzine pamoate (VISTARIL) 25 mg capsule Take 25 mg by mouth nightly.  ferrous sulfate (Iron) 325 mg (65 mg iron) tablet Take  by mouth two (2) times a day.  sertraline (Zoloft) 100 mg tablet Take 100 mg by mouth nightly.  cyanocobalamin (VITAMIN B12) 500 mcg tablet Take 500 mcg by mouth daily.  methocarbamol (ROBAXIN-750) 750 mg tablet Take 1 Tab by mouth four (4) times daily. 20 Tab 0       Past History     Past Medical History:  Past Medical History:   Diagnosis Date    Anxiety     Cigarette smoker     Dyspnea on exertion 2011    Negative cardiac and pulmonary workup    Heart murmur     Heart murmur     History of gestational diabetes     Musculoskeletal disorder     DDD    Neurological disorder     migraines    Ovarian cyst        Past Surgical History:  Past Surgical History:   Procedure Laterality Date    COLONOSCOPY N/A 2021    COLONOSCOPY performed by Germaine Banda MD at [facility] ENDOSCOPY    HX  SECTION      HX TUBAL LIGATION         Family History:  Family History   Problem Relation Age of Onset    Heart Attack Mother 61    Cancer Mother     Hypertension Mother     Coronary Art Dis Mother     Cancer Other     Heart Disease Other     Stroke Other     Diabetes Other     Hypertension Other     Psychiatric Disorder Other        Social History:  Social History     Tobacco Use    Smoking status: Former Smoker     Packs/day: 1.00     Years: 10.00     Pack years: 10.00     Quit date: 2013     Years since quittin.9    Smokeless tobacco: Current User    Tobacco comment: vaping   Substance Use Topics    Alcohol use: No    Drug use: No     Comment:  quit cocaine 20 years ago       Allergies:  No Known Allergies      Review of Systems       Review of Systems   Constitutional: Negative for chills and fever. Respiratory: Negative for shortness of breath. Cardiovascular: Negative for chest pain. Gastrointestinal: Negative for abdominal pain, nausea and vomiting.    Musculoskeletal: Positive for back pain. Skin: Negative for rash. Neurological: Negative for weakness. Psychiatric/Behavioral: Positive for dysphoric mood and suicidal ideas. All other systems reviewed and are negative. Physical Exam     Visit Vitals  /89 (BP 1 Location: Left upper arm, BP Patient Position: At rest)   Pulse 72   Temp 98.6 °F (37 °C)   Resp 16   Ht 5' 4\" (1.626 m)   Wt 63.5 kg (140 lb)   LMP 12/20/2021   SpO2 100%   BMI 24.03 kg/m²         Physical Exam  Vitals and nursing note reviewed. Constitutional:       General: She is not in acute distress. Appearance: She is well-developed. She is not ill-appearing, toxic-appearing or diaphoretic. HENT:      Head: Normocephalic and atraumatic. Cardiovascular:      Rate and Rhythm: Normal rate and regular rhythm. Heart sounds: Normal heart sounds. No murmur heard. No friction rub. No gallop. Pulmonary:      Effort: Pulmonary effort is normal. No respiratory distress. Breath sounds: Normal breath sounds. No wheezing or rales. Abdominal:      General: Bowel sounds are normal. There is no distension. Palpations: Abdomen is soft. Tenderness: There is no abdominal tenderness. There is no right CVA tenderness, left CVA tenderness, guarding or rebound. Musculoskeletal:         General: Normal range of motion. Cervical back: Normal range of motion and neck supple. Lumbar back: Tenderness (right lumbar paravertebrals TTP) present. No swelling, edema, deformity, spasms or bony tenderness. Normal range of motion. No scoliosis. Comments: No saddle anesthesia, + SLR R, full ROM of hip and knee, normal gait    Skin:     General: Skin is warm and dry. Findings: No rash. Neurological:      Mental Status: She is alert.       Gait: Gait normal.   Psychiatric:         Attention and Perception: Attention normal.         Mood and Affect: Mood normal.         Speech: Speech normal.         Behavior: Behavior normal. Thought Content: Thought content does not include homicidal or suicidal ideation. Thought content does not include homicidal or suicidal plan. Diagnostic Study Results     Labs -  No results found for this or any previous visit (from the past 12 hour(s)). Radiologic Studies -   No orders to display         Medical Decision Making   I am the first provider for this patient. I reviewed the vital signs, available nursing notes, past medical history, past surgical history, family history and social history. Vital Signs-Reviewed the patient's vital signs. Records Reviewed: Nursing Notes and Old Medical Records (Time of Review: 4:20 PM)    ED Course: Progress Notes, Reevaluation, and Consults:  5:10 PM  Reviewed plan with patient. Discussed need for close outpatient follow-up this week for reassessment. Discussed strict return precautions, including fever, abdominal pain, or any other medical concerns. Discussed importance of close outpatient follow-up with therapist and , strict return precautions for SI, HI, or any worsening symptoms. Patient verbalized understanding. Provider Notes (Medical Decision Making): 62-year-old female who presents the ED due to right-sided low back pain with radiation to right leg x3 days. Afebrile, nontoxic-appearing, looks well. No fall or trauma, red flag symptoms. Do not feel labs or imaging are warranted. Stable for discharge with symptomatic management, close outpatient follow-up for further assessment. Strict return precautions provided. Diagnosis     Clinical Impression: No diagnosis found. Disposition: home     Follow-up Information    None          Patient's Medications   Start Taking    No medications on file   Continue Taking    ASCORBIC ACID, VITAMIN C, (VITAMIN C) 500 MG TABLET    Take  by mouth. CYANOCOBALAMIN (VITAMIN B12) 500 MCG TABLET    Take 500 mcg by mouth daily.     FERROUS SULFATE (IRON) 325 MG (65 MG IRON) TABLET Take  by mouth two (2) times a day. HYDROXYZINE PAMOATE (VISTARIL) 25 MG CAPSULE    Take 25 mg by mouth nightly. METHOCARBAMOL (ROBAXIN-750) 750 MG TABLET    Take 1 Tab by mouth four (4) times daily. PRAZOSIN (MINIPRESS) 1 MG CAPSULE    Take 1 mg by mouth nightly. QUETIAPINE (SEROQUEL) 50 MG TABLET    Take 50 mg by mouth nightly. SERTRALINE (ZOLOFT) 100 MG TABLET    Take 100 mg by mouth nightly. TRANEXAMIC ACID (LYSTEDA) 650 MG TAB TABLET    TAKE 2 TABLETS BY MOUTH 3 TIMES A DAY FOR 5 DAYS AT ONSET OF MENSES   These Medications have changed    No medications on file   Stop Taking    DIAZEPAM (VALIUM) 5 MG TABLET    Take 1 Tablet by mouth three (3) times daily as needed for Anxiety (spasm). Max Daily Amount: 15 mg. HYDROXYZINE HCL (ATARAX) 10 MG TABLET    Take 10 mg by mouth nightly. IBUPROFEN (MOTRIN) 600 MG TABLET    Take 1 Tablet by mouth every six (6) hours as needed for Pain. LIDOCAINE (SALONPAS, LIDOCAINE,) 4 % PATCH    Apply up to twice a day to area of pain    PREDNISONE (DELTASONE) 20 MG TABLET        TRAZODONE (DESYREL) 100 MG TABLET    Take 100 mg by mouth nightly. Dictation disclaimer:  Please note that this dictation was completed with [employer], the [employer] voice recognition software. Quite often unanticipated grammatical, syntax, homophones, and other interpretive errors are inadvertently transcribed by the computer software. Please disregard these errors. Please excuse any errors that have escaped final proofreading. EKG, Chest xray, Lab Work, Cardiac Monitor, Reassess

## 2022-01-03 NOTE — Clinical Note
[address] [place] [address] [facility]  [address] [facility] [address] [facility] [address] [address] [phone]    Work/School Note    Date: 1/3/2022    To Whom It May concern:    Kaylee Guillermo was seen and treated today in the emergency room by the following provider(s):  Attending Provider: Fina Ahn DO  Physician Assistant: Rajni Keith, [address]. Kaylee Giullermo is excused from work/school on 1/3/2022 through 1/5/2022. She is medically clear to return to work/school on 1/6/2022.          Sincerely,          CELESTINA Perdue

## 2022-04-28 ENCOUNTER — TRANSCRIBE ORDER (OUTPATIENT)
Dept: REGISTRATION | Age: 52
End: 2022-04-28

## 2022-04-28 ENCOUNTER — HOSPITAL ENCOUNTER (OUTPATIENT)
Dept: GENERAL RADIOLOGY | Age: 52
Discharge: HOME | End: 2022-04-28
Payer: MEDICAID

## 2022-04-28 DIAGNOSIS — M25.561 RIGHT KNEE PAIN: Primary | ICD-10-CM

## 2022-04-28 DIAGNOSIS — M25.561 RIGHT KNEE PAIN: ICD-10-CM

## 2022-04-28 PROCEDURE — 73560 X-RAY EXAM OF KNEE 1 OR 2: CPT

## 2022-07-20 ENCOUNTER — HOSPITAL ENCOUNTER (OUTPATIENT)
Dept: LAB | Age: 52
Discharge: HOME | End: 2022-07-20

## 2022-07-20 ENCOUNTER — HOSPITAL ENCOUNTER (OUTPATIENT)
Dept: LAB | Age: 52
Discharge: HOME | End: 2022-07-20
Payer: MEDICAID

## 2022-07-20 LAB
ATRIAL RATE: 74 BPM
CALCULATED P AXIS, ECG09: 54 DEGREES
CALCULATED R AXIS, ECG10: -5 DEGREES
CALCULATED T AXIS, ECG11: 27 DEGREES
DIAGNOSIS, 93000: NORMAL
P-R INTERVAL, ECG05: 150 MS
Q-T INTERVAL, ECG07: 378 MS
QRS DURATION, ECG06: 86 MS
QTC CALCULATION (BEZET), ECG08: 419 MS
VENTRICULAR RATE, ECG03: 74 BPM
XX-LABCORP SPECIMEN COL,LCBCF: NORMAL

## 2022-07-20 PROCEDURE — 99001 SPECIMEN HANDLING PT-LAB: CPT

## 2022-07-20 PROCEDURE — 93005 ELECTROCARDIOGRAM TRACING: CPT

## 2022-08-01 ENCOUNTER — TELEPHONE (OUTPATIENT)
Dept: PHYSICAL THERAPY | Age: 52
End: 2022-08-01

## 2022-08-05 ENCOUNTER — HOSPITAL ENCOUNTER (OUTPATIENT)
Dept: PHYSICAL THERAPY | Age: 52
Discharge: HOME | End: 2022-08-05
Payer: MEDICAID

## 2022-08-05 PROCEDURE — 97161 PT EVAL LOW COMPLEX 20 MIN: CPT

## 2022-08-05 NOTE — PROGRESS NOTES
PT DAILY TREATMENT NOTE     Patient Name: Tevin Romeo  Date:2022  : 1970  [x]  Patient  Verified  Payor: Stefania Almonte / Plan: [facility] [employer] / Product Type: Managed Care Medicaid /    In time:11:19  Out time:11:58  Total Treatment Time (min): 39  Visit #: 1 of 8    Treatment Area: Left knee pain [M25.562]  Right knee pain [M25.561]    SUBJECTIVE  Pain Level (0-10 scale): 8  Any medication changes, allergies to medications, adverse drug reactions, diagnosis change, or new procedure performed?: [x] No    [] Yes (see summary sheet for update)  Subjective functional status/changes:   [] No changes reported  The pt reports constant knee pain bilaterally that has been worsening     OBJECTIVE    21 min [x]Eval                  []Re-Eval       10 min Therapeutic Exercise:  [] See flow sheet :   Rationale: increase ROM and increase strength to improve the patients ability to perform daily tasks     8 min Therapeutic Activity:  []  See flow sheet :   Rationale:  pt education and instruction   to improve the patients ability to self manage symptoms and maximize therapeutic effect      With   [] TE   [] TA   [] neuro   [] other: Patient Education: [x] Review HEP    [] Progressed/Changed HEP based on:   [] positioning   [] body mechanics   [] transfers   [] heat/ice application    [] other:      Other Objective/Functional Measures:      Pain Level (0-10 scale) post treatment: 8    ASSESSMENT/Changes in Function: see POC    Patient will continue to benefit from skilled PT services to modify and progress therapeutic interventions, address functional mobility deficits, address ROM deficits, address strength deficits, analyze and address soft tissue restrictions, analyze and cue movement patterns, and analyze and modify body mechanics/ergonomics to attain remaining goals.      [x]  See Plan of Care  []  See progress note/recertification  []  See Discharge Summary         Progress towards goals / Updated goals:  Short Term Goals: To be accomplished in 2 weeks:  Pt will demonstrate I and compliance with HEP to maximize therapeutic effect. IE: HEP issued and instructed  Pt will demonstrate left knee flexion AROM to 100 deg for improved ease of dressing. IE: 88 deg  Long Term Goals: To be accomplished in 4 weeks:  Pt will demonstrate B knee flexion AROM to 115 deg for improved ease of transfers. IE: 105 deg right  88 deg left  Pt will demonstrate 10 SLR bilaterally without pain increase to improve quad function with ADLs. IE: pain and challenge with 2 reps in 50% ROM  Pt will report 50% improvement in pain levels to improve quality of life. IE: 0%  Pt will improve FOTO score to 26 to demonstrate improved functional capacity.   IE: 13    PLAN  []  Upgrade activities as tolerated     [x]  Continue plan of care  []  Update interventions per flow sheet       []  Discharge due to:_  []  Other:_      Alcira Kulkarni DPT CMTPT 8/5/2022  12:51 PM    Future Appointments   Date Time Provider Tirso Clayton   8/8/2022 10:00 AM Fransisca Kruse [facility]   8/12/2022 10:30 AM Darline Carroll, PTA [facility]   8/15/2022 12:00 PM Radhames Faith, PTA [facility]   8/19/2022 11:15 AM Jorge Ware [facility]   8/22/2022 11:15 AM Darline Carroll, PTA [facility]   8/26/2022 10:30 AM Baldev Rodriges, PT [facility]   8/29/2022 10:45 AM Brinda Stevens [facility]

## 2022-08-08 ENCOUNTER — TELEPHONE (OUTPATIENT)
Dept: PHYSICAL THERAPY | Age: 52
End: 2022-08-08

## 2022-08-08 ENCOUNTER — APPOINTMENT (OUTPATIENT)
Dept: PHYSICAL THERAPY | Age: 52
End: 2022-08-08
Payer: MEDICAID

## 2022-08-08 NOTE — TELEPHONE ENCOUNTER
Called patient and LVM to follow up on a VM she left this morning 8/8 to cxl today's visit and only do friday's

## 2022-08-12 ENCOUNTER — HOSPITAL ENCOUNTER (OUTPATIENT)
Dept: PHYSICAL THERAPY | Age: 52
Discharge: HOME | End: 2022-08-12
Payer: MEDICAID

## 2022-08-12 PROCEDURE — 97110 THERAPEUTIC EXERCISES: CPT

## 2022-08-12 PROCEDURE — 97530 THERAPEUTIC ACTIVITIES: CPT

## 2022-08-12 PROCEDURE — 97112 NEUROMUSCULAR REEDUCATION: CPT

## 2022-08-12 NOTE — PROGRESS NOTES
PT DAILY TREATMENT NOTE     Patient Name: Edi Parsons  Date:2022  : 1970  [x]  Patient  Verified  Payor: Paris Coto / Plan: [facility] [employer] / Product Type: Managed Care Medicaid /    In time:1:30  Out time:2:20  Total Treatment Time (min): 50  Visit #: 2 of 8    Medicare/BCBS Only   Total Timed Codes (min):  40 1:1 Treatment Time:  40       Treatment Area: Left knee pain [M25.562]  Right knee pain [M25.561]    SUBJECTIVE  Pain Level (0-10 scale): 8/10  Any medication changes, allergies to medications, adverse drug reactions, diagnosis change, or new procedure performed?: [x] No    [] Yes (see summary sheet for update)  Subjective functional status/changes:   [] No changes reported  Pt reports no change in symptoms since IE. OBJECTIVE    Modality rationale: decrease inflammation and decrease pain to improve the patients ability to perform ADLs with increased ease.     Min Type Additional Details    [] Estim:  []Unatt       []IFC  []Premod                        []Other:  []w/ice   []w/heat  Position:  Location:    [] Estim: []Att    []TENS instruct  []NMES                    []Other:  []w/US   []w/ice   []w/heat  Position:  Location:    []  Traction: [] Cervical       []Lumbar                       [] Prone          []Supine                       []Intermittent   []Continuous Lbs:  [] before manual  [] after manual    []  Ultrasound: []Continuous   [] Pulsed                           []1MHz   []3MHz W/cm2:  Location:    []  Iontophoresis with dexamethasone         Location: [] Take home patch   [] In clinic   10 [x]  Ice     []  heat  []  Ice massage  []  Laser   []  Anodyne Position:supine  Location:bilateral shoulder    []  Laser with stim  []  Other:  Position:  Location:    []  Vasopneumatic Device    []  Right     []  Left  Pre-treatment girth:  Post-treatment girth:  Measured at (location):  Pressure:       [] lo [] med [] hi   Temperature: [] lo [] med [] hi   [] Skin assessment post-treatment:  []intact []redness- no adverse reaction    []redness - adverse reaction:     10 min Therapeutic Exercise:  [x] See flow sheet :   Rationale: increase ROM and increase strength to improve the patients ability to perform ADLs with increased ease. 10 min Therapeutic Activity:  [x]  See flow sheet :   Rationale: increase strength, improve coordination, and increase proprioception  to improve the patients ability to perform ADLs with increased ease. 10 min Neuromuscular Re-education:  [x]  See flow sheet :   Rationale: increase strength, improve coordination, and increase proprioception  to improve the patients ability to perform functional activities with increased ease. With   [] TE   [] TA   [] neuro   [] other: Patient Education: [x] Review HEP    [] Progressed/Changed HEP based on:   [] positioning   [] body mechanics   [] transfers   [] heat/ice application    [] other:      Other Objective/Functional Measures: Initiated exercises as per flow sheet. Pain Level (0-10 scale) post treatment: 8/10    ASSESSMENT/Changes in Function: Pt continues to have pain with open and closed kinetic chain exercises. Patient will continue to benefit from skilled PT services to modify and progress therapeutic interventions, address functional mobility deficits, address ROM deficits, address strength deficits, analyze and address soft tissue restrictions, analyze and cue movement patterns, analyze and modify body mechanics/ergonomics, and assess and modify postural abnormalities to attain remaining goals. []  See Plan of Care  []  See progress note/recertification  []  See Discharge Summary         Progress towards goals / Updated goals:  Short Term Goals: To be accomplished in 2 weeks:  Pt will demonstrate I and compliance with HEP to maximize therapeutic effect.   IE: HEP issued and instructed  Pt will demonstrate left knee flexion AROM to 100 deg for improved ease of dressing. IE: 88 deg  Long Term Goals: To be accomplished in 4 weeks:  Pt will demonstrate B knee flexion AROM to 115 deg for improved ease of transfers. IE: 105 deg right  88 deg left  Pt will demonstrate 10 SLR bilaterally without pain increase to improve quad function with ADLs. IE: pain and challenge with 2 reps in 50% ROM  Pt will report 50% improvement in pain levels to improve quality of life. IE: 0%  Pt will improve FOTO score to 26 to demonstrate improved functional capacity.   IE: 13       PLAN  []  Upgrade activities as tolerated     [x]  Continue plan of care  []  Update interventions per flow sheet       []  Discharge due to:_  []  Other:_      Juan Daniel Zavaleta, PTA 8/12/2022  1:37 PM    Future Appointments   Date Time Provider Tirso Clayton   8/19/2022 11:15 AM Erlin [address] [facility]   8/26/2022 10:30 AM [facility]

## 2022-08-15 ENCOUNTER — APPOINTMENT (OUTPATIENT)
Dept: PHYSICAL THERAPY | Age: 52
End: 2022-08-15
Payer: MEDICAID

## 2022-08-18 ENCOUNTER — HOSPITAL ENCOUNTER (OUTPATIENT)
Dept: GENERAL RADIOLOGY | Age: 52
Discharge: HOME | End: 2022-08-18
Payer: MEDICAID

## 2022-08-18 ENCOUNTER — TRANSCRIBE ORDER (OUTPATIENT)
Dept: REGISTRATION | Age: 52
End: 2022-08-18

## 2022-08-18 DIAGNOSIS — M89.8X2 PAIN IN HUMERUS: ICD-10-CM

## 2022-08-18 DIAGNOSIS — M25.511 RIGHT SHOULDER PAIN: Primary | ICD-10-CM

## 2022-08-18 DIAGNOSIS — M25.511 RIGHT SHOULDER PAIN: ICD-10-CM

## 2022-08-18 PROCEDURE — 73060 X-RAY EXAM OF HUMERUS: CPT

## 2022-08-18 PROCEDURE — 73030 X-RAY EXAM OF SHOULDER: CPT

## 2022-08-19 ENCOUNTER — HOSPITAL ENCOUNTER (OUTPATIENT)
Dept: PHYSICAL THERAPY | Age: 52
Discharge: HOME | End: 2022-08-19
Payer: MEDICAID

## 2022-08-19 PROCEDURE — 97112 NEUROMUSCULAR REEDUCATION: CPT

## 2022-08-19 PROCEDURE — 97110 THERAPEUTIC EXERCISES: CPT

## 2022-08-19 PROCEDURE — 97530 THERAPEUTIC ACTIVITIES: CPT

## 2022-08-19 NOTE — PROGRESS NOTES
PT DAILY TREATMENT NOTE     Patient Name: Le Meek  Date:2022  : 1970  [x]  Patient  Verified  Payor: [facility] MEDICAID / Plan: [facility] [employer] / Product Type: Managed Care Medicaid /    In time:11:17  Out time:12:01  Total Treatment Time (min): 44  Visit #: 3 of 8    Treatment Area: Left knee pain [M25.562]  Right knee pain [M25.561]    SUBJECTIVE  Pain Level (0-10 scale): 7  Any medication changes, allergies to medications, adverse drug reactions, diagnosis change, or new procedure performed?: [x] No    [] Yes (see summary sheet for update)  Subjective functional status/changes:   [] No changes reported  The pt reports feeling mostly the same     OBJECTIVE    24 min Therapeutic Exercise:  [] See flow sheet :   Rationale: increase ROM and increase strength to improve the patients ability to perform daily tasks     8 min Therapeutic Activity:  []  See flow sheet :   Rationale: increase strength and improve coordination  to improve the patients ability to perform ADLs with improved efficiency     8 min Neuromuscular Re-education:  []  See flow sheet :   Rationale: increase ROM, increase strength, and increase proprioception  to improve the patients ability to perform ADLs with improved mechanics and stability    4 min Manual Therapy:  STM bilateral distal quads in sitting at EOB   The manual therapy interventions were performed at a separate and distinct time from the therapeutic activities interventions.   Rationale: decrease pain and increase tissue extensibility to improve ease of ADLs and gait          With   [] TE   [] TA   [] neuro   [] other: Patient Education: [x] Review HEP    [] Progressed/Changed HEP based on:   [] positioning   [] body mechanics   [] transfers   [] heat/ice application    [] other:      Other Objective/Functional Measures: 0     Pain Level (0-10 scale) post treatment: 8    ASSESSMENT/Changes in Function: The pt continues to endorse high levels of pain and swelling of her right knee. She demonstrates WNL knee flexion AROM bilaterally but painful. She reports limited change in function thus far, though limited follow up to date due to 1x/week frequency. Will assess any change in pain over the next 2 visits, continuing with light strength work as able    Patient will continue to benefit from skilled PT services to modify and progress therapeutic interventions, address functional mobility deficits, address ROM deficits, address strength deficits, analyze and address soft tissue restrictions, analyze and cue movement patterns, and analyze and modify body mechanics/ergonomics to attain remaining goals. []  See Plan of Care  []  See progress note/recertification  []  See Discharge Summary         Progress towards goals / Updated goals:  Short Term Goals: To be accomplished in 2 weeks:  Pt will demonstrate I and compliance with HEP to maximize therapeutic effect. IE: HEP issued and instructed  Current: Pt reports compliance but challenged 8/19/2022  Pt will demonstrate left knee flexion AROM to 100 deg for improved ease of dressing. IE: 88 deg  Current: Met 120 deg but 10/10 reported pain 8/19/2022  Long Term Goals: To be accomplished in 4 weeks:  Pt will demonstrate B knee flexion AROM to 115 deg for improved ease of transfers. IE: 105 deg right  88 deg left  Current: Met 121 deg right  120 deg left but high levels of pain 8/19/2022  Pt will demonstrate 10 SLR bilaterally without pain increase to improve quad function with ADLs. IE: pain and challenge with 2 reps in 50% ROM  Pt will report 50% improvement in pain levels to improve quality of life. IE: 0%  Pt will improve FOTO score to 26 to demonstrate improved functional capacity.   IE: 13    PLAN  [x]  Upgrade activities as tolerated     []  Continue plan of care  []  Update interventions per flow sheet       []  Discharge due to:_  []  Other:_      Taz Amezquita DPT CMTPT 8/19/2022  11:21 AM    Future Appointments   Date Time Provider Tirso Clayton   8/26/2022 10:30 AM China Veras, PT [facility]

## 2022-08-22 ENCOUNTER — APPOINTMENT (OUTPATIENT)
Dept: PHYSICAL THERAPY | Age: 52
End: 2022-08-22
Payer: MEDICAID

## 2022-08-26 ENCOUNTER — TELEPHONE (OUTPATIENT)
Dept: PHYSICAL THERAPY | Age: 52
End: 2022-08-26

## 2022-08-29 ENCOUNTER — APPOINTMENT (OUTPATIENT)
Dept: PHYSICAL THERAPY | Age: 52
End: 2022-08-29
Payer: MEDICAID

## 2022-09-02 ENCOUNTER — TELEPHONE (OUTPATIENT)
Dept: PHYSICAL THERAPY | Age: 52
End: 2022-09-02

## 2022-09-02 NOTE — PROGRESS NOTES
[facility]  [address] [facility] [address]  [place], [address]  [phone] fax    Physical Therapy Discharge Summary  Patient name: Alexander Hinojosa Start of Care:  2022   Referral source: Steve Becerra NP : 1970   Medical/Treatment Diagnosis: Left knee pain [M25.562]  Right knee pain [M25.561]  Payor: [facility] MEDICAID / Plan: [facility] COMMUNITY PLAN [facility] / Product Type: Managed Care Medicaid /  Onset Date:     Prior Hospitalization: see medical history Provider#: [id number]   Medications: Verified on Patient Summary List     Comorbidities: arthritis, LBP, depression   Prior Level of Function: Pt with chronic knee pain, reports increased pain and mobility difficulty recently  Visits from Start of Care: 3    Missed Visits: 5  Reporting Period : 2022 to 2022             Progress towards goals / Updated goals:  Short Term Goals:   Pt will demonstrate I and compliance with HEP to maximize therapeutic effect. IE: HEP issued and instructed  DC: Pt reports compliance but challenged   Pt will demonstrate left knee flexion AROM to 100 deg for improved ease of dressing. IE: 88 deg  DC: Met 120 deg but 10/10 reported pain   Long Term Goals:   Pt will demonstrate B knee flexion AROM to 115 deg for improved ease of transfers. IE: 105 deg right  88 deg left  DC: Met 121 deg right  120 deg left but high levels of pain  Pt will demonstrate 10 SLR bilaterally without pain increase to improve quad function with ADLs. IE: pain and challenge with 2 reps in 50% ROM  DC: unable to assess due to lack of attendance  Pt will report 50% improvement in pain levels to improve quality of life. IE: 0%  DC: unable to assess due to lack of attendance  Pt will improve FOTO score to 26 to demonstrate improved functional capacity.   IE: 13  DC: unable to assess due to lack of attendance    ASSESSMENT/RECOMMENDATIONS:  Unable to further assess progress towards goals at this time due to non-compliance/lack of attendance. DC at this time with no further instructions to the patient.   Thank you for this referral.      [x]Discontinue therapy: []Patient has reached or is progressing toward set goals      [x]Patient is non-compliant or has abdicated      []Due to lack of appreciable progress towards set goals    Hannah Corona, KOBE 9/2/2022 4:55 PM  Co-Sign: King Farzaneh DPT CMTPT

## 2022-11-07 ENCOUNTER — HOSPITAL ENCOUNTER (OUTPATIENT)
Dept: PHYSICAL THERAPY | Age: 52
Discharge: HOME | End: 2022-11-07
Payer: MEDICAID

## 2022-11-07 PROCEDURE — 97162 PT EVAL MOD COMPLEX 30 MIN: CPT

## 2022-11-07 NOTE — THERAPY EVALUATION
[facility] [place] [facility] [employer] [facility]  [place]  [address] [facility]  [phone] fax    Plan of Care/ Statement of Necessity for Physical Therapy Services    Patient name: Naren Godoy Start of Care: 2022   Referral source: Michaelle Perkins NP : 1970    Medical Diagnosis: Pain in right knee [M25.561]  Pain in left knee [M25.562]  Payor: [facility] MEDICAID / Plan: [facility] COMMUNITY PLAN [facility] / Product Type: Managed Care Medicaid /  Onset Date:    Treatment Diagnosis: B knee pain   Prior Hospitalization: see medical history Provider#: [id number]   Medications: Verified on Patient summary List    Comorbidities: fibromyalgia, PTSD, arthritis, back pain, bipolar 1 disorder,    Prior Level of Function: lives in 3rd floor apartment (with elevator) with daughter; has shower chair and utility cart; used to run; used to be director for homeless services    The Plan of Care and following information is based on the information from the initial evaluation. Assessment/ key information: Patient is a 79-year-old female who presents to therapy with chief complaint of knee pain beginning 4 years ago. Patient reports she fell onto her back onto concrete. Patient reports she was referred to rheumatologist and was told she had OA. 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 She reports her knees will give out and cause her to fall. She reports she had PT for 4 weeks previously. Symptoms localized to anterior knee; symptom described as sharp and a \"pressure\" to her back and LEs. Symptoms aggravated with sitting, standing, and walking; symptoms reduced with medication. Patient reports some symptoms due to back pain/sciatica. She has been unable to work due to pain.  Exam reveals patient with impaired functional mobility requiring B UE support and keeping right LE extended to complete sit to stand transfers from chair height. She demonstrates decreased B LE strength (pain limiting tolerance to testing). Patient able to maintain tandem stance for 16 sec with left foot posterior and 26 sec with right foot posterior. She presents with edema to right knee which she reports grossly unchanged over the last 2 months. Patient would benefit from skilled outpatient PT to address above mentioned deficits to return to prior level of function, increase independence with ADLs, and improve overall quality of life. Additional testing limited secondary to time constraint. Evaluation Complexity History HIGH Complexity :3+ comorbidities / personal factors will impact the outcome/ POC ; Examination MEDIUM Complexity : 3 Standardized tests and measures addressing body structure, function, activity limitation and / or participation in recreation  ;Presentation HIGH Complexity : Unstable and unpredictable characteristics  ; Clinical Decision Making   Overall Complexity Rating: MEDIUM  Problem List: pain affecting function, decrease ROM, decrease strength, edema affecting function, impaired gait/ balance, decrease ADL/ functional abilitiies, decrease activity tolerance, decrease flexibility/ joint mobility, and decrease transfer abilities   Treatment Plan may include any combination of the following: Therapeutic exercise, Neuromuscular reeducation, Manual therapy, Therapeutic activity, Self care/home management, Electric stim unattended , Vasopneumatic device, and Gait training  Patient / Family readiness to learn indicated by: asking questions and trying to perform skills  Persons(s) to be included in education: patient (P)  Barriers to Learning/Limitations: yes;  emotional, cognitive, and physical  Patient Goal (s): to alleviate the pain  Patient Self Reported Health Status: poor  Rehabilitation Potential: fair    Short Term Goals: To be accomplished in 1 weeks:  1.  Patient will report compliance with initial HEP to optimize therapy outcomes. Status at eval: established  Long Term Goals: To be accomplished in 6 weeks:  1. Patient will improve FOTO score by at least 5 points in order to demonstrate functional improvement. Status at eval: TBD at first follow up visit  2. Patient will report no greater than 6/10 pain to B knees in order to perform functional tasks with increased ease. Status at eval: 10/10  3. Patient will be able to perform at least 5 sit to stand transfers from standard chair height without UE support in order to navigate home and community with increased ease. Status at eval: B UE support and keeping right LE extended  4. Patient will be able to maintain tandem stance for at least 30 sec B in order to perform self care tasks with increased ease. Status at eval: Left posterior 16 sec; right posterior 26 sec  5. Patient will improve B LE strength by at least 1/2 grade with MMT in order to perform functional tasks with increased ease. Status at eval:                                        Strength (1-5)      right left   Hip Flexion 3 P! 2+     Extension         Abduction         Adduction       Knee Flexion 2+ P!  3     Extension 3+  4   Ankle Plantarflexion         Dorsiflexion 4 4-          Right hip ER  3+ IR 4-  Left hip ER 4- IR 3+       Frequency / Duration: Patient to be seen 2 times per week for 6 weeks. Patient/  Caregiver education and instruction: Diagnosis, prognosis, self care, activity modification, and exercises   [x]  Plan of care has been reviewed with KOBE Perez, PT 11/7/2022 8:51 AM    ________________________________________________________________________    I certify that the above Therapy Services are being furnished while the patient is under my care. I agree with the treatment plan and certify that this therapy is necessary.     [de-identified] Signature:____________Date:_________TIME:________     Lida Almanzar NP  ** Signature, Date and Time must be completed for valid certification **    Please sign and return to [facility] - [facility]  [address] [place] [address]  [phone] fax

## 2022-11-07 NOTE — PROGRESS NOTES
PT DAILY TREATMENT NOTE/KNEE EVAL     Patient Name: Pepper Menendez  Date:2022  : 1970  [x]  Patient  Verified  Payor: [facility] MEDICAID / Plan: [facility] PLAN [facility] / Product Type: Managed Care Medicaid /    In time:2:25P  Out time:3:06P  Total Treatment Time (min): 41  Visit #: 1 of 12      Treatment Area: Pain in right knee [M25.561]  Pain in left knee [M25.562]    SUBJECTIVE  Pain Level (0-10 scale): 10/10  []constant []intermittent []improving []worsening []no change since onset    Any medication changes, allergies to medications, adverse drug reactions, diagnosis change, or new procedure performed?: [x] No    [] Yes (see summary sheet for update)  Subjective functional status/changes:        Comorbidities: fibromyalgia, PTSD, arthritis, back pain, bipolar 1 disorder,    Prior Level of Function: lives in 3rd floor apartment (with elevator) with daughter; has shower chair and utility cart; used to run; used to be director for [employer]    The Plan of Care and following information is based on the information from the initial evaluation. Assessment/ key information: Patient is a 65-year-old female who presents to therapy with chief complaint of knee pain beginning 4 years ago. Patient reports she fell onto her back onto concrete. Patient reports she was referred to rheumatologist and was told she had OA. 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 She reports her knees will give out and cause her to fall. She reports she had PT for 4 weeks previously. Symptoms localized to anterior knee; symptom described as sharp and a \"pressure\" to her back and LEs. Symptoms aggravated with sitting, standing, and walking; symptoms reduced with medication. Patient reports some symptoms due to back pain/sciatica. She has been unable to work due to pain.  Exam reveals patient with impaired functional mobility requiring B UE support and keeping right LE extended to complete sit to stand transfers from chair height. She demonstrates decreased B LE strength (pain limiting tolerance to testing). Patient able to maintain tandem stance for 16 sec with left foot posterior and 26 sec with right foot posterior. She presents with edema to right knee which she reports grossly unchanged over the last 2 months. Patient would benefit from skilled outpatient PT to address above mentioned deficits to return to prior level of function, increase independence with ADLs, and improve overall quality of life. Additional testing limited secondary to time constraint.        34 min [x]Eval                  []Re-Eval       7 min Therapeutic Activity:  [x]  See flow sheet : HEP review, patient education   Rationale: increase ROM, increase strength, improve coordination, improve balance, and increase proprioception  to improve the patients ability to perform ADLs with increased eae              With   [] TE   [] TA   [] neuro   [] other: Patient Education: [x] Review HEP    [] Progressed/Changed HEP based on:   [] positioning   [] body mechanics   [] transfers   [] heat/ice application    [x] other: diagnosis, prognosis, POC, purpose and importance of therapy; anatomy and physiology as it relates to current condition;using RW for safety/stability with ambulation; walker skis to assist with glide across surfaces with RW     Other Objective/Functional Measures:     Physical Therapy Evaluation - Knee    Posture: [] Varus    [] Valgus    [] Recurvatum        [] Tibial Torsion    [] Foot Supination    [] Foot Pronation    Describe:    Gait:  [] Normal    [] Abnormal    [] Antalgic    [] NWB    Device:    Describe:    ROM / Strength  [] Unable to assess                  AROM                      PROM                   Strength (1-5)    Left Right Left Right right left   Hip Flexion    2+ 3 P! 3P    Extension          Abduction Adduction         Knee Flexion    3 2+ P!      Extension    4 3+    Ankle Plantarflexion          Dorsiflexion    4- 4        Right hip ER  3+ IR 4-  Left hip ER 4- IR 3+    Flexibility: [] Unable to assess at this time  Hamstrings:    (L) Tightness= [] WNL   [] Min   [] Mod   [] Severe    (R) Tightness= [] WNL   [] Min   [] Mod   [] Severe  Quadriceps:    (L) Tightness= [] WNL   [] Min   [] Mod   [] Severe    (R) Tightness= [] WNL   [] Min   [] Mod   [] Severe  Gastroc:      (L) Tightness= [] WNL   [] Min   [] Mod   [] Severe    (R) Tightness= [] WNL   [] Min   [] Mod   [] Severe  Other:    Palpation:   Neg/Pos  Neg/Pos  Neg/Pos   Joint Line  Quad tendon  Patellar ligament    Patella  Fibular head  Pes Anserinus    Tibial tubercle  Hamstring tendons  Infrapatellar fat pad      Optional Tests:  Patellar Positioning (Static)   []L []R Normal []L []R Lateral   []L []R Dontrell Little      []L []R Medial   []L []R [place]    Patellar Tracking   []L []R Glide (Lat)   []L []R Tilt (Lat)     []L []R Glide (Med)  []L []R Tilt (Med)      []L []R Tile (Inf)     Patellar Mobility   []L []R Hypermobile []L []R Hypomobile         Girth Measurements:     Cm at  Cm above joint line   Cm at   Cm below joint line  Cm at joint line   Left 38       Right  38.5         Lachmans  [] Neg    [] Pos Posterior Drawer [] Neg    [] Pos  Pivot Shift  [] Neg    [] Pos Posterior Sag  [] Neg    [] Pos  JAMAL   [] Neg    [] Pos Anuradha's Test [] Neg    [] Pos  ALRI   [] Neg    [] Pos Squat   [] Neg    [] Pos  Valgus@ 0 Degrees [] Neg    [] Pos Linda-Garfield [] Neg    [] Pos  Valgus@ 30 Degrees [] Neg    [] Pos Patellar Apprehension [] Neg    [] Pos  Varus@ 0 Degrees [] Neg    [] Pos Hernandez's Compression [] Neg    [] Pos  Varus@ 30 Degrees [] Neg    [] Pos Ely's Test  [] Neg    [] Pos  Apley's Compression [] Neg    [] Pos Garret's Test  [] Neg    [] Pos  Apley's Distraction [] Neg    [] Pos Stroke Test  [] Neg    [] Pos   Anterior Drawer [] Neg    [] Pos Fluctuation Test [] Neg    [] Pos  Other:                  [] Neg    [] Pos                 Other tests/comments:       Left posterior 16 sec; right posterior 26 sec    Pain Level (0-10 scale) post treatment: 10/10    ASSESSMENT/Changes in Function: See POC    Patient will continue to benefit from skilled PT services to modify and progress therapeutic interventions, address functional mobility deficits, address ROM deficits, address strength deficits, analyze and address soft tissue restrictions, analyze and cue movement patterns, analyze and modify body mechanics/ergonomics, assess and modify postural abnormalities, address imbalance/dizziness, and instruct in home and community integration to attain remaining goals.      [x]  See Plan of Care  []  See progress note/recertification  []  See Discharge Summary         Progress towards goals / Updated goals:  See POC    PLAN  []  Upgrade activities as tolerated     [x]  Continue plan of care  []  Update interventions per flow sheet       []  Discharge due to:_  []  Other:_      Yuval Kulkarni, PT 11/7/2022  8:51 AM

## 2022-11-11 ENCOUNTER — APPOINTMENT (OUTPATIENT)
Dept: PHYSICAL THERAPY | Age: 52
End: 2022-11-11
Payer: MEDICAID

## 2022-11-17 ENCOUNTER — APPOINTMENT (OUTPATIENT)
Dept: PHYSICAL THERAPY | Age: 52
End: 2022-11-17
Payer: MEDICAID

## 2022-11-21 ENCOUNTER — HOSPITAL ENCOUNTER (OUTPATIENT)
Dept: PHYSICAL THERAPY | Age: 52
Discharge: HOME | End: 2022-11-21
Payer: MEDICAID

## 2022-11-21 PROCEDURE — 97530 THERAPEUTIC ACTIVITIES: CPT

## 2022-11-21 PROCEDURE — 97112 NEUROMUSCULAR REEDUCATION: CPT

## 2022-11-21 NOTE — PROGRESS NOTES
PT DAILY TREATMENT NOTE     Patient Name: Segundo Villa  Date:2022  : 1970  [x]  Patient  Verified  Payor: Tere Rain / Plan: [facility] COMMUNITY PLAN [employer] / Product Type: Managed Care Medicaid /    In time:3:53P Out time:4:48P  Total Treatment Time (min): 55  Visit #: 2 of 12        Treatment Area: Pain in right knee [M25.561]  Pain in left knee [M25.562]    SUBJECTIVE  Pain Level (0-10 scale): 10/10  Any medication changes, allergies to medications, adverse drug reactions, diagnosis change, or new procedure performed?: [x] No    [] Yes (see summary sheet for update)  Subjective functional status/changes:   [] No changes reported    Patient reports she is supposed to be referred to pain management but has trouble understanding how to. She was ok after evaluation. She was told the pain would not go away but that it could be managed. She just saw her PCP last week. She only did the marches twice. Her low back, upper back, and knees today. Her doctor did not say anything about her right knee being swollen. She will tape books to her back for relief; it will put more of a curve in her back and put pressure on her back/hips. The pain in her knees is deep in her kneecap. She tries to keep her MD visits consistent because it is hard for her to remember things. She was asked by her doctor to try to close her eyes and go to a \"happy place\" to practice relaxation; she reports she tried closing her eyes during a treatment session but had a memory of herself in a dark closet balled up and cannot remember why she was there so she does not like to close her eyes anymore. She has 5 bulging discs in her back; 1 in her low back and the rest are higher. She has had chest pain more recently which she attributes to anxiety. She does not go to the ER unless her pain in a \"20/10\".      OBJECTIVE    35 min Therapeutic Activity:  [x]  See flow sheet : patient education; measurements/testing   Rationale: increase strength, improve coordination, and increase proprioception  to improve the patients ability to perform daily tasks with increased ease     20 min Neuromuscular Re-education:  [x]  See flow sheet : diaphragmatic breathing with self tactile feedback with hands placed on chest/stomach and at ribs; review of desensitization for light pressure to LEs at home; assessment of directional preference   Rationale: increase strength, improve coordination, and increase proprioception  to improve the patients ability to perform daily tasks with increased ease              With   [] TE   [x] TA   [] neuro   [] other: Patient Education: [x] Review HEP    [] Progressed/Changed HEP based on:   [x] positioning   [x] body mechanics   [] transfers   [] heat/ice application    [x] other: review of posture in seated with support for back; taking steps for symptom reduction including finding comfortable position then working on distraction from pain like with breathing exercises; even chest rise, rib flare, and stomach rise as optimal with deeper breathing     Other Objective/Functional Measures:      Session abbreviated due to patient arriving late to appt  Preference to reverse with bike  Edema noted to right knee in sitting  TTP to area surrounding B patellas  Pain reported with medial plica shelf test on right; unable to formally assess on left due to TTP  Pain with small range trunk flex reduced with seated trunk ext with towel at small of back  Poor form with diaphragmatic breathing with increased chest rise and min stomach rise/rib flare      Pain Level (0-10 scale) post treatment: 8/10 back, 10/10 knee    ASSESSMENT/Changes in Function: Patient continues to report overall severe pain levels to back and B knees as well as continued increased edema to right knee; pain and edema grossly unchanged since initial evaluation.  She demonstrates extension directional preference with min reduction in pain following repeated ext and sitting with towel behind back. She demonstrates allodynia with light pressure to B LEs and was educated on desensitization to address. Patient with max difficulty with relaxation techniques and impaired form with diaphragmatic breathing. Patient will continue to benefit from skilled PT services to modify and progress therapeutic interventions, address functional mobility deficits, address ROM deficits, address strength deficits, analyze and address soft tissue restrictions, analyze and cue movement patterns, analyze and modify body mechanics/ergonomics, assess and modify postural abnormalities, address imbalance/dizziness, and instruct in home and community integration to attain remaining goals. Progress towards goals / Updated goals:  Short Term Goals: To be accomplished in 1 weeks:  1. Patient will report compliance with initial HEP to optimize therapy outcomes. Status at eval: established  Long Term Goals: To be accomplished in 6 weeks:  1. Patient will improve FOTO score by at least 5 points in order to demonstrate functional improvement. Status at eval: TBD at first follow up visit  2. Patient will report no greater than 6/10 pain to B knees in order to perform functional tasks with increased ease. Status at eval: 10/10  3. Patient will be able to perform at least 5 sit to stand transfers from standard chair height without UE support in order to navigate home and community with increased ease. Status at eval: B UE support and keeping right LE extended  4. Patient will be able to maintain tandem stance for at least 30 sec B in order to perform self care tasks with increased ease. Status at eval: Left posterior 16 sec; right posterior 26 sec  5. Patient will improve B LE strength by at least 1/2 grade with MMT in order to perform functional tasks with increased ease.                Status at eval: Strength (1-5)      right left   Hip Flexion 3 P!  2+     Extension         Abduction         Adduction       Knee Flexion 2+ P!  3     Extension 3+  4   Ankle Plantarflexion         Dorsiflexion 4 4-          Right hip ER  3+ IR 4-  Left hip ER 4- IR 3+         PLAN  []  Upgrade activities as tolerated     [x]  Continue plan of care  []  Update interventions per flow sheet       []  Discharge due to:_  []  Other:_      Julian Mcelroy, PT 11/21/2022  9:00 AM    Future Appointments   Date Time Provider Tirso Clayton   11/21/2022  3:45 PM Colleen Kumar, PT [id number] [facility]   12/1/2022 12:45 PM Lola Granado, PT [id number] [facility]   12/7/2022  1:30 PM Colleen Kumar, PT [id number] [facility]

## 2022-12-01 ENCOUNTER — APPOINTMENT (OUTPATIENT)
Dept: PHYSICAL THERAPY | Age: 52
End: 2022-12-01

## 2022-12-06 ENCOUNTER — TELEPHONE (OUTPATIENT)
Dept: PHYSICAL THERAPY | Age: 52
End: 2022-12-06

## 2022-12-07 ENCOUNTER — APPOINTMENT (OUTPATIENT)
Dept: PHYSICAL THERAPY | Age: 52
End: 2022-12-07

## 2023-03-15 ENCOUNTER — HOSPITAL ENCOUNTER (OUTPATIENT)
Facility: HOSPITAL | Age: 53
Setting detail: RECURRING SERIES
Discharge: HOME | End: 2023-03-18
Payer: MEDICAID

## 2023-03-15 PROCEDURE — 97110 THERAPEUTIC EXERCISES: CPT

## 2023-03-15 PROCEDURE — 97530 THERAPEUTIC ACTIVITIES: CPT

## 2023-03-15 PROCEDURE — 97162 PT EVAL MOD COMPLEX 30 MIN: CPT

## 2023-03-15 NOTE — PROGRESS NOTES
[facility] PHYSICAL THERAPY  [address] [facility] NR:[phone] Fx: [phone]  Plan of Care / Statement of Necessity for Physical Therapy Services     Patient Name: Dianne Barron : 1970   Medical   Diagnosis: Left and Right Knee Pain Treatment Diagnosis: Osteoarthritis of knee, unspecified [M17.9]  Pain in unspecified joint [M25.50]   Onset Date:      Referral Source: Eric Snider* Start of Care [facility]): 3/15/2023   Prior Hospitalization: See medical history Provider #: [id number]   Prior Level of Function: Limited Ambulation function ,Limited Activity tolerance. Pt reports she used to Run and work out at [employer] . Pt has a shower chair, elevated Commode chair. Comorbidities: Fibromyalgia, Depression, Arthritis. Medications: Verified on Patient Summary List     Assessment / key information:   Patient is a 48-year-old female who presents to therapy with chief complaint of bilateral knee pain, right > Left, beginning 4 years ago. Patient reports she had a recent fall last month,  due to her knees giving out. Pain today =9/10  Patient reports she was referred to rheumatologist and was told she had OA. She reports her knees will give out and cause her to fall. Symptoms localized to anterior knee; symptom described as sharp and a \"constant pressure\"  at the anterior and surrounding tendons and jt line. Symptoms aggravated with sitting, standing, and walking; Symptoms reduced with medication. Patient reports she also has ongoing back pain/sciatica. She has been unable to work due to pain. Exam reveals patient with difficulty rising up from a seated position and difficulty with Ambulation. She demonstrates decreased B LE strength (pain limiting tolerance to testing). She presents with edema to right knee. KT taping was done to help with support, decrease pain and improve stability.   Pt used a SBQC for Trial at [facility] and had decreased pressure/pain to her knees. Patient would benefit from skilled outpatient PT to address above mentioned deficits to return to prior level of function, increase independence with ADLs, and improve overall quality of life. Evaluation Complexity:  History:  MEDIUM  Complexity : 1-2 comorbidities / personal factors will impact the outcome/ POC ; Examination:  MEDIUM Complexity : 3 Standardized tests and measures addressin body structure, function, activity limitation and / or participation in recreation  ;Presentation:  MEDIUM Complexity : Evolving with changing characteristics  ; Clinical Decision Making:  MEDIUM Complexity : FOTO score of 26-74 FOTO score = an established functional score where 100 = no disability  Overall Complexity Rating: MEDIUM  Problem List: pain affecting function, decrease ROM, decrease strength, edema affection function, impaired gait/balance, decrease ADL/functional abilities, decrease activity tolerance, decrease flexibility/joint mobility, and decrease transfer abilities    Treatment Plan may include any combination of the followin Therapeutic Exercise, 80826 Neuromuscular Re-Education, 38507 Manual Therapy, 84133 Therapeutic Activity, 93844 Self Care/Home Management, 26299 Electrical Stim unattended, 43136 Electrical Stim attended, 00423 Vasopneumatic Device, 28104 Gait Training, and 77921 Ultrasound  Patient / Family readiness to learn indicated by: asking questions, trying to perform skills, and interest  Persons(s) to be included in education: patient (P)  Barriers to Learning/Limitations: none  Measures taken if barriers to learning present: none  Patient Goal (s): Pain Management  Patient Self Reported Health Status: poor  Rehabilitation Potential: fair    Short Term Goals: To be accomplished in 1 weeks  Goal:Pt will be compliant with a HEP to improve Function  Status at evaluation/last progress note: Issued at [address] [place] [address] Way Term Goals:  To be accomplished in 4 weeks    Goal: Pt will improve FOTO score by  26  pts to improve function  Status at evaluation/last progress note:FOTO=10     2. Goal:Pt will report pain <7/10 to ease with light ADL's  Status at evaluation/last progress note:Pain range 6 seated - 9 standing     3. Goal: Pt will tolerate standing >5 mins to perform a simple house chore or meal prep before she has to sit down. Status at evaluation/last progress note: Pt reports instant pain with any standing activity. 4.   Goal:Pt will ambulate with Appropriate AD to further decrease discomfort/pain for ease of activity tolerance and short ambulation distances. Status at evaluation/last progress note:Pt Ambulated with [employer] and had decreased sx to her knees and back. Frequency / Duration: Patient to be seen 2-3 times per week for 4 weeks    Patient/ Caregiver education and instruction: Diagnosis, prognosis, self care, activity modification, and exercises [x]  Plan of care has been reviewed with LILLIE Leos, PT       3/15/2023       10:26 AM  ===================================================================  I certify that the above Therapy Services are being furnished while the patient is under my care. I agree with the treatment plan and certify that this therapy is necessary. [de-identified] Signature:_________________________   DATE:_________   TIME:________                           [facility]*    ** Signature, Date and Time must be completed for valid certification **  Please sign and return to [facility] or you may fax the signed copy to [phone]. Thank you.

## 2023-03-15 NOTE — PROGRESS NOTES
PT DAILY TREATMENT NOTE/KNEE EVAL       Patient Name: Lucy Glass    Date: 3/15/2023    : 1970  Insurance: Payor: UNITED [facility] [facility] PL / Plan: [employer] [place] / Product Type: *No Product type* /      Patient  verified yes     Visit #   Current / Total 1 10-12   Time   In / Out 1105 1200   Pain   In / Out 9 6   Subjective Functional Status/Changes: OA of bilat knees , right >left. Pt reports falls due to buckling. Weightbearing activities =unbearable 10/10 , seated activities =6/10. No interventions. She does have LBP and thought her MD sent an order   Changes to:  Meds, Allergies, Med Hx, Sx Hx? If yes, update Summary List yes  see eval     Treatment Area: Osteoarthritis of knee, unspecified [M17.9]  Pain in unspecified joint [M25.50]    SUBJECTIVE  Pain Level (0-10 scale): 6-10/10  [x]constant []intermittent []improving [x]worsening [x]no change since onset    Any medication changes, allergies to medications, adverse drug reactions, diagnosis change, or new procedure performed?: [x] No    [] Yes (see summary sheet for update)  Subjective functional status/changes:     PLOF: Jesika Leap 2023 due to knees. Osteoarthritis in knees from rheumatologist.   LS work related injury     Limitations to PLOF: limited walking, sitting, ambulation,     Mechanism of Injury: no knee injury   Current symptoms/Complaints: knee pain  Previous Treatment/Compliance:   PMHx/Surgical Hx: no shots  Work Hx: na  Living Situation: difficult standing up, sitting walking, bending to reach off the floor. Use electric buggy.     Pt Goals: improve knee function  Barriers: []pain []financial []time []transportation []other  Motivation: yes  Substance use: []Alcohol []Tobacco []other:   FABQ Score: []low []elevate  Cognition: A & O x 4    Other:    OBJECTIVE/EXAMINATION  Domestic Life: na  Activity/Recreational Limitations: have a commode and shower chair  Mobility: improved with [facility] at [facility]  Self Care: indep    Modalities Rationale:      to improve patient's ability to progress to PLOF and address remaining functional goals. min [] Estim Unattended, type/location:                                      []  w/ice    []  w/heat    min [] Estim Attended, type/location:                                     []  w/US     []  w/ice    []  w/heat    []  TENS insruct      min []  Mechanical Traction: type/lbs                   []  pro   []  sup   []  int   []  cont    []  before manual    []  after manual    min []  Ultrasound, settings/location:      min []  Iontophoresis w/ dexamethasone, location:                                               []  take home patch       []  in clinic    min  unbilled []  Ice     []  Heat    location/position:     min []  Vasopneumatic Device, press/temp:    If using vaso (only need to measure limb vaso being performed on)      pre-treatment girth :       post-treatment girth :       measured at (landmark location) :      min []  Other:    Skin assessment post-treatment (if applicable):    []  intact    []  redness- no adverse reaction                  []redness - adverse reaction:          20 min []Eval  - untimed                   Therapeutic Procedures: Tx Min Billable or 1:1 Min (if diff from Tx Min) Procedure, Rationale, Specifics   10  07209 Therapeutic Exercise (timed):  increase ROM, strength, coordination, balance, and proprioception to improve patient's ability to progress to PLOF and address remaining functional goals. (see flow sheet as applicable)     Details if applicable:     15  91544 Therapeutic Activity (timed):  use of dynamic activities replicating functional movements to increase ROM, strength, coordination, balance, and proprioception in order to improve patient's ability to progress to PLOF and address remaining functional goals. (see flow sheet as applicable)     Details if applicable:      Total Total Reminder: MC/BC bill using total billable min of TIMED therapeutic procedures (example: do not include dry needle or estim unattended, both untimed codes, in totals to left)     [x]  Patient Education billed concurrently with other procedures     Other Objective/Functional Measures: edema to right knee    Physical Therapy Evaluation - Knee    Posture: [] Varus    [x] Valgus    [] Recurvatum        [x] Tibial Torsion    [x] Foot Supination    [] Foot Pronation    Describe:    Gait:  [] Normal    [x] Abnormal    [x] Antalgic    [] NWB    Device:    Describe:    ROM / Strength  [] Unable to assess                  AROM                      PROM                   Strength (1-5)    Left Right Left Right Left Right   Hip Flexion          Extension          Abduction          Adduction         Knee Flexion 115 117        Extension 5 10       Ankle Plantarflexion          Dorsiflexion             Flexibility: [] Unable to assess at this time  Hamstrings:    (L) Tightness= [] WNL   [] Min   [x] Mod   [] Severe    (R) Tightness= [] WNL   [] Min   [] Mod   [x] Severe  Quadriceps:    (L) Tightness= [] WNL   [] Min   [x] Mod   [] Severe    (R) Tightness= [] WNL   [] Min   [] Mod   [x] Severe  Gastroc:      (L) Tightness= [] WNL   [] Min   [x] Mod   [] Severe    (R) Tightness= [] WNL   [] Min   [] Mod   [x] Severe  Other:    Palpation:   Neg/Pos  Neg/Pos  Neg/Pos   Joint Line ++ Quad tendon + Patellar ligament ++   Patella ++ Fibular head + Pes Anserinus    Tibial tubercle + Hamstring tendons + Infrapatellar fat pad ++     Optional Tests:  Patellar Positioning (Static)   []L []R Normal []L []R Lateral   []L []R [place]      []L []R Medial   []L []R [place]    Patellar Tracking   []L []R Glide (Lat)   []L []R Tilt (Lat)     []L []R Glide (Med)  []L []R Tilt (Med)      []L []R Tile (Inf)     Patellar Mobility   []L []R Hypermobile []L []R Hypomobile         Girth Measurements:     Cm at  Cm above joint line   Cm at   Cm below joint line  Cm at joint line   Left        Right Lachmans  [] Neg    [] Pos Posterior Drawer [] Neg    [] Pos  Pivot Shift  [] Neg    [] Pos Posterior Sag  [] Neg    [] Pos  MIGUEL   [] Neg    [] Pos Clayton's Test [] Neg    [] Pos  ALRI   [] Neg    [] Pos Squat   [] Neg    [] Pos  Valgus@ 0 Degrees [] Neg    [] Pos Chintan-Scot [] Neg    [] Pos  Valgus@ 30 Degrees [] Neg    [] Pos Patellar Apprehension [] Neg    [] Pos  Varus@ 0 Degrees [] Neg    [] Pos Sparks's Compression [] Neg    [] Pos  Varus@ 30 Degrees [] Neg    [] Pos Ely's Test  [] Neg    [] Pos  Apley's Compression [] Neg    [] Pos Fede's Test  [] Neg    [] Pos  Apley's Distraction [] Neg    [] Pos Stroke Test  [] Neg    [] Pos   Anterior Drawer [] Neg    [] Pos Fluctuation Test [] Neg    [] Pos  Other:                  [] Neg    [] Pos                 Other tests/comments:       Pain Level (0-10 scale) post treatment: 6/10    ASSESSMENT/Changes in Function: NA    Patient will continue to benefit from skilled PT services to modify and progress therapeutic interventions, analyze and address functional mobility deficits, analyze and address ROM deficits, analyze and address strength deficits, analyze and address soft tissue restrictions, analyze and cue for proper movement patterns, analyze and modify for postural abnormalities, analyze and address imbalance/dizziness, and instruct in home and community integration to address functional deficits and attain remaining goals.        [x]  See Plan of Care for goals and reassessment       PLAN  [x]  Upgrade activities as tolerated     [x]  Continue plan of care  []  Update interventions per flow sheet       []  Other:_      Cornell Sommers, PT 3/15/2023  10:26 AM

## 2023-03-21 ENCOUNTER — HOSPITAL ENCOUNTER (OUTPATIENT)
Facility: HOSPITAL | Age: 53
Setting detail: RECURRING SERIES
Discharge: HOME | End: 2023-03-24
Payer: MEDICAID

## 2023-03-21 PROCEDURE — 97112 NEUROMUSCULAR REEDUCATION: CPT

## 2023-03-21 PROCEDURE — 97110 THERAPEUTIC EXERCISES: CPT

## 2023-03-21 NOTE — PROGRESS NOTES
deficits, analyze and address ROM deficits, analyze and address strength deficits, analyze and address soft tissue restrictions, analyze and cue for proper movement patterns, analyze and modify for postural abnormalities, analyze and address imbalance/dizziness, and instruct in home and community integration to address functional deficits and attain remaining goals. Progress toward goals / Updated goals:  []  See Progress Note/Recertification    Short Term Goals: To be accomplished in 1 weeks  Goal:Pt will be compliant with a HEP to improve Function  Status at evaluation/last progress note: Issued at [address] Term Goals: To be accomplished in 4 weeks     Goal: Pt will improve FOTO score by  26  pts to improve function  Status at evaluation/last progress note:FOTO=10     2. Goal:Pt will report pain <7/10 to ease with light ADL's  Status at evaluation/last progress note:Pain range 6 seated - 9 standing     3. Goal: Pt will tolerate standing >5 mins to perform a simple house chore or meal prep before she has to sit down. Status at evaluation/last progress note: Pt reports instant pain with any standing activity. 4.   Goal:Pt will ambulate with Appropriate AD to further decrease discomfort/pain for ease of activity tolerance and short ambulation distances. Status at evaluation/last progress note:[facility] and had decreased sx to her knees and back.      PLAN  Yes  Continue plan of care  []  Upgrade activities as tolerated  []  Discharge due to :  []  Other:    Gavin Kruger PT    3/21/2023    1:42 PM    Future Appointments   Date Time Provider Rosie Sparks   3/22/2023  1:30 PM Gavin Kruger PT [id number]   3/24/2023 12:00 PM Nasir Wilson MD CAP BS AMB   3/27/2023 12:00 PM Gavin Kruger PT [id number] [facility]   3/29/2023  1:30 PM Gavin Kruger PT [id number] [facility]   4/5/2023  2:00 PM Gavin Kruger PT [id number]   4/6/2023 10:00 AM Elisha Rai, PT [id number] [facility]   4/10/2023  1:00 PM [facility]

## 2023-03-22 ENCOUNTER — APPOINTMENT (OUTPATIENT)
Facility: HOSPITAL | Age: 53
End: 2023-03-22
Payer: MEDICAID

## 2023-03-22 NOTE — PROGRESS NOTES
HISTORY OF PRESENT ILLNESS  Phil Kohler is a 47 y.o. female. PMH Anxiety, Tobacco Abuse, is presenting establish care  ----  CARDIAC STUDIES  ----  EKG 2022  Normal sinus rhythm  ----  No LHC  No echo  No stress test  No heart monitor  ----  Patient reports that sometimes tightness sometimes a sharp pain, comes about one time per week, states reports some shortness of breath as well. Reports that substernal can last for 30 minutes, states that pain is relieved by tincture of time. Spontaneously dissipates. Family History   Problem Relation Age of Onset    Hypertension Mother     Heart Attack Mother 61    Cancer Mother     Coronary Art Dis Mother     Hypertension Other     Psychiatric Disorder Other     Cancer Other     Heart Disease Other     Stroke Other     Diabetes Other        Past Medical History:   Diagnosis Date    Anxiety     Cigarette smoker     Dyspnea on exertion 2011    Negative cardiac and pulmonary workup    Heart murmur     Heart murmur     History of gestational diabetes     Musculoskeletal disorder     DDD    Neurological disorder     migraines    Ovarian cyst        Past Surgical History:   Procedure Laterality Date     SECTION      COLONOSCOPY N/A 2021    COLONOSCOPY performed by Zaria Jessica MD at [address] History     Tobacco Use    Smoking status: Former     Packs/day: 1.00     Types: Cigarettes     Quit date: 2013     Years since quitting: 10.1    Smokeless tobacco: Current    Tobacco comments:     Quit smoking: vaping   Substance Use Topics    Alcohol use: No       Not on File    Prior to Admission medications    Medication Sig Start Date End Date Taking?  Authorizing Provider   ascorbic acid (VITAMIN C) 500 MG tablet Take by mouth    Ar Automatic Reconciliation   cyanocobalamin 500 MCG tablet Take 500 mcg by mouth daily    Ar Automatic Reconciliation   cyclobenzaprine (FLEXERIL) 5 MG tablet Take 10 mg by mouth

## 2023-03-24 ENCOUNTER — OFFICE VISIT (OUTPATIENT)
Age: 53
End: 2023-03-24
Payer: MEDICAID

## 2023-03-24 ENCOUNTER — HOSPITAL ENCOUNTER (OUTPATIENT)
Facility: HOSPITAL | Age: 53
Setting detail: RECURRING SERIES
Discharge: HOME | End: 2023-03-27
Payer: MEDICAID

## 2023-03-24 VITALS
OXYGEN SATURATION: 98 % | WEIGHT: 161 LBS | SYSTOLIC BLOOD PRESSURE: 105 MMHG | BODY MASS INDEX: 27.49 KG/M2 | HEIGHT: 64 IN | DIASTOLIC BLOOD PRESSURE: 66 MMHG | HEART RATE: 74 BPM

## 2023-03-24 DIAGNOSIS — R06.09 DYSPNEA ON EXERTION: ICD-10-CM

## 2023-03-24 DIAGNOSIS — R07.89 OTHER CHEST PAIN: Primary | ICD-10-CM

## 2023-03-24 PROCEDURE — 93000 ELECTROCARDIOGRAM COMPLETE: CPT | Performed by: INTERNAL MEDICINE

## 2023-03-24 PROCEDURE — 97112 NEUROMUSCULAR REEDUCATION: CPT

## 2023-03-24 PROCEDURE — 99204 OFFICE O/P NEW MOD 45 MIN: CPT | Performed by: INTERNAL MEDICINE

## 2023-03-24 PROCEDURE — 97110 THERAPEUTIC EXERCISES: CPT

## 2023-03-24 RX ORDER — METHOCARBAMOL 500 MG/1
TABLET, FILM COATED ORAL
COMMUNITY
Start: 2023-03-01

## 2023-03-24 RX ORDER — BUSPIRONE HYDROCHLORIDE 10 MG/1
TABLET ORAL
COMMUNITY
Start: 2023-02-12

## 2023-03-24 RX ORDER — RIZATRIPTAN BENZOATE 5 MG/1
TABLET, ORALLY DISINTEGRATING ORAL
COMMUNITY
Start: 2023-03-01

## 2023-03-24 RX ORDER — QUETIAPINE FUMARATE 300 MG/1
300 TABLET, FILM COATED ORAL 2 TIMES DAILY
COMMUNITY

## 2023-03-24 RX ORDER — GABAPENTIN 100 MG/1
100 CAPSULE ORAL 3 TIMES DAILY
COMMUNITY

## 2023-03-24 ASSESSMENT — ENCOUNTER SYMPTOMS
DIARRHEA: 0
BLOOD IN STOOL: 0
APNEA: 0
WHEEZING: 0
CONSTIPATION: 0
NAUSEA: 0
COLOR CHANGE: 0
COUGH: 0
CHEST TIGHTNESS: 1
SHORTNESS OF BREATH: 1
ABDOMINAL PAIN: 0

## 2023-03-24 NOTE — PROGRESS NOTES
PHYSICAL / OCCUPATIONAL THERAPY - DAILY TREATMENT NOTE (updated )    Patient Name: Erin Gr    Date: 3/24/2023    : 1970  Insurance: Payor: [facility] PL / Plan: [employer] [facility] [employer] 4.0 [place] / Product Type: *No Product type* /      Patient  verified Yes     Visit #   Current / Total 3 12   Time   In / Out 10:32 11:00   Pain   In / Out 9/10 8/10   Subjective Functional Status/Changes: Pt. Reports she still having a lot of knee pain and her sciatic pain is worse today at 10/10. She is still having episodes of sharp in knees making her knees give out. She is still waiting on getting her cane. Changes to:  Meds, Allergies, Med Hx, Sx Hx? If yes, update Summary List no       TREATMENT AREA =  Knee pain [M25.569]    OBJECTIVE    Therapeutic Procedures: Tx Min Billable or 1:1 Min (if diff from Tx Min) Procedure, Rationale, Specifics   8  95600 Neuromuscular Re-Education (timed):  improve balance, coordination, kinesthetic sense, posture, core stability and proprioception to improve patient's ability to develop conscious control of individual muscles and awareness of position of extremities in order to progress to PLOF and address remaining functional goals. (see flow sheet as applicable)     Details if applicable:  cuing to improve quad contraction during quad sets and SLR     20  98920 Therapeutic Exercise (timed):  increase ROM, strength, coordination, balance, and proprioception to improve patient's ability to progress to PLOF and address remaining functional goals.  (see flow sheet as applicable)     Details if applicable:  see flow sheet          Details if applicable:            Details if applicable:            Details if applicable:     29  [facility] Totals Reminder: bill using total billable min of TIMED therapeutic procedures (example: do not include dry needle or estim unattended, both untimed codes, in totals to left)  8-22 min = 1 unit; 23-37 min = 2 units; 38-52

## 2023-03-24 NOTE — PATIENT INSTRUCTIONS
Learning About the [facility] Diet  What is the Mediterranean diet? The Mediterranean diet is a style of eating rather than a diet plan. It features foods eaten in [place], [place], [place] and [place], and other countries along the [facility]. It emphasizes eating foods like fish, fruits, vegetables, beans, high-fiber breads and whole grains, nuts, and olive oil. This style of eating includes limited red meat, cheese, and sweets. Why choose the Mediterranean diet? A Mediterranean-style diet may improve heart health. It contains more fat than other heart-healthy diets. But the fats are mainly from nuts, unsaturated oils (such as fish oils and olive oil), and certain nut or seed oils (such as canola, soybean, or flaxseed oil). These fats may help protect the heart and blood vessels. How can you get started on the Mediterranean diet? Here are some things you can do to switch to a more Mediterranean way of eating. What to eat  Eat a variety of fruits and vegetables each day, such as grapes, blueberries, tomatoes, broccoli, peppers, figs, olives, spinach, eggplant, beans, lentils, and chickpeas. Eat a variety of whole-grain foods each day, such as oats, brown rice, and whole wheat bread, pasta, and couscous. Eat fish at least 2 times a week. Try tuna, salmon, mackerel, lake trout, herring, or sardines. Eat moderate amounts of low-fat dairy products, such as milk, cheese, or yogurt. Eat moderate amounts of poultry and eggs. Choose healthy (unsaturated) fats, such as nuts, olive oil, and certain nut or seed oils like canola, soybean, and flaxseed. Limit unhealthy (saturated) fats, such as butter, palm oil, and coconut oil. And limit fats found in animal products, such as meat and dairy products made with whole milk. Try to eat red meat only a few times a month in very small amounts. Limit sweets and desserts to only a few times a week. This includes sugar-sweetened drinks like soda.   The

## 2023-03-24 NOTE — PROGRESS NOTES
Have you had PND? No if so how long  How bad     2. Have you had Fatigue? Yes  patient states vit d is low    3. Have you had Dyspnea?  if so how long  how bad     4. Have you had Orthopnea?  if so how long  how bad     5. Have you had Palpitations?  if so how long  how frequent      how bad     6. Have you had Syncope?  if so how long  how frequent  how     bad     7. Have you had Chest Pain? Yes if so how long 30 minutes how frequent once a week . Is     it  substernal?  Pressure like? Sharp pain  Comes on with Activity or with large meals? Is it     relieved by rest or NTG? how bad 10    8. Have you had any weight gain?  if so how long  how bad      9.  Have you had any swelling?  if so how long  how frequent  how     bad

## 2023-03-27 ENCOUNTER — APPOINTMENT (OUTPATIENT)
Facility: HOSPITAL | Age: 53
End: 2023-03-27
Payer: MEDICAID

## 2023-03-29 ENCOUNTER — HOSPITAL ENCOUNTER (OUTPATIENT)
Facility: HOSPITAL | Age: 53
Setting detail: RECURRING SERIES
Discharge: HOME | End: 2023-04-01
Payer: MEDICAID

## 2023-03-29 PROCEDURE — 97110 THERAPEUTIC EXERCISES: CPT

## 2023-03-29 PROCEDURE — 97530 THERAPEUTIC ACTIVITIES: CPT

## 2023-03-29 NOTE — PROGRESS NOTES
PHYSICAL / OCCUPATIONAL THERAPY - DAILY TREATMENT NOTE (updated )    Patient Name: Vladimir Sepulveda    Date: 3/29/2023    : 1970  Insurance: Payor: [facility] / Plan: [employer] [facility] [employer] 4.0 [place] / Product Type: *No Product type* /      Patient  verified Yes     Visit #   Current / Total *** ***   Time   In / Out *** ***   Pain   In / Out *** ***   Subjective Functional Status/Changes: ***   Changes to:  Meds, Allergies, Med Hx, Sx Hx? If yes, update Summary List {YES/NO DIET:[id number]}       TREATMENT AREA =  Knee pain [M25.569]    OBJECTIVE    {[employer] Modality QFCF:08059}     Therapeutic Procedures: Tx Min Billable or 1:1 Min (if diff from Tx Min) Procedure, Rationale, Specifics     {[employer] Ther Procedures:07564}     Details if applicable:         {[employer] Ther Procedures:40454}     Details if applicable:       [facility] Totals Reminder: bill using total billable min of TIMED therapeutic procedures (example: do not include dry needle or estim unattended, both untimed codes, in totals to left)  8-22 min = 1 unit; 23-37 min = 2 units; 38-52 min = 3 units; 53-67 min = 4 units; 68-82 min = 5 units   Total Total     [x]  Patient Education billed concurrently with other procedures   [x] Review HEP    [] Progressed/Changed HEP, detail:    [] Other detail:       Objective Information/Functional Measures/Assessment    ***    Patient will continue to benefit from skilled PT / OT services to {[employer] Sk[employer] Services:86400} to address functional deficits and attain remaining goals.     Progress toward goals / Updated goals:  []  See Progress Note/Recertification    ***    PLAN  {YES/NO:88697}  Continue plan of care  []  Upgrade activities as tolerated  []  Discharge due to :  []  Other:    Bro Delgado PT    3/29/2023    4:26 PM    Future Appointments   Date Time Provider Rosie Sparks   2023  2:00 PM Bro Delgado PT [id number]   2023 10:00 AM Bro Delgado PT [id number]

## 2023-03-30 ENCOUNTER — APPOINTMENT (OUTPATIENT)
Facility: HOSPITAL | Age: 53
End: 2023-03-30
Payer: MEDICAID

## 2023-04-05 ENCOUNTER — HOSPITAL ENCOUNTER (OUTPATIENT)
Facility: HOSPITAL | Age: 53
Setting detail: RECURRING SERIES
Discharge: HOME | End: 2023-04-08
Payer: MEDICAID

## 2023-04-05 PROCEDURE — 97110 THERAPEUTIC EXERCISES: CPT

## 2023-04-05 PROCEDURE — 97530 THERAPEUTIC ACTIVITIES: CPT

## 2023-04-05 NOTE — PROGRESS NOTES
PHYSICAL / OCCUPATIONAL THERAPY - DAILY TREATMENT NOTE (updated )    Patient Name: Caro Polanco    Date: 2023    : 1970  Insurance: Payor: [facility] / Plan: [employer] 4.0 [place] / Product Type: *No Product type* /      Patient  verified Yes     Visit #   Current / Total 5 8-12   Time   In / Out 202 230   Pain   In / Out 8 8   Subjective Functional Status/Changes: Frustrated for not having the script for her back yet. She was told it will be sent here. Reports the pain is at the knee caps. Changes to:  Meds, Allergies, Med Hx, Sx Hx? If yes, update Summary List no       TREATMENT AREA =  Knee pain [M25.569]    OBJECTIVE         Therapeutic Procedures: Tx Min Billable or 1:1 Min (if diff from Tx Min) Procedure, Rationale, Specifics   15  48869 Therapeutic Exercise (timed):  increase ROM, strength, coordination, balance, and proprioception to improve patient's ability to progress to PLOF and address remaining functional goals. (see flow sheet as applicable)     Details if applicable:       13  25399 Therapeutic Activity (timed):  use of dynamic activities replicating functional movements to increase ROM, strength, coordination, balance, and proprioception in order to improve patient's ability to progress to PLOF and address remaining functional goals.   (see flow sheet as applicable)     Details if applicable:     29  [facility] Totals Reminder: bill using total billable min of TIMED therapeutic procedures (example: do not include dry needle or estim unattended, both untimed codes, in totals to left)  8-22 min = 1 unit; 23-37 min = 2 units; 38-52 min = 3 units; 53-67 min = 4 units; 68-82 min = 5 units   Total Total     [x]  Patient Education billed concurrently with other procedures   [x] Review HEP    [] Progressed/Changed HEP, detail:    [] Other detail:       Objective Information/Functional Measures/Assessment  Pt continues to ambulate with a crouched

## 2023-04-12 ENCOUNTER — HOSPITAL ENCOUNTER (OUTPATIENT)
Facility: HOSPITAL | Age: 53
Setting detail: RECURRING SERIES
End: 2023-04-12
Payer: MEDICAID

## 2023-04-12 ENCOUNTER — APPOINTMENT (OUTPATIENT)
Facility: HOSPITAL | Age: 53
End: 2023-04-12
Payer: MEDICAID

## 2023-04-13 ENCOUNTER — APPOINTMENT (OUTPATIENT)
Facility: HOSPITAL | Age: 53
End: 2023-04-13
Payer: MEDICAID

## 2023-04-14 ENCOUNTER — APPOINTMENT (OUTPATIENT)
Facility: HOSPITAL | Age: 53
End: 2023-04-14
Payer: MEDICAID

## 2023-07-05 ENCOUNTER — TRANSCRIBE ORDERS (OUTPATIENT)
Facility: HOSPITAL | Age: 53
End: 2023-07-05

## 2023-07-05 DIAGNOSIS — Z12.31 SCREENING MAMMOGRAM FOR HIGH-RISK PATIENT: Primary | ICD-10-CM

## 2023-07-07 ENCOUNTER — HOSPITAL ENCOUNTER (OUTPATIENT)
Facility: HOSPITAL | Age: 53
Setting detail: RECURRING SERIES
End: 2023-07-07
Payer: MEDICARE

## 2023-07-07 ENCOUNTER — HOSPITAL ENCOUNTER (OUTPATIENT)
Facility: HOSPITAL | Age: 53
End: 2023-07-07
Payer: MEDICARE

## 2023-07-07 DIAGNOSIS — Z12.31 SCREENING MAMMOGRAM FOR HIGH-RISK PATIENT: ICD-10-CM

## 2023-07-07 PROCEDURE — 77063 BREAST TOMOSYNTHESIS BI: CPT

## 2023-07-24 ENCOUNTER — APPOINTMENT (OUTPATIENT)
Facility: HOSPITAL | Age: 53
End: 2023-07-24
Payer: MEDICARE

## 2023-07-26 ENCOUNTER — HOSPITAL ENCOUNTER (OUTPATIENT)
Facility: HOSPITAL | Age: 53
Setting detail: RECURRING SERIES
Discharge: HOME | End: 2023-07-29
Payer: MEDICARE

## 2023-07-26 PROCEDURE — 97110 THERAPEUTIC EXERCISES: CPT

## 2023-07-26 PROCEDURE — 97162 PT EVAL MOD COMPLEX 30 MIN: CPT

## 2023-07-26 NOTE — PROGRESS NOTES
Updated goals:  []  See Progress Note/Recertification  Short Term Goals: To be accomplished in 2 weeks  The patient will demonstrate independence and compliance with HEP to maximize therapeutic benefit. IE: issued HEP  The patient will improve cervical rotation to 65 degrees B to improve ease of driving. IE: 35 degrees rotation right  Long Term Goals: To be accomplished in 12 weeks  The patient will improve FOTO score to 44 to improve quality of life. IE: 32  The patient will improve hip ABD strength to 4/5 MMT to improve stability in stance. IE: 3+/5 MMT B  The patient will improve lumbar rotation to WNL to improve ease of car transfers. IE: 75% of WNL  The patient will report no greater than 5/10 pain in order to improve quality of life.    IE: 10/10    PLAN  No  Continue plan of care  []  Upgrade activities as tolerated  []  Discharge due to :  []  Other:    Dorinda Arvizu, PT    7/26/2023    12:20 PM    Future Appointments   Date Time Provider [address]   7/28/2023  9:50 AM Ashley Patel, PT [facility]   8/2/2023  9:50 AM Dorinda Arvizu, PT [facility] [place]   8/4/2023  9:50 AM Sade Correa, PT [facility] [place]   8/7/2023 11:10 AM Paolo Land, PTA [facility] [place]   8/10/2023  9:50 AM Paolo Land, PTA [facility] [place]   8/14/2023  2:30 PM Dorinda Arvizu, PT [facility] [place]   8/16/2023 12:30 PM Dorinda Arvizu, PT [facility] [place]   10/27/2023  7:45 AM Yovani Donald MD CAP BS AMB
Caregiver education and instruction: Diagnosis, prognosis, self care, activity modification, and exercises [x]  Plan of care has been reviewed with PTA    Certification Period: 7/26/2023 - 10/24/2023    Jermain Perez, PT       7/26/2023       8:48 AM    I certify that the above Therapy Services are being furnished while the patient is under my care. I agree with the treatment plan and certify that this therapy is necessary. Physician's Signature:_________________________   DATE:_________   TIME:________                           Princess Michaeler*  Insurance: Payor: MEDICARE / Plan: MEDICARE PART A AND B / Product Type: *No Product type* /      ** Signature, Date and Time must be completed for valid certification **  Please sign and [facility] or you may fax the signed copy to [phone]. Thank you.

## 2023-07-28 ENCOUNTER — HOSPITAL ENCOUNTER (OUTPATIENT)
Facility: HOSPITAL | Age: 53
Setting detail: RECURRING SERIES
Discharge: HOME | End: 2023-07-31
Payer: MEDICARE

## 2023-07-28 PROCEDURE — 97110 THERAPEUTIC EXERCISES: CPT

## 2023-07-28 PROCEDURE — G0283 ELEC STIM OTHER THAN WOUND: HCPCS

## 2023-07-28 PROCEDURE — 97112 NEUROMUSCULAR REEDUCATION: CPT

## 2023-07-28 NOTE — PROGRESS NOTES
PHYSICAL / OCCUPATIONAL THERAPY - DAILY TREATMENT NOTE (updated )    Patient Name: Junie Ames    Date: 2023    : 1970  Insurance: Payor: MEDICARE / Plan: MEDICARE PART A AND B / Product Type: *No Product type* /      Patient  verified Yes     Visit #   Current / Total 2 24   Time   In / Out 9:53 10:45   Pain   In / Out 10/10 8/10   Subjective Functional Status/Changes: The patient arrives in a lot of pain through her back and indicates, \"she almost didn't come today\". Changes to: Allergies, Med Hx, Sx Hx?   no       TREATMENT AREA =  Other low back pain [M54.59]    OBJECTIVE  Modalities Rationale:     decrease pain and increase tissue extensibility to improve patient's ability to progress to PLOF and address remaining functional goals. 10 min [x] Estim Unattended, type/location:  LBP IFC with patient seated towel roll at lumbar spine                                     []  w/ice    [x]  w/heat   Skin assessment post-treatment (if applicable):    []  intact    []  redness- no adverse reaction                 []redness - adverse reaction:         Therapeutic Procedures: Tx Min Billable or 1:1 Min (if diff from Tx Min) Procedure, Rationale, Specifics   32  44873 Therapeutic Exercise (timed):  increase ROM, strength, coordination, balance, and proprioception to improve patient's ability to progress to PLOF and address remaining functional goals. (see flow sheet as applicable)     Details if applicable:       10  01811 Neuromuscular Re-Education (timed):  improve balance, coordination, kinesthetic sense, posture, core stability and proprioception to improve patient's ability to develop conscious control of individual muscles and awareness of position of extremities in order to progress to PLOF and address remaining functional goals.  (see flow sheet as applicable)     Details if applicable:       43  [facility] Totals Reminder: bill using total billable min of TIMED therapeutic procedures

## 2023-08-02 ENCOUNTER — APPOINTMENT (OUTPATIENT)
Facility: HOSPITAL | Age: 53
End: 2023-08-02
Payer: MEDICARE

## 2023-08-02 ENCOUNTER — HOSPITAL ENCOUNTER (OUTPATIENT)
Facility: HOSPITAL | Age: 53
Setting detail: RECURRING SERIES
Discharge: HOME | End: 2023-08-05
Payer: MEDICARE

## 2023-08-02 ENCOUNTER — HOSPITAL ENCOUNTER (EMERGENCY)
Facility: HOSPITAL | Age: 53
Discharge: HOME | End: 2023-08-02
Attending: STUDENT IN AN ORGANIZED HEALTH CARE EDUCATION/TRAINING PROGRAM
Payer: MEDICARE

## 2023-08-02 VITALS
OXYGEN SATURATION: 100 % | RESPIRATION RATE: 20 BRPM | HEIGHT: 61 IN | WEIGHT: 170 LBS | HEART RATE: 71 BPM | TEMPERATURE: 98.8 F | BODY MASS INDEX: 32.1 KG/M2 | SYSTOLIC BLOOD PRESSURE: 143 MMHG | DIASTOLIC BLOOD PRESSURE: 73 MMHG

## 2023-08-02 DIAGNOSIS — M54.12 CERVICAL RADICULOPATHY: Primary | ICD-10-CM

## 2023-08-02 DIAGNOSIS — M79.602 LEFT ARM PAIN: ICD-10-CM

## 2023-08-02 DIAGNOSIS — R07.9 CHEST PAIN, UNSPECIFIED TYPE: ICD-10-CM

## 2023-08-02 DIAGNOSIS — M79.601 RIGHT ARM PAIN: ICD-10-CM

## 2023-08-02 LAB
ALBUMIN SERPL-MCNC: 3.4 G/DL (ref 3.4–5)
ALBUMIN/GLOB SERPL: 0.8 (ref 0.8–1.7)
ALP SERPL-CCNC: 160 U/L (ref 45–117)
ALT SERPL-CCNC: 23 U/L (ref 13–56)
ANION GAP SERPL CALC-SCNC: 5 MMOL/L (ref 3–18)
AST SERPL-CCNC: 18 U/L (ref 10–38)
BASOPHILS # BLD: 0 K/UL (ref 0–0.1)
BASOPHILS NFR BLD: 1 % (ref 0–2)
BILIRUB SERPL-MCNC: 0.3 MG/DL (ref 0.2–1)
BUN SERPL-MCNC: 11 MG/DL (ref 7–18)
BUN/CREAT SERPL: 15 (ref 12–20)
CALCIUM SERPL-MCNC: 9 MG/DL (ref 8.5–10.1)
CHLORIDE SERPL-SCNC: 108 MMOL/L (ref 100–111)
CO2 SERPL-SCNC: 27 MMOL/L (ref 21–32)
CREAT SERPL-MCNC: 0.75 MG/DL (ref 0.6–1.3)
DIFFERENTIAL METHOD BLD: ABNORMAL
EOSINOPHIL # BLD: 0.1 K/UL (ref 0–0.4)
EOSINOPHIL NFR BLD: 1 % (ref 0–5)
ERYTHROCYTE [DISTWIDTH] IN BLOOD BY AUTOMATED COUNT: 15.6 % (ref 11.6–14.5)
GLOBULIN SER CALC-MCNC: 4.3 G/DL (ref 2–4)
GLUCOSE SERPL-MCNC: 113 MG/DL (ref 74–99)
HCT VFR BLD AUTO: 35.5 % (ref 35–45)
HGB BLD-MCNC: 10.9 G/DL (ref 12–16)
IMM GRANULOCYTES # BLD AUTO: 0 K/UL (ref 0–0.04)
IMM GRANULOCYTES NFR BLD AUTO: 0 % (ref 0–0.5)
LYMPHOCYTES # BLD: 1.9 K/UL (ref 0.9–3.6)
LYMPHOCYTES NFR BLD: 28 % (ref 21–52)
MAGNESIUM SERPL-MCNC: 1.9 MG/DL (ref 1.6–2.6)
MCH RBC QN AUTO: 24.8 PG (ref 24–34)
MCHC RBC AUTO-ENTMCNC: 30.7 G/DL (ref 31–37)
MCV RBC AUTO: 80.9 FL (ref 78–100)
MONOCYTES # BLD: 0.4 K/UL (ref 0.05–1.2)
MONOCYTES NFR BLD: 6 % (ref 3–10)
NEUTS SEG # BLD: 4.2 K/UL (ref 1.8–8)
NEUTS SEG NFR BLD: 64 % (ref 40–73)
NRBC # BLD: 0 K/UL (ref 0–0.01)
NRBC BLD-RTO: 0 PER 100 WBC
PLATELET # BLD AUTO: 310 K/UL (ref 135–420)
PMV BLD AUTO: 10.5 FL (ref 9.2–11.8)
POTASSIUM SERPL-SCNC: 3.9 MMOL/L (ref 3.5–5.5)
PROT SERPL-MCNC: 7.7 G/DL (ref 6.4–8.2)
RBC # BLD AUTO: 4.39 M/UL (ref 4.2–5.3)
SODIUM SERPL-SCNC: 140 MMOL/L (ref 136–145)
TROPONIN I SERPL HS-MCNC: 3 NG/L (ref 0–54)
TROPONIN I SERPL HS-MCNC: <3 NG/L (ref 0–54)
WBC # BLD AUTO: 6.6 K/UL (ref 4.6–13.2)

## 2023-08-02 PROCEDURE — 6360000002 HC RX W HCPCS

## 2023-08-02 PROCEDURE — 97110 THERAPEUTIC EXERCISES: CPT

## 2023-08-02 PROCEDURE — G0283 ELEC STIM OTHER THAN WOUND: HCPCS

## 2023-08-02 PROCEDURE — 71046 X-RAY EXAM CHEST 2 VIEWS: CPT

## 2023-08-02 PROCEDURE — 85025 COMPLETE CBC W/AUTO DIFF WBC: CPT

## 2023-08-02 PROCEDURE — 83735 ASSAY OF MAGNESIUM: CPT

## 2023-08-02 PROCEDURE — 73030 X-RAY EXAM OF SHOULDER: CPT

## 2023-08-02 PROCEDURE — 72040 X-RAY EXAM NECK SPINE 2-3 VW: CPT

## 2023-08-02 PROCEDURE — 99285 EMERGENCY DEPT VISIT HI MDM: CPT

## 2023-08-02 PROCEDURE — 80053 COMPREHEN METABOLIC PANEL: CPT

## 2023-08-02 PROCEDURE — 96372 THER/PROPH/DIAG INJ SC/IM: CPT

## 2023-08-02 PROCEDURE — 97112 NEUROMUSCULAR REEDUCATION: CPT

## 2023-08-02 PROCEDURE — 93005 ELECTROCARDIOGRAM TRACING: CPT | Performed by: STUDENT IN AN ORGANIZED HEALTH CARE EDUCATION/TRAINING PROGRAM

## 2023-08-02 PROCEDURE — 84484 ASSAY OF TROPONIN QUANT: CPT

## 2023-08-02 RX ORDER — NAPROXEN 500 MG/1
500 TABLET ORAL 2 TIMES DAILY WITH MEALS
Qty: 30 TABLET | Refills: 0 | Status: SHIPPED | OUTPATIENT
Start: 2023-08-02

## 2023-08-02 RX ORDER — KETOROLAC TROMETHAMINE 15 MG/ML
15 INJECTION, SOLUTION INTRAMUSCULAR; INTRAVENOUS
Status: COMPLETED | OUTPATIENT
Start: 2023-08-02 | End: 2023-08-02

## 2023-08-02 RX ORDER — PREDNISONE 10 MG/1
TABLET ORAL
Qty: 21 EACH | Refills: 1 | Status: SHIPPED | OUTPATIENT
Start: 2023-08-02 | End: 2023-08-02 | Stop reason: SDUPTHER

## 2023-08-02 RX ORDER — PREDNISONE 10 MG/1
TABLET ORAL
Qty: 21 EACH | Refills: 1 | Status: SHIPPED | OUTPATIENT
Start: 2023-08-02

## 2023-08-02 RX ORDER — NAPROXEN 500 MG/1
500 TABLET ORAL 2 TIMES DAILY WITH MEALS
Qty: 30 TABLET | Refills: 0 | Status: SHIPPED | OUTPATIENT
Start: 2023-08-02 | End: 2023-08-02 | Stop reason: SDUPTHER

## 2023-08-02 RX ADMIN — KETOROLAC TROMETHAMINE 15 MG: 15 INJECTION, SOLUTION INTRAMUSCULAR; INTRAVENOUS at 13:01

## 2023-08-02 ASSESSMENT — LIFESTYLE VARIABLES
HOW MANY STANDARD DRINKS CONTAINING ALCOHOL DO YOU HAVE ON A TYPICAL DAY: PATIENT DOES NOT DRINK
HOW OFTEN DO YOU HAVE A DRINK CONTAINING ALCOHOL: NEVER

## 2023-08-02 ASSESSMENT — ENCOUNTER SYMPTOMS
NAUSEA: 0
ABDOMINAL DISTENTION: 0
ABDOMINAL PAIN: 0
VOMITING: 0
DIARRHEA: 0
RHINORRHEA: 0

## 2023-08-02 ASSESSMENT — PAIN DESCRIPTION - LOCATION: LOCATION: NECK;ARM

## 2023-08-02 ASSESSMENT — PAIN SCALES - GENERAL: PAINLEVEL_OUTOF10: 10

## 2023-08-02 ASSESSMENT — PAIN DESCRIPTION - ORIENTATION: ORIENTATION: LEFT

## 2023-08-02 ASSESSMENT — PAIN - FUNCTIONAL ASSESSMENT: PAIN_FUNCTIONAL_ASSESSMENT: 0-10

## 2023-08-02 NOTE — ED TRIAGE NOTES
Pt here for evaluation of left neck, left shoulder, and left arm pain x ~1 year. States she has had xrays per PCP. No ortho eval. States she has difficulty moving the left arm d/t pain which has increased over the past 30 days. Pt advises that she has noted intermittent chest pain at night in the past 30 days also.

## 2023-08-02 NOTE — PROGRESS NOTES
IE: 75% of WNL  The patient will report no greater than 5/10 pain in order to improve quality of life.               IE: 10/10     PLAN  Yes  Continue plan of care  []  Upgrade activities as tolerated  []  Discharge due to :  []  Other:    Leni Casey, PT    8/2/2023    9:57 AM    Future Appointments   Date Time Provider 4600  [address]   8/4/2023  9:50 AM [facility]   8/7/2023 11:10 AM [facility] Olson [facility]   8/10/2023  9:50 AM [facility]   8/14/2023  2:30 PM [facility]   8/16/2023 12:30 PM [facility]   10/27/2023  7:45 AM Narda Marshall MD [facility]

## 2023-08-02 NOTE — ED NOTES
Patient is ready for discharge at this time. RN explained all instructions to this patient. Patient verbalized understanding.       Mell Draper RN  08/02/23 [id number]

## 2023-08-02 NOTE — ED PROVIDER NOTES
MCH 24.8 24.0 - 34.0 PG    MCHC 30.7 (L) 31.0 - 37.0 g/dL    RDW 15.6 (H) 11.6 - 14.5 %    Platelets 720 808 - 949 K/uL    MPV 10.5 9.2 - 11.8 FL    Nucleated RBCs 0.0 0  WBC    nRBC 0.00 0.00 - 0.01 K/uL    Neutrophils % 64 40 - 73 %    Lymphocytes % 28 21 - 52 %    Monocytes % 6 3 - 10 %    Eosinophils % 1 0 - 5 %    Basophils % 1 0 - 2 %    Immature Granulocytes 0 0.0 - 0.5 %    Neutrophils Absolute 4.2 1.8 - 8.0 K/UL    Lymphocytes Absolute 1.9 0.9 - 3.6 K/UL    Monocytes Absolute 0.4 0.05 - 1.2 K/UL    Eosinophils Absolute 0.1 0.0 - 0.4 K/UL    Basophils Absolute 0.0 0.0 - 0.1 K/UL    Absolute Immature Granulocyte 0.0 0.00 - 0.04 K/UL    Differential Type AUTOMATED     CMP    Collection Time: 08/02/23  1:00 PM   Result Value Ref Range    Sodium 140 136 - 145 mmol/L    Potassium 3.9 3.5 - 5.5 mmol/L    Chloride 108 100 - 111 mmol/L    CO2 27 21 - 32 mmol/L    Anion Gap 5 3.0 - 18 mmol/L    Glucose 113 (H) 74 - 99 mg/dL    BUN 11 7.0 - 18 MG/DL    Creatinine 0.75 0.6 - 1.3 MG/DL    Bun/Cre Ratio 15 12 - 20      Est, Glom Filt Rate >60 >60 ml/min/1.73m2    Calcium 9.0 8.5 - 10.1 MG/DL    Total Bilirubin 0.3 0.2 - 1.0 MG/DL    ALT 23 13 - 56 U/L    AST 18 10 - 38 U/L    Alk Phosphatase 160 (H) 45 - 117 U/L    Total Protein 7.7 6.4 - 8.2 g/dL    Albumin 3.4 3.4 - 5.0 g/dL    Globulin 4.3 (H) 2.0 - 4.0 g/dL    Albumin/Globulin Ratio 0.8 0.8 - 1.7     Troponin    Collection Time: 08/02/23  1:00 PM   Result Value Ref Range    Troponin, High Sensitivity <3 0 - 54 ng/L   Magnesium    Collection Time: 08/02/23  1:00 PM   Result Value Ref Range    Magnesium 1.9 1.6 - 2.6 mg/dL   Troponin    Collection Time: 08/02/23  2:00 PM   Result Value Ref Range    Troponin, High Sensitivity 3 0 - 54 ng/L       Radiologic Studies:   XR SHOULDER LEFT (MIN 2 VIEWS)   Final Result      Negative study. XR SHOULDER RIGHT (MIN 2 VIEWS)   Final Result      Negative study. XR CERVICAL SPINE (2-3 VIEWS)   Final Result      1.

## 2023-08-04 ENCOUNTER — HOSPITAL ENCOUNTER (OUTPATIENT)
Facility: HOSPITAL | Age: 53
Setting detail: RECURRING SERIES
Discharge: HOME | End: 2023-08-07
Payer: MEDICARE

## 2023-08-04 LAB
EKG ATRIAL RATE: 45 BPM
EKG DIAGNOSIS: NORMAL
EKG P AXIS: 53 DEGREES
EKG P-R INTERVAL: 202 MS
EKG Q-T INTERVAL: 466 MS
EKG QRS DURATION: 82 MS
EKG QTC CALCULATION (BAZETT): 403 MS
EKG R AXIS: 35 DEGREES
EKG T AXIS: 49 DEGREES
EKG VENTRICULAR RATE: 45 BPM

## 2023-08-04 PROCEDURE — G0283 ELEC STIM OTHER THAN WOUND: HCPCS

## 2023-08-04 PROCEDURE — 97112 NEUROMUSCULAR REEDUCATION: CPT

## 2023-08-04 PROCEDURE — 93010 ELECTROCARDIOGRAM REPORT: CPT | Performed by: INTERNAL MEDICINE

## 2023-08-04 PROCEDURE — 97110 THERAPEUTIC EXERCISES: CPT

## 2023-08-04 NOTE — PROGRESS NOTES
PHYSICAL / OCCUPATIONAL THERAPY - DAILY TREATMENT NOTE (updated )    Patient Name: Penni Goodpasture    Date: 2023    : 1970  Insurance: Payor: MEDICARE / Plan: MEDICARE PART A AND B / Product Type: *No Product type* /      Patient  verified Yes     Visit #   Current / Total 4 24   Time   In / Out 954 1045   Pain   In / Out 7 6   Subjective Functional Status/Changes: No new complaints. Reports she went to the ER after her therapy the other day due to not being able to lift left arm. The ER told her she has cervical radiculopathy. Changes to:  Meds, Allergies, Med Hx, Sx Hx? If yes, update Summary List Yes- prednisone and naproxen      TREATMENT AREA =  Other low back pain [M54.59]    OBJECTIVE    Modalities Rationale:     decrease pain to improve patient's ability to progress to PLOF and address remaining functional goals. 10 min [x] Estim Unattended, type/location: L/S IFC with patient seated towel roll at L/S                                     []  w/ice    [x]  w/heat   Skin assessment post-treatment (if applicable):    []  intact    []  redness- no adverse reaction                 []redness - adverse reaction:         Therapeutic Procedures: Tx Min Billable or 1:1 Min (if diff from Tx Min) Procedure, Rationale, Specifics   31  96480 Therapeutic Exercise (timed):  increase ROM, strength, coordination, balance, and proprioception to improve patient's ability to progress to PLOF and address remaining functional goals. (see flow sheet as applicable)     Details if applicable:       10  36621 Neuromuscular Re-Education (timed):  improve balance, coordination, kinesthetic sense, posture, core stability and proprioception to improve patient's ability to develop conscious control of individual muscles and awareness of position of extremities in order to progress to PLOF and address remaining functional goals.  (see flow sheet as applicable)     Details if applicable:            Details if

## 2023-08-07 ENCOUNTER — TELEPHONE (OUTPATIENT)
Facility: HOSPITAL | Age: 53
End: 2023-08-07

## 2023-08-07 ENCOUNTER — APPOINTMENT (OUTPATIENT)
Facility: HOSPITAL | Age: 53
End: 2023-08-07
Payer: MEDICARE

## 2023-08-10 ENCOUNTER — HOSPITAL ENCOUNTER (OUTPATIENT)
Facility: HOSPITAL | Age: 53
Setting detail: RECURRING SERIES
Discharge: HOME | End: 2023-08-13
Payer: MEDICARE

## 2023-08-10 PROCEDURE — 97112 NEUROMUSCULAR REEDUCATION: CPT

## 2023-08-10 PROCEDURE — 97110 THERAPEUTIC EXERCISES: CPT

## 2023-08-10 NOTE — PROGRESS NOTES
PHYSICAL / OCCUPATIONAL THERAPY - DAILY TREATMENT NOTE (updated )    Patient Name: Charles Seo    Date: 8/10/2023    : 1970  Insurance: Payor: MEDICARE / Plan: MEDICARE PART A AND B / Product Type: *No Product type* /      Patient  verified Yes     Visit #   Current / Total 5 24   Time   In / Out 1000 1050   Pain   In / Out 6.5 6   Subjective Functional Status/Changes: Pt reports she is doing ok. Changes to: Allergies, Med Hx, Sx Hx?   no       TREATMENT AREA =  Other low back pain [M54.59]    OBJECTIVE    Modalities Rationale:     decrease pain and increase tissue extensibility to improve patient's ability to progress to PLOF and address remaining functional goals. 10 min [x] Estim Unattended, type/location: L/S, IFC, seated                                     []  w/ice    [x]  w/heat    min [] Estim Attended, type/location:                                     []  w/US     []  w/ice    []  w/heat    []  TENS insruct      min []  Mechanical Traction: type/lbs                   []  pro   []  sup   []  int   []  cont    []  before manual    []  after manual    min []  Ultrasound, settings/location:      min []  Iontophoresis w/ dexamethasone, location:                                               []  take home patch       []  in clinic        min  unbilled []  Ice     []  Heat    location/position:     min []  Paraffin,  details:     min []  Vasopneumatic Device, press/temp:     min []  [place] / Oh Kemp: If using vaso (only need to measure limb vaso being performed on)      pre-treatment girth :       post-treatment girth :       measured at (landmark location) :      min []  Other:    Skin assessment post-treatment (if applicable):    []  intact    []  redness- no adverse reaction                 []redness - adverse reaction:         Therapeutic Procedures:   Tx Min Billable or 1:1 Min (if diff from Tx Min) Procedure, Rationale, Specifics   30  06537 Therapeutic Exercise (timed):

## 2023-08-14 ENCOUNTER — APPOINTMENT (OUTPATIENT)
Facility: HOSPITAL | Age: 53
End: 2023-08-14
Payer: MEDICARE

## 2023-08-16 ENCOUNTER — HOSPITAL ENCOUNTER (OUTPATIENT)
Facility: HOSPITAL | Age: 53
Setting detail: RECURRING SERIES
Discharge: HOME | End: 2023-08-19
Payer: MEDICARE

## 2023-08-16 PROCEDURE — G0283 ELEC STIM OTHER THAN WOUND: HCPCS

## 2023-08-16 PROCEDURE — 97112 NEUROMUSCULAR REEDUCATION: CPT

## 2023-08-16 PROCEDURE — 97530 THERAPEUTIC ACTIVITIES: CPT

## 2023-08-16 NOTE — PROGRESS NOTES
not include dry needle or estim unattended, both untimed codes, in totals to left)  8-22 min = 1 unit; 23-37 min = 2 units; 38-52 min = 3 units; 53-67 min = 4 units; 68-82 min = 5 units   Total Total     TOTAL TREATMENT TIME:        45     [x]  Patient Education billed concurrently with other procedures   [x] Review HEP    [] Progressed/Changed HEP, detail:    [] Other detail:       Objective Information/Functional Measures/Assessment    Lumbar rotation: 75%  Hip ABD strength: 4/5 MMT    The patient continues to present with higher pain levels. No change regarding the patient's lumbar rotation. Her hip ABD strength is 4/5 MMT noting improvement since onset. Patient will continue to benefit from skilled PT / OT services to modify and progress therapeutic interventions, analyze and address functional mobility deficits, analyze and address ROM deficits, analyze and address strength deficits, analyze and address soft tissue restrictions, analyze and cue for proper movement patterns, analyze and modify for postural abnormalities, and instruct in home and community integration to address functional deficits and attain remaining goals. Progress toward goals / Updated goals:  []  See Progress Note/Recertification    The patient will demonstrate independence and compliance with HEP to maximize therapeutic benefit. IE: issued HEP              Current: Met - pt reports compliance 8/02/2023  The patient will improve cervical rotation to 65 degrees B to improve ease of driving. IE: 35 degrees rotation right              Current:52 deg- progressing 8/4/23  Long Term Goals: To be accomplished in 12 weeks  The patient will improve FOTO score to 44 to improve quality of life. IE: 32  The patient will improve hip ABD strength to 4/5 MMT to improve stability in stance.               IE: 3+/5 MMT B   Current: Progressed to 4/5 MMT B  The patient will improve lumbar rotation to WNL to improve

## 2023-08-23 ENCOUNTER — HOSPITAL ENCOUNTER (OUTPATIENT)
Facility: HOSPITAL | Age: 53
Setting detail: RECURRING SERIES
Discharge: HOME | End: 2023-08-26
Payer: MEDICARE

## 2023-08-23 PROCEDURE — 97110 THERAPEUTIC EXERCISES: CPT

## 2023-08-23 PROCEDURE — 97112 NEUROMUSCULAR REEDUCATION: CPT

## 2023-08-23 NOTE — PROGRESS NOTES
Physical Therapy Discharge Instructions      [facility]  [address]  [facility]  [phone] fax    Patient: Penni Goodpasture  : 1970      Continue Home Exercise Program 1-2 times per day for 4 weeks, then decrease to 3-4 times per week      Continue with    [x] Ice  as needed      [x] Heat           Follow up with MD:     [x] Upon completion of therapy     [] As needed      Recommendations:     [x]   Return to activity with home program    []   Return to activity with the following modifications:       []Post Rehab Program    []Join Independent aquatic program     []Return to/join local gym        Additional Comments: Thank you for working with us!!!           Jose Luis Masterson PTA 2023 11:00 AM

## 2023-08-23 NOTE — PROGRESS NOTES
[facility]  [address] [address] [address]  [facility]  [phone] fax    Physical Therapy Discharge Summary  Patient name: Andrew Rojas Start of Care: 2023   Referral source: Ila Holloway* : 1970   Medical/Treatment Diagnosis: Other low back pain [M54.59]  Payor: MEDICARE / Plan: MEDICARE PART A AND B / Product Type: *No Product type* /  Onset Date:2018                 Prior Hospitalization: see medical history Provider#: [id number]   Medications: Verified on Patient Summary List    Comorbidities:  Anxiety, Arthritis, Fibromyalgia, PTSD, LBP  Prior Level of Function:The patient states she was able to perform all ADLs with improved ease of and work prior to onset. Visits from Start of Care: 7    Missed Visits: 0    Reporting Period : 2023 to 2023    Goals/Measure of Progress: The patient will demonstrate independence and compliance with HEP to maximize therapeutic benefit. IE: issued HEP              Current: Met - pt reports compliance 2023  The patient will improve cervical rotation to 65 degrees B to improve ease of driving. IE: 35 degrees rotation right              Current:52 deg- progressing 23  Long Term Goals: To be accomplished in 12 weeks  The patient will improve FOTO score to 44 to improve quality of life. IE: 31              Current: Regressed, 24  The patient will improve hip ABD strength to 4/5 MMT to improve stability in stance. IE: 3+/5 MMT B              Current: Progressed to 4/5 MMT B  The patient will improve lumbar rotation to WNL to improve ease of car transfers. IE: 75% of WNL              Current: No change - remains 75% of WNL 2023  The patient will report no greater than 5/10 pain in order to improve quality of life.               IE: 10/10              Current: Progressing, 8/10 at highest    Assessment/ Summary

## 2023-08-23 NOTE — PROGRESS NOTES
PHYSICAL / OCCUPATIONAL THERAPY - DAILY TREATMENT NOTE (updated )    Patient Name: Kartik Bunch    Date: 2023    : 1970  Insurance: Payor: MEDICARE / Plan: MEDICARE PART A AND B / Product Type: *No Product type* /      Patient  verified Yes     Visit #   Current / Total 7 24   Time   In / Out 10:38 11:28   Pain   In / Out 8 6   Subjective Functional Status/Changes: Pt reports having 8/10 pain across low back today, might have been on her feet too much this weekend. Changes to: Allergies, Med Hx, Sx Hx?   no       TREATMENT AREA =  Other low back pain [M54.59]    OBJECTIVE    Modalities Rationale:     decrease pain to improve patient's ability to progress to PLOF and address remaining functional goals. 10 min [x] Estim Unattended, type/location:  IFC to low back, seated with towel roll                                    []  w/ice    [x]  w/heat    min [] Estim Attended, type/location:                                     []  w/US     []  w/ice    []  w/heat    []  TENS insruct      min []  Mechanical Traction: type/lbs                   []  pro   []  sup   []  int   []  cont    []  before manual    []  after manual    min []  Ultrasound, settings/location:      min []  Iontophoresis w/ dexamethasone, location:                                               []  take home patch       []  in clinic        min  unbilled []  Ice     []  Heat    location/position:     min []  Paraffin,  details:     min []  Vasopneumatic Device, press/temp:     min []  Lizetta Vicente / Celi Lasso: If using vaso (only need to measure limb vaso being performed on)      pre-treatment girth :       post-treatment girth :       measured at (landmark location) :      min []  Other:    Skin assessment post-treatment (if applicable):    []  intact    []  redness- no adverse reaction                 []redness - adverse reaction:         Therapeutic Procedures:   Tx Min Billable or 1:1 Min (if diff from [employer]) Procedure,

## 2023-09-18 ENCOUNTER — OFFICE VISIT (OUTPATIENT)
Age: 53
End: 2023-09-18
Payer: MEDICARE

## 2023-09-18 VITALS — TEMPERATURE: 97.7 F | HEIGHT: 61 IN | BODY MASS INDEX: 31.34 KG/M2 | WEIGHT: 166 LBS

## 2023-09-18 DIAGNOSIS — G89.29 CHRONIC PAIN OF LEFT KNEE: ICD-10-CM

## 2023-09-18 DIAGNOSIS — M79.7 FIBROMYALGIA: ICD-10-CM

## 2023-09-18 DIAGNOSIS — M17.11 PRIMARY OSTEOARTHRITIS OF RIGHT KNEE: ICD-10-CM

## 2023-09-18 DIAGNOSIS — M25.562 CHRONIC PAIN OF LEFT KNEE: ICD-10-CM

## 2023-09-18 DIAGNOSIS — G89.29 CHRONIC PAIN OF RIGHT KNEE: ICD-10-CM

## 2023-09-18 DIAGNOSIS — M17.12 PRIMARY OSTEOARTHRITIS OF LEFT KNEE: Primary | ICD-10-CM

## 2023-09-18 DIAGNOSIS — M25.561 CHRONIC PAIN OF RIGHT KNEE: ICD-10-CM

## 2023-09-18 PROCEDURE — G8417 CALC BMI ABV UP PARAM F/U: HCPCS | Performed by: SPECIALIST

## 2023-09-18 PROCEDURE — G8427 DOCREV CUR MEDS BY ELIG CLIN: HCPCS | Performed by: SPECIALIST

## 2023-09-18 PROCEDURE — 99213 OFFICE O/P EST LOW 20 MIN: CPT | Performed by: SPECIALIST

## 2023-09-18 PROCEDURE — 3017F COLORECTAL CA SCREEN DOC REV: CPT | Performed by: SPECIALIST

## 2023-09-18 PROCEDURE — 20610 DRAIN/INJ JOINT/BURSA W/O US: CPT | Performed by: SPECIALIST

## 2023-09-18 PROCEDURE — 4004F PT TOBACCO SCREEN RCVD TLK: CPT | Performed by: SPECIALIST

## 2023-09-18 RX ORDER — BETAMETHASONE SODIUM PHOSPHATE AND BETAMETHASONE ACETATE 3; 3 MG/ML; MG/ML
3 INJECTION, SUSPENSION INTRA-ARTICULAR; INTRALESIONAL; INTRAMUSCULAR; SOFT TISSUE ONCE
Status: COMPLETED | OUTPATIENT
Start: 2023-09-18 | End: 2023-09-18

## 2023-09-18 RX ADMIN — BETAMETHASONE SODIUM PHOSPHATE AND BETAMETHASONE ACETATE 3 MG: 3; 3 INJECTION, SUSPENSION INTRA-ARTICULAR; INTRALESIONAL; INTRAMUSCULAR; SOFT TISSUE at 14:54

## 2023-09-28 ENCOUNTER — HOSPITAL ENCOUNTER (OUTPATIENT)
Facility: HOSPITAL | Age: 53
Setting detail: RECURRING SERIES
End: 2023-09-28
Payer: MEDICARE

## 2023-09-28 PROCEDURE — 97535 SELF CARE MNGMENT TRAINING: CPT

## 2023-09-28 PROCEDURE — 97110 THERAPEUTIC EXERCISES: CPT

## 2023-09-28 PROCEDURE — 97163 PT EVAL HIGH COMPLEX 45 MIN: CPT

## 2023-09-28 NOTE — PROGRESS NOTES
PHYSICAL / OCCUPATIONAL THERAPY - DAILY TREATMENT NOTE (updated )    Patient Name: Laurel Rader    Date: 2023    : 1970  Insurance: Payor: [facility] MEDICARE / Plan: Julio Hawley COMPLETE / Product Type: *No Product type* /      Patient  verified Yes     Visit #   Current / Total 1 24   Time   In / Out 1033 1109   Pain   In / Out 7back 6 neck 7 back 6 neck   Subjective Functional Status/Changes: See eval   Changes to:  Meds, Allergies, Med Hx, Sx Hx? If yes, update Summary List no       TREATMENT AREA =  Cervicalgia [M54.2]  Right shoulder pain [M25.511]  Left shoulder pain [M25.512]  Other low back pain [M54.59]    OBJECTIVE        Therapeutic Procedures: Tx Min Billable or 1:1 Min (if diff from Tx Min) Procedure, Rationale, Specifics   15  42130 Therapeutic Exercise (timed):  increase ROM, strength, coordination, balance, and proprioception to improve patient's ability to progress to PLOF and address remaining functional goals. (see flow sheet as applicable)     Details if applicable:       8  74680 Self Care/Home Management (timed):  improve patient knowledge and understanding of diagnosis/prognosis and physical therapy expectations, procedures and progression  to improve patient's ability to progress to PLOF and address remaining functional goals.   (see flow sheet as applicable)     Details if applicable:            Details if applicable:            Details if applicable:            Details if applicable:     21  [facility] Totals Reminder: bill using total billable min of TIMED therapeutic procedures (example: do not include dry needle or estim unattended, both untimed codes, in totals to left)  8-22 min = 1 unit; 23-37 min = 2 units; 38-52 min = 3 units; 53-67 min = 4 units; 68-82 min = 5 units   Total Total     TOTAL TREATMENT TIME:        36     [x]  Patient Education billed concurrently with other procedures   [x] Review HEP    [] Progressed/Changed HEP, detail:    [] Other detail: 10/13/2023  9:50 AM Surekha Foxdebbie, PT [facility]   10/16/2023 10:30 AM Patience Grimes MD [facility] AMB   10/18/2023 11:50 AM Sabrina Melena, PTA [facility]   10/20/2023  9:50 AM Soniya Apple, PT [facility]   10/25/2023  9:50 AM Soniya Apple, PT [facility]   10/27/2023  9:50 AM Sabrina Melena, PTA [facility]   11/1/2023 10:30 AM Jess Alvarez MD [facility] BS AMB   11/1/2023 12:30 PM Enrico Ravi, PTA [facility]   11/3/2023  9:50 AM Soniya Apple, PT [facility]   11/20/2023 10:30 AM Patience Grimes MD [facility] BS AMB

## 2023-09-28 NOTE — PROGRESS NOTES
Decision Making:  HIGH Complexity : FOTO score of 1- 25  FOTO score = an established functional score where 100 = no disability  Overall Complexity Rating: HIGH   Problem List: pain affecting function, decrease ROM, decrease strength, decrease ADL/functional abilities, decrease activity tolerance, and decrease flexibility/joint mobility   Treatment Plan may include any combination of the followin Therapeutic Exercise, 98750 Neuromuscular Re-Education, 56625 Therapeutic Activity, and 80248 Self Care/Home Management    Patient / Family readiness to learn indicated by: asking questions, trying to perform skills, interest, return verbalization , and return demonstration   Persons(s) to be included in education: patient (P)  Barriers to Learning/Limitations: none  Measures taken if barriers to learning present: none  Patient Goal (s): pain management  Patient Self Reported Health Status: fair  Rehabilitation Potential: fair    Short Term Goals: To be accomplished in 2 weeks  1. I and compliant with HEP for self management of symptoms. 2. Increase L/S flexion to finger tips to toes to increase ease with ADLs. Long Term Goals: To be accomplished in 12 weeks  1. Improve neck FOTO to 46 to indicate improved function with daily activities. 2. Improve back FOTO to 35 to indicate improved function with daily activities. 3. Increase B hip abd/ext strength to 5/5 to improve stability for negotiating stairs. 4. Increase B middle strap strength to 4+/5 to improve stability for using a vacuum . 5.Increase C/S AROM by 5 deg in all directions to increase ease with turning to look behind while driving. Frequency / Duration: Patient to be seen 2 times per week for 12 weeks  Goals will be assigned and reassessed every 10 visits/ 30 days per guidelines .     Patient/ Caregiver education and instruction: Diagnosis, prognosis, self care and exercises [x]  Plan of care has been reviewed with PTA    Certification Period: 9/28/23-12/27/23    Malvin Pascal, PT       9/28/2023       46:38 AM    I certify that the above Therapy Services are being furnished while the patient is under my care. I agree with the treatment plan and certify that this therapy is necessary. Physician's Signature:_________________________   DATE:_________   TIME:________                           William Keyes*  Insurance: Payor: Leonora Bledsoe / Plan: Maverick Sexton COMPLETE / Product Type: *No Product type* /      ** Signature, Date and Time must be completed for valid certification **  [facility] or you may fax the signed copy to [phone]. Thank you.

## 2023-10-02 ENCOUNTER — OFFICE VISIT (OUTPATIENT)
Age: 53
End: 2023-10-02

## 2023-10-02 VITALS — WEIGHT: 166 LBS | BODY MASS INDEX: 31.34 KG/M2 | HEIGHT: 61 IN | TEMPERATURE: 97.4 F

## 2023-10-02 DIAGNOSIS — M17.12 UNILATERAL PRIMARY OSTEOARTHRITIS, LEFT KNEE: ICD-10-CM

## 2023-10-02 DIAGNOSIS — M17.11 UNILATERAL PRIMARY OSTEOARTHRITIS, RIGHT KNEE: Primary | ICD-10-CM

## 2023-10-02 RX ORDER — DICLOFENAC SODIUM 75 MG/1
TABLET, DELAYED RELEASE ORAL
COMMUNITY
Start: 2023-09-06

## 2023-10-04 ENCOUNTER — OFFICE VISIT (OUTPATIENT)
Age: 53
End: 2023-10-04
Payer: MEDICAID

## 2023-10-04 VITALS
HEIGHT: 61 IN | RESPIRATION RATE: 16 BRPM | HEART RATE: 92 BPM | SYSTOLIC BLOOD PRESSURE: 115 MMHG | BODY MASS INDEX: 31.53 KG/M2 | TEMPERATURE: 97.8 F | DIASTOLIC BLOOD PRESSURE: 75 MMHG | WEIGHT: 167 LBS

## 2023-10-04 DIAGNOSIS — G89.29 CHRONIC PRIMARY MUSCULOSKELETAL PAIN: Primary | ICD-10-CM

## 2023-10-04 DIAGNOSIS — M79.18 CERVICAL MYOFASCIAL PAIN SYNDROME: ICD-10-CM

## 2023-10-04 DIAGNOSIS — M54.2 CERVICAL PAIN: ICD-10-CM

## 2023-10-04 DIAGNOSIS — M79.18 CHRONIC PRIMARY MUSCULOSKELETAL PAIN: Primary | ICD-10-CM

## 2023-10-04 DIAGNOSIS — M54.12 CERVICAL RADICULOPATHY: ICD-10-CM

## 2023-10-04 DIAGNOSIS — M79.602 LEFT ARM PAIN: ICD-10-CM

## 2023-10-04 PROCEDURE — 99214 OFFICE O/P EST MOD 30 MIN: CPT | Performed by: PHYSICAL MEDICINE & REHABILITATION

## 2023-10-04 RX ORDER — GABAPENTIN 300 MG/1
300 CAPSULE ORAL 3 TIMES DAILY
Qty: 90 CAPSULE | Refills: 2 | Status: SHIPPED | OUTPATIENT
Start: 2023-10-04 | End: 2023-11-03

## 2023-10-04 ASSESSMENT — ENCOUNTER SYMPTOMS
NAUSEA: 0
VOMITING: 0
TROUBLE SWALLOWING: 0
SHORTNESS OF BREATH: 0
WHEEZING: 0

## 2023-10-05 ENCOUNTER — HOSPITAL ENCOUNTER (OUTPATIENT)
Facility: HOSPITAL | Age: 53
Setting detail: RECURRING SERIES
Discharge: HOME | End: 2023-10-08
Payer: MEDICAID

## 2023-10-05 PROCEDURE — 97110 THERAPEUTIC EXERCISES: CPT

## 2023-10-05 PROCEDURE — 97112 NEUROMUSCULAR REEDUCATION: CPT

## 2023-10-05 NOTE — PROGRESS NOTES
58 right rot; 50 left rot    PLAN  Yes  Continue plan of care  []  Upgrade activities as tolerated  []  Discharge due to :  []  Other:    Jose Nieves, PTA    10/5/2023    9:56 AM    Future Appointments   Date Time Provider [address]   10/6/2023 11:50 AM Helon Minion, PT [facility]   10/9/2023 10:30 AM Lindsay Crawford MD [facility] BS AMB   10/11/2023 11:10 AM Helon Minion, PT [facility]   10/13/2023  9:50 AM Zetta Rias, PT [facility]   10/16/2023 10:30 AM Lisa Hussein MD [facility] BS AMB   10/18/2023 11:50 AM Jose Nieves, PTA [facility]   10/20/2023  9:50 AM Helon Minion, PT [facility]   10/25/2023  9:50 AM Helon Minion, PT [facility]   10/27/2023  9:50 AM Jose Nieves, PTA [facility]   11/1/2023 10:30 AM Mohsen Fox MD [facility] BS AMB   11/1/2023 12:30 PM Dano Reyna, PTA [facility]   11/3/2023  9:50 AM Zetta Rias, PT [facility]   11/15/2023 10:30 AM Kristy Huang MD [facility] BS AMB   11/20/2023 10:30 AM Lisa Hussein MD [facility] BS AMB

## 2023-10-06 ENCOUNTER — HOSPITAL ENCOUNTER (OUTPATIENT)
Facility: HOSPITAL | Age: 53
Setting detail: RECURRING SERIES
End: 2023-10-06
Payer: MEDICAID

## 2023-10-09 ENCOUNTER — OFFICE VISIT (OUTPATIENT)
Age: 53
End: 2023-10-09

## 2023-10-09 VITALS — BODY MASS INDEX: 31.53 KG/M2 | TEMPERATURE: 97.5 F | HEIGHT: 61 IN | WEIGHT: 167 LBS

## 2023-10-09 DIAGNOSIS — M79.89 CALF SWELLING: ICD-10-CM

## 2023-10-09 DIAGNOSIS — M17.11 UNILATERAL PRIMARY OSTEOARTHRITIS, RIGHT KNEE: Primary | ICD-10-CM

## 2023-10-09 DIAGNOSIS — M17.12 UNILATERAL PRIMARY OSTEOARTHRITIS, LEFT KNEE: ICD-10-CM

## 2023-10-09 DIAGNOSIS — M79.669 CALF TENDERNESS: ICD-10-CM

## 2023-10-09 NOTE — PROGRESS NOTES
Central scheduling called at this time to schedule duplex. Scheduled with [facility] tomorrow morning 0900. Directions given to patient. Addend: Patient order changed to bilateral calves at request. Okay per Dr. Renay Scott. New order placed for bilateral venous studies. Patient present in lobby at time of order signature by nurse.

## 2023-10-10 ENCOUNTER — HOSPITAL ENCOUNTER (OUTPATIENT)
Facility: HOSPITAL | Age: 53
Discharge: HOME | End: 2023-10-12
Attending: SPECIALIST
Payer: MEDICAID

## 2023-10-10 DIAGNOSIS — M79.669 CALF TENDERNESS: ICD-10-CM

## 2023-10-10 DIAGNOSIS — M79.89 CALF SWELLING: ICD-10-CM

## 2023-10-10 PROCEDURE — 93970 EXTREMITY STUDY: CPT | Performed by: SURGERY

## 2023-10-10 PROCEDURE — 93970 EXTREMITY STUDY: CPT

## 2023-10-11 ENCOUNTER — APPOINTMENT (OUTPATIENT)
Facility: HOSPITAL | Age: 53
End: 2023-10-11
Payer: MEDICAID

## 2023-10-13 ENCOUNTER — APPOINTMENT (OUTPATIENT)
Facility: HOSPITAL | Age: 53
End: 2023-10-13
Payer: MEDICAID

## 2023-10-16 ENCOUNTER — OFFICE VISIT (OUTPATIENT)
Age: 53
End: 2023-10-16
Payer: MEDICAID

## 2023-10-16 VITALS — HEIGHT: 61 IN | BODY MASS INDEX: 31.53 KG/M2 | WEIGHT: 167 LBS | TEMPERATURE: 97.5 F

## 2023-10-16 DIAGNOSIS — M17.11 UNILATERAL PRIMARY OSTEOARTHRITIS, RIGHT KNEE: Primary | ICD-10-CM

## 2023-10-16 DIAGNOSIS — M17.12 UNILATERAL PRIMARY OSTEOARTHRITIS, LEFT KNEE: ICD-10-CM

## 2023-10-16 PROCEDURE — 20610 DRAIN/INJ JOINT/BURSA W/O US: CPT | Performed by: SPECIALIST

## 2023-10-18 ENCOUNTER — APPOINTMENT (OUTPATIENT)
Facility: HOSPITAL | Age: 53
End: 2023-10-18
Payer: MEDICAID

## 2023-10-20 ENCOUNTER — APPOINTMENT (OUTPATIENT)
Facility: HOSPITAL | Age: 53
End: 2023-10-20
Payer: MEDICAID

## 2023-10-25 ENCOUNTER — HOSPITAL ENCOUNTER (OUTPATIENT)
Facility: HOSPITAL | Age: 53
Setting detail: RECURRING SERIES
Discharge: HOME | End: 2023-10-28
Payer: MEDICAID

## 2023-10-25 PROCEDURE — 97112 NEUROMUSCULAR REEDUCATION: CPT

## 2023-10-25 PROCEDURE — 97110 THERAPEUTIC EXERCISES: CPT

## 2023-10-25 NOTE — PROGRESS NOTES
PHYSICAL / OCCUPATIONAL THERAPY - DAILY TREATMENT NOTE (updated )    Patient Name: Jessica Robledo    Date: 10/25/2023    : 1970  Insurance: Payor: [employer] MEDICARE COMMUNITY PL / Plan: [facility] COMM DUAL COMP HMO SNP / Product Type: *No Product type* /      Patient  verified Yes     Visit #   Current / Total 3 24   Time   In / Out 958 1030   Pain   In / Out 7 7   Subjective Functional Status/Changes: I got gel shots in my knees. Changes to:  Meds, Allergies, Med Hx, Sx Hx? If yes, update Summary List no       TREATMENT AREA =  Cervicalgia [M54.2]  Right shoulder pain [M25.511]  Left shoulder pain [M25.512]  Other low back pain [M54.59]    OBJECTIVE    Therapeutic Procedures: Tx Min Billable or 1:1 Min (if diff from Tx Min) Procedure, Rationale, Specifics   24  62913 Therapeutic Exercise (timed):  increase ROM, strength, coordination, balance, and proprioception to improve patient's ability to progress to PLOF and address remaining functional goals. (see flow sheet as applicable)     Details if applicable:       8  84615 Neuromuscular Re-Education (timed):  improve balance, coordination, kinesthetic sense, posture, core stability and proprioception to improve patient's ability to develop conscious control of individual muscles and awareness of position of extremities in order to progress to PLOF and address remaining functional goals.  (see flow sheet as applicable)     Details if applicable:            Details if applicable:            Details if applicable:            Details if applicable:     28  [facility] Totals Reminder: bill using total billable min of TIMED therapeutic procedures (example: do not include dry needle or estim unattended, both untimed codes, in totals to left)  8-22 min = 1 unit; 23-37 min = 2 units; 38-52 min = 3 units; 53-67 min = 4 units; 68-82 min = 5 units   Total Total     TOTAL TREATMENT TIME:        32     [x]  Patient Education billed concurrently with other procedures

## 2023-10-31 ASSESSMENT — ENCOUNTER SYMPTOMS
NAUSEA: 0
COLOR CHANGE: 0
BLOOD IN STOOL: 0
DIARRHEA: 0
COUGH: 0
APNEA: 0
ABDOMINAL PAIN: 0
WHEEZING: 0
CHEST TIGHTNESS: 0
CONSTIPATION: 0

## 2023-10-31 NOTE — PROGRESS NOTES
HISTORY OF PRESENT ILLNESS  Yudith Springer is a 46 y.o. female. PMH Anxiety, Tobacco Abuse, is presenting in followup  ----  CARDIAC STUDIES  ----  EKG 7/20/2022  Normal sinus rhythm  ----  2d tte 4/12/2023    Left Ventricle: Normal left ventricular systolic function with a visually estimated EF of 55 - 60%. EF by 2D Simpsons Biplane is 55%. Left ventricle size is normal. Normal wall thickness. See diagram for wall motion findings. Normal diastolic function.  ----  4/12/2023 MPI    Stress Combined Conclusion: Normal pharmacological myocardial perfusion study. Stress Function: Left ventricular function post-stress is normal. Post-stress ejection fraction is 64%. Perfusion Comments: LV perfusion is normal.    Perfusion Conclusion: There is no evidence of transient ischemic dilation (TID). ECG: Resting ECG demonstrates normal sinus rhythm. ECG: The ECG was negative for ischemia. Symptoms of chest pain reported during stress test.    Stress Test: A pharmacological stress test was performed using lexiscan. Hemodynamics are adequate for diagnosis. Blood pressure demonstrated a normal response and heart rate demonstrated a normal response to stress. The patient's heart rate recovery was normal. The patient reported chest pain during the stress test.    SSS = 3, SRS = 2, SDS = 1  ----  Vascular duplex lower extremity right and left 10/11/2023  No evidence of DVT RLE, No evidence of DVT LLE  ----    Have you had Fatigue? No  2. Have you had have you had Chest Pain? No   3. Have you had Dyspnea (SOB) ? Current smoking Dyspnea Ccs II     4. Have you had Orthopnea? No   5. Have you had PND? No   6. Have you had leg swelling? No   7. Have you had any weight gain? No   8. Have you had any palpitations? Yes if so how long: on and off for a could of months    Started a couple of months ago lasts minutes. 9. Have you had any syncope? No   10. Do you have any wounds on legs?  no    Reviewed with the

## 2023-11-01 ENCOUNTER — OFFICE VISIT (OUTPATIENT)
Age: 53
End: 2023-11-01
Payer: MEDICAID

## 2023-11-01 ENCOUNTER — HOSPITAL ENCOUNTER (OUTPATIENT)
Facility: HOSPITAL | Age: 53
Setting detail: RECURRING SERIES
Discharge: HOME | End: 2023-11-04
Payer: MEDICAID

## 2023-11-01 VITALS
WEIGHT: 164 LBS | BODY MASS INDEX: 30.96 KG/M2 | HEART RATE: 81 BPM | SYSTOLIC BLOOD PRESSURE: 116 MMHG | DIASTOLIC BLOOD PRESSURE: 68 MMHG | HEIGHT: 61 IN | OXYGEN SATURATION: 97 %

## 2023-11-01 DIAGNOSIS — R07.89 ATYPICAL CHEST PAIN: Primary | ICD-10-CM

## 2023-11-01 DIAGNOSIS — Z72.0 TOBACCO ABUSE: ICD-10-CM

## 2023-11-01 DIAGNOSIS — R00.2 PALPITATIONS: ICD-10-CM

## 2023-11-01 DIAGNOSIS — R06.09 DYSPNEA ON EXERTION: ICD-10-CM

## 2023-11-01 PROCEDURE — 94010 BREATHING CAPACITY TEST: CPT | Performed by: INTERNAL MEDICINE

## 2023-11-01 PROCEDURE — 97112 NEUROMUSCULAR REEDUCATION: CPT

## 2023-11-01 PROCEDURE — 99214 OFFICE O/P EST MOD 30 MIN: CPT | Performed by: INTERNAL MEDICINE

## 2023-11-01 PROCEDURE — 97110 THERAPEUTIC EXERCISES: CPT

## 2023-11-01 ASSESSMENT — ENCOUNTER SYMPTOMS: SHORTNESS OF BREATH: 0

## 2023-11-01 NOTE — PROGRESS NOTES
[address] [facility]  [address] [facility] - Ph: [phone]   Fx: [phone]    PHYSICAL THERAPY PROGRESS NOTE      Patient name: Laurel Rader Start of Care: 2023   Referral source: Lizbet Olea* : 1970    Medical Diagnosis: Cervicalgia [M54.2]  Right shoulder pain [M25.511]  Left shoulder pain [M25.512]  Other low back pain [M54.59]  Payor: [employer] MEDICARE COMMUNITY PL / Plan: [employer]  COMM DUAL COMP HMO SNP / Product Type: *No Product type* /  Onset Date:2023    Treatment Diagnosis:  M54.2  NECK PAIN and M54.59  OTHER LOWER BACK PAIN                                Prior Hospitalization: see medical history Provider#: [id number]   Medications: Verified on Patient summary List   Comorbidities:  FMS; +tobacco; anxiety; PTSD  Prior Level of Function:Able to perform daily activities prior to onset; sedentary lifestyle    Visits from Start of Care: 4    Missed Visits: 2    Goals/Measure of Progress: To be achieved in 8 weeks:    Short Term Goals: To be accomplished in 2 weeks  1. I and compliant with HEP for self management of symptoms. IE:issued HEP  PN: Met, pt reports compliance. 10/5/2023  2. Increase L/S flexion to finger tips to toes to increase ease with ADLs. IE:2\" from toes  PN: 2\" no change 10/25/23  Long Term Goals: To be accomplished in 12 weeks  1. Improve neck FOTO to 46 to indicate improved function with daily activities. IE:31  PN: 32, progressing  2. Improve back FOTO to 35 to indicate improved function with daily activities. IE:19  PN: 25, progressing  3. Increase B hip abd/ext strength to 5/5 to improve stability for negotiating stairs. IE: B abd 4/5; B  ext 4-/5  PN: B abd 4/5 no change  4. Increase B middle strap strength to 4+/5 to improve stability for using a vacuum . IE:right 3+/5; left 3/5  PN: right 3+/5; left 3/5, no change  5. Increase C/S AROM by 5 deg in all directions
AROM by 5 deg in all directions to increase ease with turning to look behind while driving.    IE:27 flex; 28 ext; 19 right lat flex; 11 left lat flex; 58 right rot; 50 left rot  Current:flex 40 deg; 42 ext; 25 right lat flex; 15 left lat flex; B rot 45 partially met 10/25/23    PLAN  Yes  Continue plan of care  [x]  Upgrade activities as tolerated  []  Discharge due to :  []  Other:    Katie Bates PTA    11/1/2023    10:08 AM    Future Appointments   Date Time Provider [address]   11/1/2023 10:30 AM Madiha Gray MD [facility] AMB   11/1/2023 12:30 PM Katie Bates PTA [facility] [place]   11/3/2023  1:10 [facility]   11/8/2023  1:50 PM Marilynn Morris [facility] [place]   11/10/2023 12:30 PM [facility], [facility]   11/15/2023 10:30 AM Sydnee Gee MD [address] BS AMB   11/15/2023 12:30 PM Nilo Woo, PT [facility]   11/20/2023 10:30 AM Maurilio Brumfield MD [facility] BS AMB   11/20/2023 12:30 PM [facility], PTA [facility]

## 2023-11-01 NOTE — PROGRESS NOTES
Have you had Fatigue? No  2. Have you had have you had Chest Pain? No   3. Have you had Dyspnea (SOB) ? No     4. Have you had Orthopnea? No   5. Have you had PND? No   6. Have you had leg swelling? No   7. Have you had any weight gain? No   8. Have you had any palpitations? Yes if so how long: on and off for a could of months      9. Have you had any syncope? No   10. Do you have any wounds on legs?  no

## 2023-11-01 NOTE — PATIENT INSTRUCTIONS
Learning About the [address] [place] [address] Diet  What is the Mediterranean diet? The Mediterranean diet is a style of eating rather than a diet plan. It features foods eaten in [place], [place], [place], and other countries along the [facility]. It emphasizes eating foods like fish, fruits, vegetables, beans, high-fiber breads and whole grains, nuts, and olive oil. This style of eating includes limited red meat, cheese, and sweets. Why choose the Mediterranean diet? A Mediterranean-style diet may improve heart health. It contains more fat than other heart-healthy diets. But the fats are mainly from nuts, unsaturated oils (such as fish oils and olive oil), and certain nut or seed oils (such as canola, soybean, or flaxseed oil). These fats may help protect the heart and blood vessels. How can you get started on the Mediterranean diet? Here are some things you can do to switch to a more Mediterranean way of eating. What to eat  Eat a variety of fruits and vegetables each day, such as grapes, blueberries, tomatoes, broccoli, peppers, figs, olives, spinach, eggplant, beans, lentils, and chickpeas. Eat a variety of whole-grain foods each day, such as oats, brown rice, and whole wheat bread, pasta, and couscous. Eat fish at least 2 times a week. Try tuna, salmon, mackerel, lake trout, herring, or sardines. Eat moderate amounts of low-fat dairy products, such as milk, cheese, or yogurt. Eat moderate amounts of poultry and eggs. Choose healthy (unsaturated) fats, such as nuts, olive oil, and certain nut or seed oils like canola, soybean, and flaxseed. Limit unhealthy (saturated) fats, such as butter, palm oil, and coconut oil. And limit fats found in animal products, such as meat and dairy products made with whole milk. Try to eat red meat only a few times a month in very small amounts. Limit sweets and desserts to only a few times a week. This includes sugar-sweetened drinks like soda.   The

## 2023-11-03 ENCOUNTER — HOSPITAL ENCOUNTER (OUTPATIENT)
Facility: HOSPITAL | Age: 53
Setting detail: RECURRING SERIES
Discharge: HOME | End: 2023-11-06
Payer: MEDICAID

## 2023-11-03 PROCEDURE — 97112 NEUROMUSCULAR REEDUCATION: CPT

## 2023-11-03 PROCEDURE — 97110 THERAPEUTIC EXERCISES: CPT

## 2023-11-03 NOTE — PROGRESS NOTES
PHYSICAL / OCCUPATIONAL THERAPY - DAILY TREATMENT NOTE (updated )    Patient Name: Juanita Juarez    Date: 11/3/2023    : 1970  Insurance: Payor: [employer] MEDICARE COMMUNITY PL / Plan: [facility] COMM DUAL COMP HMO SNP / Product Type: *No Product type* /      Patient  verified Yes     Visit #   Current / Total 5 24   Time   In / Out 1:15 1:50   Pain   In / Out 8/10 8/10   Subjective Functional Status/Changes: \"My back is about an 8/10. \"   Changes to: Allergies, Med Hx, Sx Hx?   no       TREATMENT AREA =  Cervicalgia [M54.2]  Right shoulder pain [M25.511]  Left shoulder pain [M25.512]  Other low back pain [M54.59]    OBJECTIVE    Therapeutic Procedures: Tx Min Billable or 1:1 Min (if diff from Tx Min) Procedure, Rationale, Specifics   23  14044 Therapeutic Exercise (timed):  increase ROM, strength, coordination, balance, and proprioception to improve patient's ability to progress to PLOF and address remaining functional goals. (see flow sheet as applicable)    Details if applicable:       12  03866 Neuromuscular Re-Education (timed):  improve balance, coordination, kinesthetic sense, posture, core stability and proprioception to improve patient's ability to develop conscious control of individual muscles and awareness of position of extremities in order to progress to PLOF and address remaining functional goals.  (see flow sheet as applicable)    Details if applicable:  Trapeze bar series, hook-lying stabs,   35  [facility] Totals Reminder: bill using total billable min of TIMED therapeutic procedures (example: do not include dry needle or estim unattended, both untimed codes, in totals to left)  8-22 min = 1 unit; 23-37 min = 2 units; 38-52 min = 3 units; 53-67 min = 4 units; 68-82 min = 5 units   Total Total     TOTAL TREATMENT TIME:        35     [x]  Patient Education billed concurrently with other procedures   [x] Review HEP    [] Progressed/Changed HEP, detail:    [] Other detail:       Objective

## 2023-11-08 ENCOUNTER — TELEPHONE (OUTPATIENT)
Facility: HOSPITAL | Age: 53
End: 2023-11-08

## 2023-11-08 ENCOUNTER — HOSPITAL ENCOUNTER (OUTPATIENT)
Facility: HOSPITAL | Age: 53
Setting detail: RECURRING SERIES
Discharge: HOME | End: 2023-11-11
Payer: MEDICAID

## 2023-11-08 PROCEDURE — 97530 THERAPEUTIC ACTIVITIES: CPT

## 2023-11-08 PROCEDURE — 97112 NEUROMUSCULAR REEDUCATION: CPT

## 2023-11-08 PROCEDURE — 97110 THERAPEUTIC EXERCISES: CPT

## 2023-11-08 NOTE — PROGRESS NOTES
left)  8-22 min = 1 unit; 23-37 min = 2 units; 38-52 min = 3 units; 53-67 min = 4 units; 68-82 min = 5 units   Total Total     TOTAL TREATMENT TIME:        40     [x]  Patient Education billed concurrently with other procedures   [x] Review HEP    [] Progressed/Changed HEP, detail:    [] Other detail:       Objective Information/Functional Measures/Assessment    Mod vc's required for form with all therapeutic exercises. Patient will continue to benefit from skilled PT / OT services to modify and progress therapeutic interventions, analyze and address functional mobility deficits, analyze and address ROM deficits, analyze and address strength deficits, analyze and address soft tissue restrictions, analyze and cue for proper movement patterns, analyze and modify for postural abnormalities, and analyze and address imbalance/dizziness to address functional deficits and attain remaining goals. Progress toward goals / Updated goals:  []  See Progress Note/Recertification       Short Term Goals: To be accomplished in 2 weeks  1. I and compliant with HEP for self management of symptoms. IE:issued HEP  PN: Met, pt reports compliance. 2. Increase L/S flexion to finger tips to toes to increase ease with ADLs. IE:2\" from toes  PN: 2\" no change  Long Term Goals: To be accomplished in 12 weeks  1. Improve neck FOTO to 46 to indicate improved function with daily activities. IE:31  PN: 32, progressing  2. Improve back FOTO to 35 to indicate improved function with daily activities. IE:19  PN: 25, progressing  3. Increase B hip abd/ext strength to 5/5 to improve stability for negotiating stairs. IE: B abd 4/5; B  ext 4-/5  PN: B abd 4/5 no change  4. Increase B middle strap strength to 4+/5 to improve stability for using a vacuum . IE:right 3+/5; left 3/5  PN: right 3+/5; left 3/5, no change  5. Increase C/S AROM by 5 deg in all directions to increase ease with turning to look behind while driving.    IE:27

## 2023-11-10 ENCOUNTER — APPOINTMENT (OUTPATIENT)
Facility: HOSPITAL | Age: 53
End: 2023-11-10
Payer: MEDICAID

## 2023-11-15 ENCOUNTER — HOSPITAL ENCOUNTER (OUTPATIENT)
Facility: HOSPITAL | Age: 53
Setting detail: RECURRING SERIES
Discharge: HOME | End: 2023-11-18
Payer: MEDICAID

## 2023-11-15 ENCOUNTER — OFFICE VISIT (OUTPATIENT)
Age: 53
End: 2023-11-15
Payer: MEDICAID

## 2023-11-15 VITALS
TEMPERATURE: 98 F | SYSTOLIC BLOOD PRESSURE: 112 MMHG | HEART RATE: 81 BPM | HEIGHT: 61 IN | DIASTOLIC BLOOD PRESSURE: 65 MMHG | WEIGHT: 165 LBS | BODY MASS INDEX: 31.15 KG/M2 | OXYGEN SATURATION: 96 %

## 2023-11-15 DIAGNOSIS — R20.2 NUMBNESS AND TINGLING OF LEFT UPPER EXTREMITY: ICD-10-CM

## 2023-11-15 DIAGNOSIS — M54.12 CERVICAL RADICULOPATHY: Primary | ICD-10-CM

## 2023-11-15 DIAGNOSIS — F40.240 CLAUSTROPHOBIA: ICD-10-CM

## 2023-11-15 DIAGNOSIS — R20.0 NUMBNESS AND TINGLING OF LEFT UPPER EXTREMITY: ICD-10-CM

## 2023-11-15 DIAGNOSIS — M25.512 LEFT SHOULDER PAIN, UNSPECIFIED CHRONICITY: ICD-10-CM

## 2023-11-15 DIAGNOSIS — M79.18 CHRONIC PRIMARY MUSCULOSKELETAL PAIN: ICD-10-CM

## 2023-11-15 DIAGNOSIS — G89.29 CHRONIC PRIMARY MUSCULOSKELETAL PAIN: ICD-10-CM

## 2023-11-15 DIAGNOSIS — M79.18 CERVICAL MYOFASCIAL PAIN SYNDROME: ICD-10-CM

## 2023-11-15 PROCEDURE — 97110 THERAPEUTIC EXERCISES: CPT

## 2023-11-15 PROCEDURE — 99214 OFFICE O/P EST MOD 30 MIN: CPT | Performed by: PHYSICAL MEDICINE & REHABILITATION

## 2023-11-15 PROCEDURE — 97112 NEUROMUSCULAR REEDUCATION: CPT

## 2023-11-15 RX ORDER — GABAPENTIN 300 MG/1
600 CAPSULE ORAL 3 TIMES DAILY
Qty: 180 CAPSULE | Refills: 2 | Status: SHIPPED | OUTPATIENT
Start: 2023-11-15 | End: 2024-02-13

## 2023-11-15 RX ORDER — DIAZEPAM 5 MG/1
TABLET ORAL
Qty: 2 TABLET | Refills: 0 | Status: SHIPPED | OUTPATIENT
Start: 2023-11-15 | End: 2023-12-31

## 2023-11-15 ASSESSMENT — ENCOUNTER SYMPTOMS
VOMITING: 0
SHORTNESS OF BREATH: 0
TROUBLE SWALLOWING: 0
NAUSEA: 0
WHEEZING: 0

## 2023-11-15 NOTE — PROGRESS NOTES
González Reyna  : 1970  PCP: Kathleen Horvath MD  2023    PROGRESS NOTE    HISTORY OF PRESENT ILLNESS    21  C/o low back pain radiating into the RLE. She reports numbness and tingling in her hands and feet. She has been seen by Dr. Mercy Moss who  offered a surgical option. She was seen in the ED 21 with c/o intermittent low back pain x several weeks. Pt notes that she had a work injury  in 2018 when a chair broke and she fell backwards onto concrete. She attended PT with no benefit. She notes that Dr. Mercy Moss provided L4-5 CONNIE, cervical ESIs  over the time span of a year () with temporary benefit. Lumbar spine MRI dated 21 reviewed. Per report, Mild lateral recess stenosis  at L4-5. Elsewhere, no high-grade central canal stenosis at any lumbar level. Degenerative changes. She previously saw SOLO Kitchen (and Dr. Marleni Trejo) for pain management,  who offered SI joint injections. PLAN: Referral to Dr. Simin Winters (Fibromyalgia management, pain psychology), Referral to Dr. Elli Thompson (Pain psychology)     10/4/23  C/o new symptom of neck pain radiating down to her left hand, most prominent in her 2nd, 3rd, and 4th digits. She also notes of a cramping sensation of her fingers of her left hand. She was seen by ER on 23 for eval of left neck pain radiating down LUE x ~1 year. Pt notes she was rx'd Prednisone steroid pack by ER. Saw Dr. Tamara Zapata on 23, where pt underwent bilateral knee injections. PT (23-23; [facility]) attended for lumbar pain. TENS unit at PT provided significant relief. Notes she is starting PT for her cervical pain this week. She currently takes Gabapentin 100 mg TID. PLAN: increase Gabapentin 300 mg TID, DME for TENS unit w/ accessories      Abdi Chávez is a 46 y.o. female was seen today for follow up. ***    Pain Score: ***/10     PmHx: Bipolar I, PTSD, anxiety     reviewed.     REVIEW OF SYSTEMS  Review of Systems
canal stenosis Modeate left and mild right foraminal stenosis due to facet arthropathy and uncovertebral degenerative changes. This has mildly progressed since previous study,   C6-7: Disc osteophyte complex. Mild central canal stenosis. Mild left foraminal stenosis due to facet arthropathy and uncovertebral degenerative change. Findings have mildly progressed since previous study. C7-T1: Mild facet arthropathy. Mild bilateral foraminal stenosis. No high-grade central canal stenosis. Degenerative changes have mildly progressed since prior study. IMPRESSION:   1. Mild central canal stenosis at C4-5, C5-6, and C6-7.   2. Multilevel degenerative changes and foraminal stenoses. 3.Tiny focal syrinx involving the cervical spinal cord at C6-7. This appears unchanged. 25 minutes of face-to-face contact were spent with the patient during today's visit extensively discussing symptoms and treatment plan. All questions were answered. More than half of this visit today was spent on counseling. Written by Félix Chavarria, as dictated by Dr. Cristina Alonzo.

## 2023-11-15 NOTE — PROGRESS NOTES
PHYSICAL / OCCUPATIONAL THERAPY - DAILY TREATMENT NOTE (updated )    Patient Name: Patricia Lebron    Date: 11/15/2023    : 1970  Insurance: Payor: [employer] MEDICARE COMMUNITY PL / Plan: [facility] COMM DUAL COMP HMO SNP / Product Type: *No Product type* /      Patient  verified Yes     Visit #   Current / Total 7 24   Time   In / Out 1241 1:11    Pain   In / Out 6/10 6/10   Subjective Functional Status/Changes:  No changes since last session. She reports being late due to coming straight from another appointment. Changes to: Allergies, Med Hx, Sx Hx?   no       TREATMENT AREA =  Cervicalgia [M54.2]  Right shoulder pain [M25.511]  Left shoulder pain [M25.512]  Other low back pain [M54.59]    OBJECTIVE  Therapeutic Procedures: Tx Min Billable or 1:1 Min (if diff from Tx Min) Procedure, Rationale, Specifics   10  61167 Therapeutic Exercise (timed):  increase ROM, strength, coordination, balance, and proprioception to improve patient's ability to progress to PLOF and address remaining functional goals. (see flow sheet as applicable)    Details if applicable:       10  37665 Neuromuscular Re-Education (timed):  improve balance, coordination, kinesthetic sense, posture, core stability and proprioception to improve patient's ability to develop conscious control of individual muscles and awareness of position of extremities in order to progress to PLOF and address remaining functional goals. (see flow sheet as applicable)    Details if applicable:     10  67739 Therapeutic Activity (timed):  use of dynamic activities replicating functional movements to increase ROM, strength, coordination, balance, and proprioception in order to improve patient's ability to progress to PLOF and address remaining functional goals.   (see flow sheet as applicable)     Details if applicable:     30  [facility] Totals Reminder: bill using total billable min of TIMED therapeutic procedures (example: do not include dry needle or estim

## 2023-11-17 ENCOUNTER — APPOINTMENT (OUTPATIENT)
Facility: HOSPITAL | Age: 53
End: 2023-11-17
Payer: MEDICAID

## 2023-11-20 ENCOUNTER — OFFICE VISIT (OUTPATIENT)
Age: 53
End: 2023-11-20
Payer: MEDICAID

## 2023-11-20 ENCOUNTER — HOSPITAL ENCOUNTER (OUTPATIENT)
Facility: HOSPITAL | Age: 53
Setting detail: RECURRING SERIES
Discharge: HOME | End: 2023-11-23
Payer: MEDICAID

## 2023-11-20 VITALS — TEMPERATURE: 97.3 F | BODY MASS INDEX: 31.53 KG/M2 | WEIGHT: 167 LBS | HEIGHT: 61 IN

## 2023-11-20 DIAGNOSIS — M17.11 UNILATERAL PRIMARY OSTEOARTHRITIS, RIGHT KNEE: ICD-10-CM

## 2023-11-20 DIAGNOSIS — M17.12 UNILATERAL PRIMARY OSTEOARTHRITIS, LEFT KNEE: Primary | ICD-10-CM

## 2023-11-20 PROCEDURE — 97110 THERAPEUTIC EXERCISES: CPT

## 2023-11-20 PROCEDURE — 99213 OFFICE O/P EST LOW 20 MIN: CPT | Performed by: SPECIALIST

## 2023-11-20 PROCEDURE — 97112 NEUROMUSCULAR REEDUCATION: CPT

## 2023-11-20 NOTE — PROGRESS NOTES
PHYSICAL / OCCUPATIONAL THERAPY - DAILY TREATMENT NOTE (updated )    Patient Name: Esmer Yanes    Date: 2023    : 1970  Insurance: Payor: [employer]IC[employer] COMMUN[employer] PL / Plan: [facility] COMM DUAL COMP HMO SNP / Product Type: *No Product type* /      Patient  verified Yes     Visit #   Current / Total 8 24   Time   In / Out 12:40 1:12   Pain   In / Out C/S 5, L/S 7 C/S 5, L/S 7   Subjective Functional Status/Changes: Pt 10' late to appointment. Changes to: Allergies, Med Hx, Sx Hx?   no       TREATMENT AREA =  Cervicalgia [M54.2]  Right shoulder pain [M25.511]  Left shoulder pain [M25.512]  Other low back pain [M54.59]    OBJECTIVE    Therapeutic Procedures: Tx Min Billable or 1:1 Min (if diff from Tx Min) Procedure, Rationale, Specifics   22  26659 Therapeutic Exercise (timed):  increase ROM, strength, coordination, balance, and proprioception to improve patient's ability to progress to PLOF and address remaining functional goals. (see flow sheet as applicable)    Details if applicable:       10  16272 Neuromuscular Re-Education (timed):  improve balance, coordination, kinesthetic sense, posture, core stability and proprioception to improve patient's ability to develop conscious control of individual muscles and awareness of position of extremities in order to progress to PLOF and address remaining functional goals.  (see flow sheet as applicable)    Details if applicable:  Trapeze bar series, hook-lying stabs,   32  [facility] Totals Reminder: bill using total billable min of TIMED therapeutic procedures (example: do not include dry needle or estim unattended, both untimed codes, in totals to left)  8-22 min = 1 unit; 23-37 min = 2 units; 38-52 min = 3 units; 53-67 min = 4 units; 68-82 min = 5 units   Total Total     TOTAL TREATMENT TIME:        32     [x]  Patient Education billed concurrently with other procedures   [x] Review HEP    [] Progressed/Changed HEP, detail:    [] Other detail:

## 2023-11-30 ENCOUNTER — HOSPITAL ENCOUNTER (OUTPATIENT)
Facility: HOSPITAL | Age: 53
Discharge: HOME | End: 2023-11-30
Attending: PHYSICAL MEDICINE & REHABILITATION
Payer: MEDICAID

## 2023-11-30 DIAGNOSIS — R20.0 NUMBNESS AND TINGLING OF LEFT UPPER EXTREMITY: ICD-10-CM

## 2023-11-30 DIAGNOSIS — M54.12 CERVICAL RADICULOPATHY: ICD-10-CM

## 2023-11-30 DIAGNOSIS — R20.2 NUMBNESS AND TINGLING OF LEFT UPPER EXTREMITY: ICD-10-CM

## 2023-11-30 PROCEDURE — 72141 MRI NECK SPINE W/O DYE: CPT

## 2023-12-01 ENCOUNTER — HOSPITAL ENCOUNTER (OUTPATIENT)
Facility: HOSPITAL | Age: 53
Setting detail: RECURRING SERIES
Discharge: HOME | End: 2023-12-04
Payer: MEDICAID

## 2023-12-01 PROCEDURE — 97112 NEUROMUSCULAR REEDUCATION: CPT

## 2023-12-01 PROCEDURE — 97140 MANUAL THERAPY 1/> REGIONS: CPT

## 2023-12-01 PROCEDURE — 97110 THERAPEUTIC EXERCISES: CPT

## 2023-12-01 NOTE — PROGRESS NOTES
[address] PHYSICAL THERAPY  [address] [facility] - Ph: [phone]   Fx: [phone]    PHYSICAL THERAPY PROGRESS NOTE      Patient name: Gi Galo Start of Care: 2023   Referral source: Marquise Holland* : 1970               Medical Diagnosis: Cervicalgia [M54.2]  Right shoulder pain [M25.511]  Left shoulder pain [M25.512]  Other low back pain [M54.59]        Onset Date:2023               Treatment Diagnosis: M54.2  NECK PAIN and M54.59  OTHER LOWER BACK PAIN                                     Prior Hospitalization: see medical history Provider#: [id number]   Medications: Verified on Patient Summary List      Comorbidities: FMS; +tobacco; anxiety; PTSD  Prior Level of Function: Able to perform daily activities prior to onset; sedentary lifestyle      Visits from Start of Care: 9    Missed Visits: 2    Goals/Measure of Progress: To be achieved in 3 weeks:    Short Term Goals: To be accomplished in 1 weeks  1. I and compliant with HEP for self management of symptoms. IE:issued HEP  PN: Met, pt reports compliance. 2. Increase L/S flexion to finger tips to toes to increase ease with ADLs. IE:2\" from toes  PN: 2\" no change    Long Term Goals: To be accomplished in 3 weeks  1. Improve neck FOTO to 46 to indicate improved function with daily activities. IE:31  PN: 24   2. Improve back FOTO to 35 to indicate improved function with daily activities. IE:19  PN: 25  3. Increase B hip abd/ext strength to 5/5 to improve stability for negotiating stairs. IE: B abd 4/5; B  ext 4-/5  PN: 4/5 to 4+/5  4. Increase B middle strap strength to 4+/5 to improve stability for using a vacuum . IE:right 3+/5; left 3/5  PN: 4-/5 to 3+/5   5. Increase C/S AROM by 5 deg in all directions to increase ease with turning to look behind while driving.    IE:27 flex; 28 ext; 19 right lat flex; 11 left lat flex; 58 right rot; 50 left

## 2023-12-05 ENCOUNTER — HOSPITAL ENCOUNTER (OUTPATIENT)
Facility: HOSPITAL | Age: 53
Discharge: HOME | End: 2023-12-08
Payer: MEDICAID

## 2023-12-05 PROCEDURE — 94726 PLETHYSMOGRAPHY LUNG VOLUMES: CPT

## 2023-12-05 PROCEDURE — 94010 BREATHING CAPACITY TEST: CPT

## 2023-12-05 PROCEDURE — 94729 DIFFUSING CAPACITY: CPT

## 2023-12-07 PROBLEM — R06.02 SOB (SHORTNESS OF BREATH): Status: ACTIVE | Noted: 2023-12-07

## 2023-12-07 LAB
DLCO: NORMAL
FEV1/FVC: NORMAL
FEV1: NORMAL
FVC: NORMAL
TLC: NORMAL

## 2023-12-07 PROCEDURE — 94010 BREATHING CAPACITY TEST: CPT | Performed by: INTERNAL MEDICINE

## 2023-12-07 PROCEDURE — 94726 PLETHYSMOGRAPHY LUNG VOLUMES: CPT | Performed by: INTERNAL MEDICINE

## 2023-12-07 PROCEDURE — 94729 DIFFUSING CAPACITY: CPT | Performed by: INTERNAL MEDICINE

## 2023-12-08 ENCOUNTER — APPOINTMENT (OUTPATIENT)
Facility: HOSPITAL | Age: 53
End: 2023-12-08
Payer: MEDICAID

## 2023-12-08 ENCOUNTER — TELEPHONE (OUTPATIENT)
Facility: HOSPITAL | Age: 53
End: 2023-12-08

## 2023-12-11 ENCOUNTER — APPOINTMENT (OUTPATIENT)
Facility: HOSPITAL | Age: 53
End: 2023-12-11
Payer: MEDICAID

## 2023-12-11 ENCOUNTER — HOSPITAL ENCOUNTER (OUTPATIENT)
Facility: HOSPITAL | Age: 53
Setting detail: RECURRING SERIES
Discharge: HOME | End: 2023-12-14
Payer: MEDICAID

## 2023-12-11 PROCEDURE — 97140 MANUAL THERAPY 1/> REGIONS: CPT

## 2023-12-11 PROCEDURE — 97110 THERAPEUTIC EXERCISES: CPT

## 2023-12-11 PROCEDURE — 97112 NEUROMUSCULAR REEDUCATION: CPT

## 2023-12-11 NOTE — PROGRESS NOTES
to 46 to indicate improved function with daily activities. IE:31  PN: 24   2. Improve back FOTO to 35 to indicate improved function with daily activities. IE:19  PN: 25  3. Increase B hip abd/ext strength to 5/5 to improve stability for negotiating stairs. IE: B abd 4/5; B  ext 4-/5  PN: 4/5 to 4+/5  4. Increase B middle strap strength to 4+/5 to improve stability for using a vacuum . IE:right 3+/5; left 3/5  PN: 4-/5 to 3+/5   5. Increase C/S AROM by 5 deg in all directions to increase ease with turning to look behind while driving.    IE:27 flex; 28 ext; 19 right lat flex; 11 left lat flex; 58 right rot; 50 left rot  PN: flex 40 deg; 42 ext; 25 right lat flex; 15 left lat flex; B rot 45    PLAN  Yes  Continue plan of care  []  Upgrade activities as tolerated  []  Discharge due to :  []  Other:    Cheng Hernandez PTA    12/11/2023    1:51 PM    Future Appointments   Date Time Provider [address] [place] [address]   12/20/2023 11:10 AM Nikolai Perez, PT [facility]   12/22/2023 12:30 PM Cheng [facility]   1/4/2024 10:00 AM Jamal More MD [facility]   1/8/2024 11:30 AM Luis Sanchez MD [facility]   1/24/2024 11:15 AM Carlos Kahn MD [facility] AM[facility]   2/8/2024  9:30 AM [facility]   2/8/2024 10:30 AM Vaishali Griggs MD [facility]

## 2023-12-14 ENCOUNTER — APPOINTMENT (OUTPATIENT)
Facility: HOSPITAL | Age: 53
End: 2023-12-14
Payer: MEDICAID

## 2023-12-18 ENCOUNTER — APPOINTMENT (OUTPATIENT)
Facility: HOSPITAL | Age: 53
End: 2023-12-18
Payer: MEDICAID

## 2023-12-20 ENCOUNTER — APPOINTMENT (OUTPATIENT)
Facility: HOSPITAL | Age: 53
End: 2023-12-20
Payer: MEDICAID

## 2023-12-22 ENCOUNTER — HOSPITAL ENCOUNTER (OUTPATIENT)
Facility: HOSPITAL | Age: 53
Setting detail: RECURRING SERIES
Discharge: HOME | End: 2023-12-25
Payer: MEDICAID

## 2023-12-22 PROCEDURE — 97140 MANUAL THERAPY 1/> REGIONS: CPT

## 2023-12-22 PROCEDURE — 97112 NEUROMUSCULAR REEDUCATION: CPT

## 2023-12-22 PROCEDURE — 97110 THERAPEUTIC EXERCISES: CPT

## 2023-12-22 NOTE — PROGRESS NOTES
[facility]  [address] [address] Jai Faulkner [address]  [facility]  [phone] fax    Physical Therapy Discharge Summary  Patient name: Josafat Lyle Start of Care: 2023   Referral source: Andressa Covarrubias* : 1970   Medical/Treatment Diagnosis: Cervicalgia [M54.2]  Right shoulder pain [M25.511]  Left shoulder pain [M25.512]  Other low back pain [M54.59]  Payor: [facility] MEDICARE COMMUNITY PL / Plan: [employer] COMM DUAL COMP HMO SNP / Product Type: *No Product type* /  Onset Date:2023     Prior Hospitalization: see medical history Provider#: [id number]   Medications: Verified on Patient Summary List    Comorbidities: FMS; +tobacco; anxiety; PTSD   Prior Level of Function: Able to perform daily activities prior to onset; sedentary lifestyle     Visits from Start of Care: 12    Missed Visits: 3    Reporting Period : 2023 to 2023    Goals/Measure of Progress:  Short Term Goals: To be accomplished in 1 weeks  1. I and compliant with HEP for self management of symptoms. IE:issued HEP  PN: Met, pt reports compliance. 2. Increase L/S flexion to finger tips to toes to increase ease with ADLs. IE:2\" from toes  PN: 2\" no change  Current: 1\" from toes. Long Term Goals: To be accomplished in 3 weeks  1. Improve neck FOTO to 46 to indicate improved function with daily activities. IE:31  PN: 24  Current: 28%. 2. Improve back FOTO to 35 to indicate improved function with daily activities. IE:19  PN: 25  Current: 23%. 3. Increase B hip abd/ext strength to 5/5 to improve stability for negotiating stairs. IE: B abd 4/5; B  ext 4-/5  PN: 4/5 to 4+/5  Current: hip abd Left 4/5, Right 4-/5, hip ext Left 4-/5, Right 4/5.  4. Increase B middle strap strength to 4+/5 to improve stability for using a vacuum . IE:right 3+/5; left 3/5  PN: 4-/5 to 3+/5   Current: middle trap Left 3/5, Right 4-/5. 5. Increase C/S AROM by 5 deg in all
bill using total billable min of TIMED therapeutic procedures (example: do not include dry needle or estim unattended, both untimed codes, in totals to left)  8-22 min = 1 unit; 23-37 min = 2 units; 38-52 min = 3 units; 53-67 min = 4 units; 68-82 min = 5 units   Total Total     TOTAL TREATMENT TIME:        46     [x]  Patient Education billed concurrently with other procedures   [x] Review HEP    [] Progressed/Changed HEP, detail:    [] Other detail:       Objective Information/Functional Measures/Assessment    FOTO C/S 28%, L/S 23%. L/S flexion finger tips to 1 inch from toes. MMT middle trap Left 3/5, Right 4-/5, hip abd Left 4/5, Right 4-/5, hip ext Left 4-/5, Right 4/5. AROM C/S flex 37, ext 40, Right lateral flex 30, Left lateral flex 30, Left Rotation 45, Right Rotation 60 degrees. Pt reports neck and back have improved a little but the \"lower lumbar is the worst.\"    Pt has made progress towards set goals. Continues to express pins/needles in Left UE. Pt would benefit continuing PT to decrease Left UT tension and improve scapulothoracic stability/strength to improve Left UE symptoms however pt wishes to D/C PT at this time so that she can start PT on (B) knees. Pt given updated HEP and blue t-band for home use. Pt had no further questions and thanked staff for services provided. Progress toward goals / Updated goals:  []  See Progress Note/Recertification    Short Term Goals: To be accomplished in 1 weeks  1. I and compliant with HEP for self management of symptoms. IE:issued HEP  PN: Met, pt reports compliance. 2. Increase L/S flexion to finger tips to toes to increase ease with ADLs. IE:2\" from toes  PN: 2\" no change  Current: 1\" from toes. 12/22/2023     Long Term Goals: To be accomplished in 3 weeks  1. Improve neck FOTO to 46 to indicate improved function with daily activities. IE:31  PN: 24  Current: 28%. 12/22/2023  2.  Improve back FOTO to 35 to indicate improved function with daily

## 2024-01-04 ENCOUNTER — OFFICE VISIT (OUTPATIENT)
Age: 54
End: 2024-01-04
Payer: MEDICARE

## 2024-01-04 VITALS
SYSTOLIC BLOOD PRESSURE: 103 MMHG | WEIGHT: 165 LBS | DIASTOLIC BLOOD PRESSURE: 63 MMHG | HEIGHT: 61 IN | BODY MASS INDEX: 31.15 KG/M2 | RESPIRATION RATE: 16 BRPM | HEART RATE: 75 BPM

## 2024-01-04 DIAGNOSIS — G89.29 CHRONIC PRIMARY MUSCULOSKELETAL PAIN: ICD-10-CM

## 2024-01-04 DIAGNOSIS — R20.2 NUMBNESS AND TINGLING OF LEFT UPPER EXTREMITY: ICD-10-CM

## 2024-01-04 DIAGNOSIS — M79.18 CHRONIC PRIMARY MUSCULOSKELETAL PAIN: ICD-10-CM

## 2024-01-04 DIAGNOSIS — F40.240 CLAUSTROPHOBIA: ICD-10-CM

## 2024-01-04 DIAGNOSIS — R20.0 NUMBNESS AND TINGLING OF LEFT UPPER EXTREMITY: ICD-10-CM

## 2024-01-04 DIAGNOSIS — M48.02 CERVICAL SPINAL STENOSIS: Primary | ICD-10-CM

## 2024-01-04 DIAGNOSIS — M54.12 CERVICAL RADICULOPATHY: ICD-10-CM

## 2024-01-04 DIAGNOSIS — M54.50 LUMBAR PAIN: ICD-10-CM

## 2024-01-04 DIAGNOSIS — M79.18 CERVICAL MYOFASCIAL PAIN SYNDROME: ICD-10-CM

## 2024-01-04 PROCEDURE — 3017F COLORECTAL CA SCREEN DOC REV: CPT | Performed by: PHYSICAL MEDICINE & REHABILITATION

## 2024-01-04 PROCEDURE — 4004F PT TOBACCO SCREEN RCVD TLK: CPT | Performed by: PHYSICAL MEDICINE & REHABILITATION

## 2024-01-04 PROCEDURE — 99214 OFFICE O/P EST MOD 30 MIN: CPT | Performed by: PHYSICAL MEDICINE & REHABILITATION

## 2024-01-04 PROCEDURE — G8427 DOCREV CUR MEDS BY ELIG CLIN: HCPCS | Performed by: PHYSICAL MEDICINE & REHABILITATION

## 2024-01-04 PROCEDURE — G8484 FLU IMMUNIZE NO ADMIN: HCPCS | Performed by: PHYSICAL MEDICINE & REHABILITATION

## 2024-01-04 PROCEDURE — G8417 CALC BMI ABV UP PARAM F/U: HCPCS | Performed by: PHYSICAL MEDICINE & REHABILITATION

## 2024-01-04 RX ORDER — DIAZEPAM 5 MG/1
TABLET ORAL
Qty: 2 TABLET | Refills: 0 | Status: SHIPPED | OUTPATIENT
Start: 2024-01-04 | End: 2024-02-04

## 2024-01-04 NOTE — PROGRESS NOTES
[facility]  [facility], [address]  [place] [address]  Phone: [phone]  Fax: [phone]      Diana Holguin  : 1970  PCP: Todd Lind MD  2024    PROGRESS NOTE    HISTORY OF PRESENT ILLNESS    21  C/o low back pain radiating into the RLE. She reports numbness and tingling in her hands and feet. She has been seen by Dr. Bennett who  offered a surgical option. She was seen in the ED 21 with c/o intermittent low back pain x several weeks. Pt notes that she had a work injury  in 2018 when a chair broke and she fell backwards onto concrete. She attended PT with no benefit. She notes that Dr. Bennett provided L4-5 CONNIE, cervical ESIs  over the time span of a year () with temporary benefit.   Lumbar spine MRI dated 21 reviewed. Per report, Mild lateral recess stenosis  at L4-5. Elsewhere, no high-grade central canal stenosis at any lumbar level. Degenerative changes. She previously saw SOLO Araiza (and Dr. Ricks) for pain management,  who offered SI joint injections.  PLAN: Referral to Dr. Puga (Fibromyalgia management, pain psychology), Referral to Dr. Arteaga (Pain psychology)     10/4/23  C/o new symptom of neck pain radiating down to her left hand, most prominent in her 2nd, 3rd, and 4th digits. She also notes of a cramping sensation of her fingers of her left hand.   She was seen by ER on 23 for eval of left neck pain radiating down LUE x ~1 year.   Pt notes she was rx'd Prednisone steroid pack by ER.    Saw Dr. Georges on 23, where pt underwent bilateral knee injections.   PT (23-23; [facility]) attended for lumbar pain. TENS unit at PT provided significant relief. Notes she is starting PT for her cervical pain this week.   She currently takes Gabapentin 100 mg TID.  PLAN: increase Gabapentin 300 mg TID, DME for TENS unit w/ accessories    11/15/23  C/o neck pain that radiates to her left shoulder; notes 
provides significant benefit, especially to left hand numbness and tingling   PLAN: Cervical MRI, referral to Dr. Davis (left shoulder pain), increase gabapentin to 2 x 300 mg TID      Diana Holguin is a 53 y.o. female was seen today for follow up. *** She continues with PT (11/15/23-12/20/23; [facility]). She continues with Gabapentin 2 x 300 mg TID. Cervical MRI dated 11/30/23 was reviewed. Per report, Multilevel degenerative changes in the cervical spine as above, most pronounced at C5-C6 where there is mild-to-moderate spinal canal narrowing, severe left and moderate right foraminal narrowing. Tiny T2 bright signal abnormality within the cervical spinal cord most pronounced at the level of C6-C7 (1mm x 1mm in axial dimension), possibly representing a tiny syrinx.    Pain Score: ***/10     PmHx: Bipolar I, PTSD, anxiety      reviewed.    REVIEW OF SYSTEMS  Review of Systems   ***    SPINE/MUSCULOSKELETAL EXAM  Ortho Exam ***    MOTOR:      Elbow Flex  Elbow Ext Arm Abd Wrist Ext Wrist Flex Hand Intrin   Right 5/5 5/5 5/5 5/5 5/5 5/5   Left 5/5 5/5 5/5 5/5 5/5 5/5             Hip flex  Knee Ext EHL Ankle DF Ankle PF      Right 5/5 5/5 5/5 5/5 5/5    Left 5/5 5/5 5/5 5/5 5/5      Ambulation {Blank Single Select Template:20061::\"without assistive device. FWB.\",\"with single point cane. FWB.\",\"with three point cane. FWB.\",\"with quad cane. FWB.\",\"with walker. FWB.\",\"with Rollator walker. FWB.\",\"with crutches. FWB.\",\"in a wheelchair.\"}      ASSESSMENT  Diana Holguin is a 53 y.o. female with ***      PLAN  ***          There are no diagnoses linked to this encounter.           PAST MEDICAL HISTORY   Past Medical History:   Diagnosis Date    Anxiety     Cigarette smoker     Dyspnea on exertion 08/2011    Negative cardiac and pulmonary workup    Heart murmur     Heart murmur     History of gestational diabetes     Musculoskeletal disorder     DDD    Neurological disorder     migraines    Ovarian cyst        Past

## 2024-01-07 ENCOUNTER — HOSPITAL ENCOUNTER (EMERGENCY)
Facility: HOSPITAL | Age: 54
Discharge: HOME | End: 2024-01-07
Attending: EMERGENCY MEDICINE
Payer: MEDICAID

## 2024-01-07 VITALS
WEIGHT: 165 LBS | SYSTOLIC BLOOD PRESSURE: 132 MMHG | RESPIRATION RATE: 12 BRPM | OXYGEN SATURATION: 98 % | BODY MASS INDEX: 31.15 KG/M2 | HEIGHT: 61 IN | DIASTOLIC BLOOD PRESSURE: 73 MMHG | HEART RATE: 78 BPM | TEMPERATURE: 98.4 F

## 2024-01-07 DIAGNOSIS — F31.9 BIPOLAR 1 DISORDER (HCC): ICD-10-CM

## 2024-01-07 DIAGNOSIS — F41.9 ANXIETY: ICD-10-CM

## 2024-01-07 DIAGNOSIS — R55 SYNCOPE, UNSPECIFIED SYNCOPE TYPE: Primary | ICD-10-CM

## 2024-01-07 DIAGNOSIS — N39.0 ACUTE UTI: ICD-10-CM

## 2024-01-07 LAB
ALBUMIN SERPL-MCNC: 3.5 G/DL (ref 3.4–5)
ALBUMIN/GLOB SERPL: 0.8 (ref 0.8–1.7)
ALP SERPL-CCNC: 143 U/L (ref 45–117)
ALT SERPL-CCNC: 26 U/L (ref 13–56)
AMPHET UR QL SCN: NEGATIVE
ANION GAP SERPL CALC-SCNC: 4 MMOL/L (ref 3–18)
APPEARANCE UR: ABNORMAL
AST SERPL-CCNC: 19 U/L (ref 10–38)
BACTERIA URNS QL MICRO: ABNORMAL /HPF
BARBITURATES UR QL SCN: NEGATIVE
BASOPHILS # BLD: 0 K/UL (ref 0–0.1)
BASOPHILS NFR BLD: 1 % (ref 0–2)
BENZODIAZ UR QL: NEGATIVE
BILIRUB SERPL-MCNC: 0.4 MG/DL (ref 0.2–1)
BILIRUB UR QL: NEGATIVE
BUN SERPL-MCNC: 7 MG/DL (ref 7–18)
BUN/CREAT SERPL: 9 (ref 12–20)
CALCIUM SERPL-MCNC: 9.4 MG/DL (ref 8.5–10.1)
CANNABINOIDS UR QL SCN: NEGATIVE
CHLORIDE SERPL-SCNC: 112 MMOL/L (ref 100–111)
CO2 SERPL-SCNC: 27 MMOL/L (ref 21–32)
COCAINE UR QL SCN: NEGATIVE
COLOR UR: YELLOW
CREAT SERPL-MCNC: 0.82 MG/DL (ref 0.6–1.3)
DIFFERENTIAL METHOD BLD: ABNORMAL
EOSINOPHIL # BLD: 0 K/UL (ref 0–0.4)
EOSINOPHIL NFR BLD: 1 % (ref 0–5)
EPITH CASTS URNS QL MICRO: ABNORMAL /LPF (ref 0–5)
ERYTHROCYTE [DISTWIDTH] IN BLOOD BY AUTOMATED COUNT: 15.8 % (ref 11.6–14.5)
ETHANOL SERPL-MCNC: <3 MG/DL (ref 0–3)
GLOBULIN SER CALC-MCNC: 4.4 G/DL (ref 2–4)
GLUCOSE SERPL-MCNC: 103 MG/DL (ref 74–99)
GLUCOSE UR STRIP.AUTO-MCNC: NEGATIVE MG/DL
HCT VFR BLD AUTO: 36.2 % (ref 35–45)
HGB BLD-MCNC: 11.4 G/DL (ref 12–16)
HGB UR QL STRIP: ABNORMAL
IMM GRANULOCYTES # BLD AUTO: 0 K/UL (ref 0–0.04)
IMM GRANULOCYTES NFR BLD AUTO: 0 % (ref 0–0.5)
KETONES UR QL STRIP.AUTO: NEGATIVE MG/DL
LEUKOCYTE ESTERASE UR QL STRIP.AUTO: ABNORMAL
LYMPHOCYTES # BLD: 1.8 K/UL (ref 0.9–3.6)
LYMPHOCYTES NFR BLD: 26 % (ref 21–52)
Lab: NORMAL
MCH RBC QN AUTO: 25.4 PG (ref 24–34)
MCHC RBC AUTO-ENTMCNC: 31.5 G/DL (ref 31–37)
MCV RBC AUTO: 80.8 FL (ref 78–100)
METHADONE UR QL: NEGATIVE
MONOCYTES # BLD: 0.4 K/UL (ref 0.05–1.2)
MONOCYTES NFR BLD: 6 % (ref 3–10)
NEUTS SEG # BLD: 4.5 K/UL (ref 1.8–8)
NEUTS SEG NFR BLD: 66 % (ref 40–73)
NITRITE UR QL STRIP.AUTO: NEGATIVE
NRBC # BLD: 0 K/UL (ref 0–0.01)
NRBC BLD-RTO: 0 PER 100 WBC
OPIATES UR QL: NEGATIVE
PCP UR QL: NEGATIVE
PH UR STRIP: 7 (ref 5–8)
PLATELET # BLD AUTO: 280 K/UL (ref 135–420)
PMV BLD AUTO: 10.3 FL (ref 9.2–11.8)
POTASSIUM SERPL-SCNC: 3.8 MMOL/L (ref 3.5–5.5)
PROT SERPL-MCNC: 7.9 G/DL (ref 6.4–8.2)
PROT UR STRIP-MCNC: 30 MG/DL
RBC # BLD AUTO: 4.48 M/UL (ref 4.2–5.3)
RBC #/AREA URNS HPF: ABNORMAL /HPF (ref 0–5)
SODIUM SERPL-SCNC: 143 MMOL/L (ref 136–145)
SP GR UR REFRACTOMETRY: <1.005 (ref 1–1.03)
TROPONIN I SERPL HS-MCNC: 6 NG/L (ref 0–54)
UROBILINOGEN UR QL STRIP.AUTO: 0.2 EU/DL (ref 0.2–1)
WBC # BLD AUTO: 6.8 K/UL (ref 4.6–13.2)
WBC URNS QL MICRO: ABNORMAL /HPF (ref 0–4)

## 2024-01-07 PROCEDURE — 6360000002 HC RX W HCPCS: Performed by: EMERGENCY MEDICINE

## 2024-01-07 PROCEDURE — 96374 THER/PROPH/DIAG INJ IV PUSH: CPT

## 2024-01-07 PROCEDURE — 87086 URINE CULTURE/COLONY COUNT: CPT

## 2024-01-07 PROCEDURE — 93005 ELECTROCARDIOGRAM TRACING: CPT | Performed by: EMERGENCY MEDICINE

## 2024-01-07 PROCEDURE — 85025 COMPLETE CBC W/AUTO DIFF WBC: CPT

## 2024-01-07 PROCEDURE — 82077 ASSAY SPEC XCP UR&BREATH IA: CPT

## 2024-01-07 PROCEDURE — 81001 URINALYSIS AUTO W/SCOPE: CPT

## 2024-01-07 PROCEDURE — 99284 EMERGENCY DEPT VISIT MOD MDM: CPT

## 2024-01-07 PROCEDURE — 2580000003 HC RX 258: Performed by: EMERGENCY MEDICINE

## 2024-01-07 PROCEDURE — 96375 TX/PRO/DX INJ NEW DRUG ADDON: CPT

## 2024-01-07 PROCEDURE — 84484 ASSAY OF TROPONIN QUANT: CPT

## 2024-01-07 PROCEDURE — 80053 COMPREHEN METABOLIC PANEL: CPT

## 2024-01-07 PROCEDURE — 80307 DRUG TEST PRSMV CHEM ANLYZR: CPT

## 2024-01-07 RX ORDER — DIPHENHYDRAMINE HYDROCHLORIDE 50 MG/ML
50 INJECTION INTRAMUSCULAR; INTRAVENOUS
Status: COMPLETED | OUTPATIENT
Start: 2024-01-07 | End: 2024-01-07

## 2024-01-07 RX ORDER — LORAZEPAM 2 MG/ML
2 INJECTION INTRAMUSCULAR ONCE
Status: COMPLETED | OUTPATIENT
Start: 2024-01-07 | End: 2024-01-07

## 2024-01-07 RX ORDER — CEPHALEXIN 500 MG/1
500 CAPSULE ORAL 4 TIMES DAILY
Qty: 28 CAPSULE | Refills: 0 | Status: SHIPPED | OUTPATIENT
Start: 2024-01-07

## 2024-01-07 RX ADMIN — LORAZEPAM 2 MG: 2 INJECTION, SOLUTION INTRAMUSCULAR; INTRAVENOUS at 16:40

## 2024-01-07 RX ADMIN — WATER 1000 MG: 1 INJECTION INTRAMUSCULAR; INTRAVENOUS; SUBCUTANEOUS at 18:08

## 2024-01-07 RX ADMIN — DIPHENHYDRAMINE HYDROCHLORIDE 50 MG: 50 INJECTION INTRAMUSCULAR; INTRAVENOUS at 16:40

## 2024-01-07 ASSESSMENT — PAIN SCALES - GENERAL: PAINLEVEL_OUTOF10: 5

## 2024-01-07 ASSESSMENT — PAIN DESCRIPTION - LOCATION: LOCATION: HEAD

## 2024-01-07 ASSESSMENT — PAIN - FUNCTIONAL ASSESSMENT: PAIN_FUNCTIONAL_ASSESSMENT: 0-10

## 2024-01-07 ASSESSMENT — LIFESTYLE VARIABLES: HOW OFTEN DO YOU HAVE A DRINK CONTAINING ALCOHOL: NEVER

## 2024-01-07 NOTE — ED TRIAGE NOTES
Pt to ED via EMS for eval of syncopal episode in bathroom. Pt reports she remembers feeling anxious, having a really bad headache, prior to LOC. EMS reports family states pt was unresponsive for approximately 1 minute. EMS glucose 121. Pt A/Ox3, answers questions appropriately. Pt with repetitive RUE movement on arrival.

## 2024-01-07 NOTE — ED NOTES
Discharge instructions reviewed with patient.  Patient verbalized understanding.  Patient advised to follow up as directed on discharge instructions.  Patient denies questions, needs or concerns at this time.  Patient verbalized understanding. No s/sx of distress noted.

## 2024-01-07 NOTE — ED PROVIDER NOTES
RBC, UA 4 to 10 0 - 5 /hpf    Epithelial Cells UA 3+ 0 - 5 /lpf    BACTERIA, URINE 2+ (A) NEG /hpf       RADIOLOGIC STUDIES:   Non x-ray images such as CT, Ultrasound and MRI are read by the radiologist. X-ray images are visualized and preliminarily interpreted by the ED Provider with the findings as listed in the ED Course section below.     Interpretation per the Radiologist is listed below, if available at the time of this note:    No orders to display       Procedures     Procedures    ED Course and Medical Decision Making     4:50 PM COLT LILLY (Damir Lizama DO) am the first provider for this patient. Initial assessment performed. I reviewed the vital signs, available nursing notes, past medical history, past surgical history, family history and social history. The patients presenting problems have been discussed, and they are in agreement with the care plan formulated and outlined with them. I have encouraged them to ask questions as they arise throughout their visit.     My differential diagnosis included but was not limited to bipolar disorder, schizophrenia, anxiety              6:55 PM Upon re-evaluation the patient's symptoms have improved. Pt has non-toxic appearance and condition is stable for discharge. She was informed of her results, instructed to f/u with her PCP and return to the ED upon worsening of symptoms. All questions and concerns were addressed.      RECORDS REVIEWED: Nursing Notes, Old Medical Records, Previous EKGs, Ambulance Run Sheet, Previous Radiology Studies, and Previous Laboratory Studies    Is this patient to be included in the SEP-1 core measure? No Exclusion criteria - the patient is NOT to be included for SEP-1 Core Measure due to: Infection is not suspected    MEDICATIONS ADMINISTERED IN THE ED:  Medications   cefTRIAXone (ROCEPHIN) 1,000 mg in sterile water 10 mL IV syringe (1,000 mg IntraVENous Given 1/7/24 1808)   LORazepam (ATIVAN) injection 2 mg (2 mg IntraVENous Given

## 2024-01-08 ENCOUNTER — OFFICE VISIT (OUTPATIENT)
Age: 54
End: 2024-01-08
Payer: MEDICAID

## 2024-01-08 VITALS
HEART RATE: 91 BPM | RESPIRATION RATE: 18 BRPM | WEIGHT: 160.4 LBS | OXYGEN SATURATION: 100 % | DIASTOLIC BLOOD PRESSURE: 72 MMHG | HEIGHT: 61 IN | BODY MASS INDEX: 30.29 KG/M2 | SYSTOLIC BLOOD PRESSURE: 127 MMHG

## 2024-01-08 DIAGNOSIS — M48.02 CERVICAL SPINAL STENOSIS: ICD-10-CM

## 2024-01-08 DIAGNOSIS — M67.912 DISORDER OF LEFT ROTATOR CUFF: Primary | ICD-10-CM

## 2024-01-08 DIAGNOSIS — M54.12 CERVICAL RADICULOPATHY: ICD-10-CM

## 2024-01-08 LAB
EKG ATRIAL RATE: 70 BPM
EKG DIAGNOSIS: NORMAL
EKG P AXIS: 55 DEGREES
EKG P-R INTERVAL: 152 MS
EKG Q-T INTERVAL: 384 MS
EKG QRS DURATION: 86 MS
EKG QTC CALCULATION (BAZETT): 414 MS
EKG R AXIS: 4 DEGREES
EKG T AXIS: 34 DEGREES
EKG VENTRICULAR RATE: 70 BPM

## 2024-01-08 PROCEDURE — 93010 ELECTROCARDIOGRAM REPORT: CPT | Performed by: INTERNAL MEDICINE

## 2024-01-08 PROCEDURE — 99214 OFFICE O/P EST MOD 30 MIN: CPT | Performed by: PHYSICAL MEDICINE & REHABILITATION

## 2024-01-08 NOTE — PROGRESS NOTES
Diana Holguin presents today for   Chief Complaint   Patient presents with    Shoulder Pain     left    Pain       Is someone accompanying this pt? yes    Is the patient using any DME equipment during OV? no    Depression Screening:       No data to display                Learning Assessment:      Abuse Screening:       No data to display                Fall Risk      OPIOID RISK TOOL      Coordination of Care:  1. Have you been to the ER, urgent care clinic since your last visit? Yes blacked out  Hospitalized since your last visit? no    2. Have you seen or consulted any other health care providers outside of the [facility] since your last visit? no Include any pap smears or colon screening. no      
gabapentin (NEURONTIN) 300 MG capsule Take 2 capsules by mouth 3 times daily for 90 days. Intended supply: 30 days Max Daily Amount: 1,800 mg 180 capsule 2    diclofenac (VOLTAREN) 75 MG EC tablet       naproxen (NAPROSYN) 500 MG tablet Take 1 tablet by mouth 2 times daily (with meals) 30 tablet 0    QUEtiapine (SEROQUEL) 300 MG tablet Take 1 tablet by mouth 2 times daily      BACLOFEN PO Take by mouth      methocarbamol (ROBAXIN) 500 MG tablet TAKE 2 (TWO) TABLET BY MOUTH EVERY EIGHT HOURS, AS NEEDED      rizatriptan (MAXALT-MLT) 5 MG disintegrating tablet DISSOLVE 1 TABLET ON THE TONGUE DAILY AS NEEDED      busPIRone (BUSPAR) 10 MG tablet TAKE 2 TABLETS BY MOUTH TWICE A DAY FOR ANXIETY      VITAMIN D, ERGOCALCIFEROL, PO Take by mouth      cyanocobalamin 500 MCG tablet Take 1 tablet by mouth daily      cyclobenzaprine (FLEXERIL) 5 MG tablet Take 2 tablets by mouth 3 times daily      ferrous sulfate (IRON 325) 325 (65 Fe) MG tablet Take by mouth 2 times daily      hydrOXYzine pamoate (VISTARIL) 25 MG capsule Take 1 capsule by mouth      lidocaine (LIDODERM) 5 % Apply patch to the affected area for 12 hours a day and remove for 12 hours a day.      prazosin (MINIPRESS) 2 MG capsule Take 1 mg by mouth      QUEtiapine (SEROQUEL) 50 MG tablet Take 1 tablet by mouth      sertraline (ZOLOFT) 100 MG tablet Take 1 tablet by mouth      tranexamic acid (LYSTEDA) 650 MG TABS tablet       gabapentin (NEURONTIN) 300 MG capsule Take 1 capsule by mouth 3 times daily for 30 days. Intended supply: 30 days Max Daily Amount: 900 mg 90 capsule 2    nicotine (NICODERM CQ) 14 MG/24HR Place 1 patch onto the skin daily 42 patch 0     No current facility-administered medications for this visit.      Allergies:  No Known Allergies       Physical Exam     Vital Signs: /72 (Site: Left Upper Arm, Position: Sitting, Cuff Size: Medium Adult)   Pulse 91   Resp 18   Ht 1.549 m (5' 1\")   Wt 72.8 kg (160 lb 6.4 oz)   LMP  (LMP Unknown)

## 2024-01-09 LAB
BACTERIA SPEC CULT: NORMAL
CC UR VC: NORMAL
SERVICE CMNT-IMP: NORMAL

## 2024-01-12 ENCOUNTER — HOSPITAL ENCOUNTER (OUTPATIENT)
Facility: HOSPITAL | Age: 54
Setting detail: RECURRING SERIES
Discharge: HOME | End: 2024-01-15
Payer: MEDICARE

## 2024-01-12 PROCEDURE — 97162 PT EVAL MOD COMPLEX 30 MIN: CPT

## 2024-01-12 PROCEDURE — 97110 THERAPEUTIC EXERCISES: CPT

## 2024-01-12 PROCEDURE — 97535 SELF CARE MNGMENT TRAINING: CPT

## 2024-01-12 NOTE — PROGRESS NOTES
[facility]  [address] [facility] [address] [place] [address] Ph:[phone] Fx: [phone]    PLAN OF CARE/ Statement of Necessity for Physical Therapy Services           Patient name: Diana Holguin Start of Care: 2024   Referral source: Puneet Georges MD : 1970    Medical Diagnosis: Pain in right knee [M25.561]  Pain in left knee [M25.562]       Onset Date: 2018   Treatment Diagnosis: M25.561  RIGHT KNEE PAIN and M25.562  LEFT KNEE PAIN                                      Prior Hospitalization: see medical history Provider#: [id number]   Medications: Verified on Patient Summary List     Comorbidities: Anxiety, Arthritis, Fibromyalgia, PTSD, LBP   Prior Level of Function: The patient reports pain and difficulty with walking, transfers, and stair negotiation chronically, though has worsened over the last several years.    The Plan of Care and following information is based on the information from the initial evaluation.    Assessment / key information:  The patient is a 53 year old female with a chief complaint of B knee pain, right greater than left. She reports endorses pain with ambulation, stair negotiation, and transfers. She states she did receive a series of injections \"gel\" in bilateral knees with the last one being administered in October, and does report she had some benefit from this. She demonstrates right knee edema > than left with significant crepitus of right patellofemoral joint upon right knee extension. She presents with signs and symptoms that appear consistent with knee OA with impairments consisting of pain, decreased ROM, decreased flexibility, decreased strength, decreased ADL ease, and decreased quality of life. The patient will benefit from skilled PT in order to address the aforementioned impairments.     Evaluation Complexity:  History:  HIGH Complexity :3+ comorbidities / personal factors will impact the outcome/

## 2024-01-12 NOTE — PROGRESS NOTES
PHYSICAL / OCCUPATIONAL THERAPY - DAILY TREATMENT NOTE     Patient Name: Diana Holguin    Date: 2024    : 1970  Insurance: Payor: [employer] MEDICARE COMMUNITY PL / Plan: [facility] COMM DUAL COMP HMO SNP / Product Type: *No Product type* /      Patient  verified Yes     Visit #   Current / Total 1 24   Time   In / Out 12:37 1:10   Pain   In / Out 5/10 5/10   Subjective Functional Status/Changes: The patient has a chief complaint of B knee pain, right more so than left. She endorses pain with walking, transfers, and stair negotiation.   Changes to:  Allergies, Med Hx, Sx Hx?   no       TREATMENT AREA =  Pain in right knee [M25.561]  Pain in left knee [M25.562]    OBJECTIVE  Therapeutic Procedures:  Tx Min Billable or 1:1 Min (if diff from Tx Min) Procedure, Rationale, Specifics   15  81343 Therapeutic Exercise (timed):  increase ROM, strength, coordination, balance, and proprioception to improve patient's ability to progress to PLOF and address remaining functional goals. (see flow sheet as applicable)    Details if applicable:       8  03417 Self Care/Home Management (timed):  improve patient knowledge and understanding of pain reducing techniques, positioning, posture/ergonomics, home safety, activity modification, diagnosis/prognosis, and physical therapy expectations, procedures and progression  to improve patient's ability to progress to PLOF and address remaining functional goals.  (see flow sheet as applicable)    Details if applicable:  education regarding arthritis management, importance of exercise in this management, flexibility, and strength for shock attenuation with ambulation.   23  [facility] Totals Reminder: bill using total billable min of TIMED therapeutic procedures (example: do not include dry needle or estim unattended, both untimed codes, in totals to left)  8-22 min = 1 unit; 23-37 min = 2 units; 38-52 min = 3 units; 53-67 min = 4 units; 68-82 min = 5 units   Total Total     TOTAL

## 2024-01-14 DIAGNOSIS — R20.2 NUMBNESS AND TINGLING OF LEFT UPPER EXTREMITY: ICD-10-CM

## 2024-01-14 DIAGNOSIS — R20.0 NUMBNESS AND TINGLING OF LEFT UPPER EXTREMITY: ICD-10-CM

## 2024-01-14 DIAGNOSIS — M54.12 CERVICAL RADICULOPATHY: ICD-10-CM

## 2024-01-16 ENCOUNTER — HOSPITAL ENCOUNTER (OUTPATIENT)
Facility: HOSPITAL | Age: 54
Discharge: HOME | End: 2024-01-19
Attending: PHYSICAL MEDICINE & REHABILITATION
Payer: MEDICARE

## 2024-01-16 DIAGNOSIS — M54.50 LUMBAR PAIN: ICD-10-CM

## 2024-01-16 PROCEDURE — 72148 MRI LUMBAR SPINE W/O DYE: CPT

## 2024-01-16 RX ORDER — GABAPENTIN 300 MG/1
CAPSULE ORAL
Qty: 180 CAPSULE | Refills: 11 | OUTPATIENT
Start: 2024-01-16

## 2024-01-16 NOTE — PROGRESS NOTES
HISTORY OF PRESENT ILLNESS  Diana Holguin is a 53 y.o. female.    PMH Anxiety, Tobacco Abuse, is presenting in followup  ----  CARDIAC STUDIES  ----  PFTs 12/7/2023  Diffusion:   Abnormal Diffusion Capacity reduced to 69 % predicted Impression:   Reduced diffusion capacity indicating a decrease in alveolar surface area for gas exchange and Isolated reduction of  Functional Residual Capacity   Comment:   See technicians comments. Suggest clinical correlation   ----  Cardiac telemetry 11/20/2023  19 events were transmitted. 6 patient triggered; 13 auto triggered occurred during normal sinus rhythm  Patient monitored for 9d 22h 38m  PACs were not found during the monitoring period  PVCs were not found during the monitoring period  ----  Vascular duplex lower extremity venous bilateral 10/11/2023    No evidence of deep vein thrombosis in the bilateral lower extremities.    Posterior tibial artery Doppler waveforms are multiphasic bilaterally.  ----  EKG 7/20/2022  Normal sinus rhythm  ----  2d tte 4/12/2023    Left Ventricle: Normal left ventricular systolic function with a visually estimated EF of 55 - 60%. EF by 2D Simpsons Biplane is 55%. Left ventricle size is normal. Normal wall thickness. See diagram for wall motion findings. Normal diastolic function.  ----  4/12/2023 MPI    Stress Combined Conclusion: Normal pharmacological myocardial perfusion study.    Stress Function: Left ventricular function post-stress is normal. Post-stress ejection fraction is 64%.    Perfusion Comments: LV perfusion is normal.    Perfusion Conclusion: There is no evidence of transient ischemic dilation (TID).    ECG: Resting ECG demonstrates normal sinus rhythm.    ECG: The ECG was negative for ischemia.Symptoms of chest pain reported during stress test.    Stress Test: A pharmacological stress test was performed using lexiscan. Hemodynamics are adequate for diagnosis. Blood pressure demonstrated a normal response and heart rate

## 2024-01-17 ENCOUNTER — TELEPHONE (OUTPATIENT)
Facility: HOSPITAL | Age: 54
End: 2024-01-17

## 2024-01-23 ENCOUNTER — OFFICE VISIT (OUTPATIENT)
Age: 54
End: 2024-01-23
Payer: MEDICARE

## 2024-01-23 VITALS
SYSTOLIC BLOOD PRESSURE: 121 MMHG | HEIGHT: 61 IN | BODY MASS INDEX: 30.4 KG/M2 | OXYGEN SATURATION: 99 % | DIASTOLIC BLOOD PRESSURE: 76 MMHG | HEART RATE: 98 BPM | WEIGHT: 161 LBS

## 2024-01-23 DIAGNOSIS — R07.9 CHEST PAIN, UNSPECIFIED TYPE: ICD-10-CM

## 2024-01-23 DIAGNOSIS — R06.09 DYSPNEA ON EXERTION: Primary | ICD-10-CM

## 2024-01-23 PROCEDURE — G8428 CUR MEDS NOT DOCUMENT: HCPCS | Performed by: INTERNAL MEDICINE

## 2024-01-23 PROCEDURE — 99214 OFFICE O/P EST MOD 30 MIN: CPT | Performed by: INTERNAL MEDICINE

## 2024-01-23 PROCEDURE — G8417 CALC BMI ABV UP PARAM F/U: HCPCS | Performed by: INTERNAL MEDICINE

## 2024-01-23 PROCEDURE — 4004F PT TOBACCO SCREEN RCVD TLK: CPT | Performed by: INTERNAL MEDICINE

## 2024-01-23 PROCEDURE — G8484 FLU IMMUNIZE NO ADMIN: HCPCS | Performed by: INTERNAL MEDICINE

## 2024-01-23 PROCEDURE — 3017F COLORECTAL CA SCREEN DOC REV: CPT | Performed by: INTERNAL MEDICINE

## 2024-01-23 RX ORDER — ALBUTEROL SULFATE 90 UG/1
2 AEROSOL, METERED RESPIRATORY (INHALATION) 4 TIMES DAILY PRN
Qty: 54 G | Refills: 1 | Status: SHIPPED | OUTPATIENT
Start: 2024-01-23

## 2024-01-23 RX ORDER — ALBUTEROL SULFATE 90 UG/1
2 AEROSOL, METERED RESPIRATORY (INHALATION) 4 TIMES DAILY PRN
Qty: 54 G | Refills: 1 | Status: SHIPPED | OUTPATIENT
Start: 2024-01-23 | End: 2024-01-23

## 2024-01-23 ASSESSMENT — PATIENT HEALTH QUESTIONNAIRE - PHQ9
SUM OF ALL RESPONSES TO PHQ9 QUESTIONS 1 & 2: 0
DEPRESSION UNABLE TO ASSESS: FUNCTIONAL CAPACITY MOTIVATION LIMITS ACCURACY
1. LITTLE INTEREST OR PLEASURE IN DOING THINGS: 0
SUM OF ALL RESPONSES TO PHQ QUESTIONS 1-9: 0
2. FEELING DOWN, DEPRESSED OR HOPELESS: 0
SUM OF ALL RESPONSES TO PHQ QUESTIONS 1-9: 0

## 2024-01-23 ASSESSMENT — ENCOUNTER SYMPTOMS: SHORTNESS OF BREATH: 1

## 2024-01-23 NOTE — PATIENT INSTRUCTIONS
Mediterranean diet may also include red wine with your meal--1 glass each day for women and up to 2 glasses a day for men.  Tips for eating at home  Use herbs, spices, garlic, lemon zest, and citrus juice instead of salt to add flavor to foods.  Add avocado slices to your sandwich instead of benoit.  Have fish for lunch or dinner instead of red meat. Brush the fish with olive oil, and broil or grill it.  Sprinkle your salad with seeds or nuts instead of cheese.  Cook with olive or canola oil instead of butter or oils that are high in saturated fat.  Switch from 2% milk or whole milk to 1% or fat-free milk.  Dip raw vegetables in a vinaigrette dressing or hummus instead of dips made from mayonnaise or sour cream.  Have a piece of fruit for dessert instead of a piece of cake. Try baked apples, or have some dried fruit.  Tips for eating out  Try broiled, grilled, baked, or poached fish instead of having it fried or breaded.  Ask your  to have your meals prepared with olive oil instead of butter.  Order dishes made with marinara sauce or sauces made from olive oil. Avoid sauces made from cream or mayonnaise.  Choose whole-grain breads, whole wheat pasta and pizza crust, brown rice, beans, and lentils.  Cut back on butter or margarine on bread. Instead, you can dip your bread in a small amount of olive oil.  Ask for a side salad or grilled vegetables instead of french fries or chips.  Where can you learn more?  Go to https://www.[employer].net/patientEd and enter O407 to learn more about \"Learning About the Mediterranean Diet.\"  Current as of: May 9, 2022               Content Version: 13.5  © 3537-9149 [employer].   Care instructions adapted under license by "[employer]. If you have questions about a medical condition or this instruction, always ask your healthcare professional. [employer] disclaims any warranty or liability for your use of this information.

## 2024-01-23 NOTE — PROGRESS NOTES
Have you had Fatigue?  Yes if so how long daily how bad moderate    2.   Have you had have you had Chest Pain? Yes if so how long 15 minutes how frequent 1 week .      relieved by rest or NTG? No     3.   Have you had Dyspnea (SOB) ? Yes if so how long everyday can you walk 2 blocks or a flight of stairs without SOB No    4.   Have you had Orthopnea? No     5.   Have you had PND? No         6.   Have you had leg swelling? No     7.    Have you had any weight gain? Yes     8. Have you had any palpitations? Yes if so how long 5 minutes how bad moderate     9. Have you had any syncope? Yes if so how long 1 how frequent 1 time in 2 weeks how     bad moderate    10. Do you have any wounds on legs?No

## 2024-01-24 LAB
D DIMER PPP FEU-MCNC: 0.3 MG/L FEU (ref 0–0.49)
NT-PROBNP SERPL-MCNC: <36 PG/ML (ref 0–249)

## 2024-01-26 ENCOUNTER — HOSPITAL ENCOUNTER (OUTPATIENT)
Facility: HOSPITAL | Age: 54
End: 2024-01-26
Payer: MEDICARE

## 2024-01-26 DIAGNOSIS — R06.09 DYSPNEA ON EXERTION: ICD-10-CM

## 2024-01-26 PROCEDURE — 71046 X-RAY EXAM CHEST 2 VIEWS: CPT

## 2024-02-15 ENCOUNTER — APPOINTMENT (OUTPATIENT)
Facility: HOSPITAL | Age: 54
End: 2024-02-15
Payer: MEDICARE

## 2024-02-15 ENCOUNTER — HOSPITAL ENCOUNTER (EMERGENCY)
Facility: HOSPITAL | Age: 54
Discharge: HOME | End: 2024-02-15
Payer: MEDICARE

## 2024-02-15 VITALS
DIASTOLIC BLOOD PRESSURE: 61 MMHG | SYSTOLIC BLOOD PRESSURE: 105 MMHG | OXYGEN SATURATION: 100 % | HEIGHT: 61 IN | BODY MASS INDEX: 30.78 KG/M2 | WEIGHT: 163 LBS | RESPIRATION RATE: 20 BRPM | HEART RATE: 70 BPM | TEMPERATURE: 98.1 F

## 2024-02-15 DIAGNOSIS — R42 LIGHTHEADEDNESS: ICD-10-CM

## 2024-02-15 DIAGNOSIS — R51.9 NONINTRACTABLE EPISODIC HEADACHE, UNSPECIFIED HEADACHE TYPE: Primary | ICD-10-CM

## 2024-02-15 LAB
ANION GAP SERPL CALC-SCNC: 1 MMOL/L (ref 3–18)
APPEARANCE UR: CLEAR
BASOPHILS # BLD: 0 K/UL (ref 0–0.1)
BASOPHILS NFR BLD: 1 % (ref 0–2)
BILIRUB UR QL: NEGATIVE
BUN SERPL-MCNC: 12 MG/DL (ref 7–18)
BUN/CREAT SERPL: 15 (ref 12–20)
CALCIUM SERPL-MCNC: 9.7 MG/DL (ref 8.5–10.1)
CHLORIDE SERPL-SCNC: 109 MMOL/L (ref 100–111)
CO2 SERPL-SCNC: 29 MMOL/L (ref 21–32)
COLOR UR: YELLOW
CREAT SERPL-MCNC: 0.81 MG/DL (ref 0.6–1.3)
DIFFERENTIAL METHOD BLD: ABNORMAL
EOSINOPHIL # BLD: 0.1 K/UL (ref 0–0.4)
EOSINOPHIL NFR BLD: 1 % (ref 0–5)
EPITH CASTS URNS QL MICRO: NORMAL /LPF (ref 0–5)
ERYTHROCYTE [DISTWIDTH] IN BLOOD BY AUTOMATED COUNT: 15.9 % (ref 11.6–14.5)
GLUCOSE BLD STRIP.AUTO-MCNC: 141 MG/DL (ref 70–110)
GLUCOSE SERPL-MCNC: 147 MG/DL (ref 74–99)
GLUCOSE UR STRIP.AUTO-MCNC: NEGATIVE MG/DL
HCT VFR BLD AUTO: 39.9 % (ref 35–45)
HGB BLD-MCNC: 12.6 G/DL (ref 12–16)
HGB UR QL STRIP: NEGATIVE
IMM GRANULOCYTES # BLD AUTO: 0 K/UL (ref 0–0.04)
IMM GRANULOCYTES NFR BLD AUTO: 0 % (ref 0–0.5)
KETONES UR QL STRIP.AUTO: NEGATIVE MG/DL
LEUKOCYTE ESTERASE UR QL STRIP.AUTO: ABNORMAL
LYMPHOCYTES # BLD: 2.1 K/UL (ref 0.9–3.6)
LYMPHOCYTES NFR BLD: 36 % (ref 21–52)
MAGNESIUM SERPL-MCNC: 2.1 MG/DL (ref 1.6–2.6)
MCH RBC QN AUTO: 25.7 PG (ref 24–34)
MCHC RBC AUTO-ENTMCNC: 31.6 G/DL (ref 31–37)
MCV RBC AUTO: 81.3 FL (ref 78–100)
MONOCYTES # BLD: 0.2 K/UL (ref 0.05–1.2)
MONOCYTES NFR BLD: 3 % (ref 3–10)
NEUTS SEG # BLD: 3.4 K/UL (ref 1.8–8)
NEUTS SEG NFR BLD: 59 % (ref 40–73)
NITRITE UR QL STRIP.AUTO: NEGATIVE
NRBC # BLD: 0 K/UL (ref 0–0.01)
NRBC BLD-RTO: 0 PER 100 WBC
PH UR STRIP: 5.5 (ref 5–8)
PLATELET # BLD AUTO: 280 K/UL (ref 135–420)
PMV BLD AUTO: 10.8 FL (ref 9.2–11.8)
POTASSIUM SERPL-SCNC: 3.7 MMOL/L (ref 3.5–5.5)
PROT UR STRIP-MCNC: NEGATIVE MG/DL
RBC # BLD AUTO: 4.91 M/UL (ref 4.2–5.3)
RBC #/AREA URNS HPF: NORMAL /HPF (ref 0–5)
SODIUM SERPL-SCNC: 139 MMOL/L (ref 136–145)
SP GR UR REFRACTOMETRY: 1.03 (ref 1–1.03)
TROPONIN I SERPL HS-MCNC: 4 NG/L (ref 0–54)
UROBILINOGEN UR QL STRIP.AUTO: 1 EU/DL (ref 0.2–1)
WBC # BLD AUTO: 5.7 K/UL (ref 4.6–13.2)
WBC URNS QL MICRO: NORMAL /HPF (ref 0–4)

## 2024-02-15 PROCEDURE — 83735 ASSAY OF MAGNESIUM: CPT

## 2024-02-15 PROCEDURE — 82962 GLUCOSE BLOOD TEST: CPT

## 2024-02-15 PROCEDURE — 2580000003 HC RX 258: Performed by: PHYSICIAN ASSISTANT

## 2024-02-15 PROCEDURE — 81001 URINALYSIS AUTO W/SCOPE: CPT

## 2024-02-15 PROCEDURE — 85025 COMPLETE CBC W/AUTO DIFF WBC: CPT

## 2024-02-15 PROCEDURE — 99284 EMERGENCY DEPT VISIT MOD MDM: CPT

## 2024-02-15 PROCEDURE — 80048 BASIC METABOLIC PNL TOTAL CA: CPT

## 2024-02-15 PROCEDURE — 93005 ELECTROCARDIOGRAM TRACING: CPT | Performed by: STUDENT IN AN ORGANIZED HEALTH CARE EDUCATION/TRAINING PROGRAM

## 2024-02-15 PROCEDURE — 70450 CT HEAD/BRAIN W/O DYE: CPT

## 2024-02-15 PROCEDURE — 84484 ASSAY OF TROPONIN QUANT: CPT

## 2024-02-15 PROCEDURE — 96375 TX/PRO/DX INJ NEW DRUG ADDON: CPT

## 2024-02-15 PROCEDURE — 6360000002 HC RX W HCPCS: Performed by: PHYSICIAN ASSISTANT

## 2024-02-15 PROCEDURE — 96374 THER/PROPH/DIAG INJ IV PUSH: CPT

## 2024-02-15 RX ORDER — DIPHENHYDRAMINE HYDROCHLORIDE 50 MG/ML
12.5 INJECTION INTRAMUSCULAR; INTRAVENOUS
Status: COMPLETED | OUTPATIENT
Start: 2024-02-15 | End: 2024-02-15

## 2024-02-15 RX ORDER — NAPROXEN 500 MG/1
500 TABLET ORAL 2 TIMES DAILY
Qty: 14 TABLET | Refills: 0 | Status: SHIPPED | OUTPATIENT
Start: 2024-02-15 | End: 2024-02-22

## 2024-02-15 RX ORDER — 0.9 % SODIUM CHLORIDE 0.9 %
1000 INTRAVENOUS SOLUTION INTRAVENOUS ONCE
Status: COMPLETED | OUTPATIENT
Start: 2024-02-15 | End: 2024-02-15

## 2024-02-15 RX ORDER — ACETAMINOPHEN 500 MG
1000 TABLET ORAL EVERY 6 HOURS PRN
Qty: 56 TABLET | Refills: 0 | Status: SHIPPED | OUTPATIENT
Start: 2024-02-15 | End: 2024-02-22

## 2024-02-15 RX ORDER — METOCLOPRAMIDE HYDROCHLORIDE 5 MG/ML
5 INJECTION INTRAMUSCULAR; INTRAVENOUS
Status: COMPLETED | OUTPATIENT
Start: 2024-02-15 | End: 2024-02-15

## 2024-02-15 RX ADMIN — DIPHENHYDRAMINE HYDROCHLORIDE 12.5 MG: 50 INJECTION INTRAMUSCULAR; INTRAVENOUS at 16:08

## 2024-02-15 RX ADMIN — SODIUM CHLORIDE 1000 ML: 9 INJECTION, SOLUTION INTRAVENOUS at 16:06

## 2024-02-15 RX ADMIN — METOCLOPRAMIDE 5 MG: 5 INJECTION, SOLUTION INTRAMUSCULAR; INTRAVENOUS at 16:08

## 2024-02-15 ASSESSMENT — LIFESTYLE VARIABLES
HOW OFTEN DO YOU HAVE A DRINK CONTAINING ALCOHOL: NEVER
HOW MANY STANDARD DRINKS CONTAINING ALCOHOL DO YOU HAVE ON A TYPICAL DAY: PATIENT DOES NOT DRINK

## 2024-02-15 ASSESSMENT — PAIN SCALES - GENERAL: PAINLEVEL_OUTOF10: 6

## 2024-02-15 ASSESSMENT — PAIN DESCRIPTION - ORIENTATION: ORIENTATION: POSTERIOR

## 2024-02-15 ASSESSMENT — PAIN DESCRIPTION - LOCATION: LOCATION: HEAD

## 2024-02-15 ASSESSMENT — PAIN - FUNCTIONAL ASSESSMENT: PAIN_FUNCTIONAL_ASSESSMENT: 0-10

## 2024-02-15 NOTE — ED TRIAGE NOTES
Patient reports having head pressure that started on Sunday at 1800 (around dinner time. Patient reports that she has had pressure in the back of her head ever since.

## 2024-02-15 NOTE — ED NOTES
Assuming care of patient. Introduced myself to the patient. Patient resting in stretcher. Updated on plan of care.   Vital signs updated.

## 2024-02-15 NOTE — DISCHARGE INSTRUCTIONS
Take medication as prescribed. Follow-up with your primary care physician within 2 days for reassessment. Bring the results from this visit with you for their review. Return to the ED immediately for any new, worsening, or persistent symptoms, including fever, dizziness, or any other medical concerns.

## 2024-02-15 NOTE — ED PROVIDER NOTES
EMERGENCY DEPARTMENT HISTORY AND PHYSICAL EXAM    5:45 PM      Date: 2/15/2024  Patient Name: Diana Holguin    History of Presenting Illness     Chief Complaint   Patient presents with    Headache    Dizziness         History Provided By: Patient, family member     Additional History (Context): Diana Holguin is a 53 y.o. female with   Past Medical History:   Diagnosis Date    Anxiety     Bipolar 1 disorder (HCC)     Cigarette smoker     Dyspnea on exertion 08/2011    Negative cardiac and pulmonary workup    Heart murmur     Heart murmur     History of gestational diabetes     Musculoskeletal disorder     DDD    Neurological disorder     migraines    Ovarian cyst     PTSD (post-traumatic stress disorder)     Migraines who presents with complaint of intermittent posterior headache and lightheadedness x 5 days.  Patient notes she has tried over-the-counter medication for symptoms without relief.  Patient denies head injury or trauma, changes in vision, slurred speech, facial droop, extremity weakness, numbness or tingling, nausea or vomiting.  Denies WHOL.     PCP: Todd Lind MD    No current facility-administered medications for this encounter.     Current Outpatient Medications   Medication Sig Dispense Refill    acetaminophen (TYLENOL) 500 MG tablet Take 2 tablets by mouth every 6 hours as needed for Pain 56 tablet 0    naproxen (NAPROSYN) 500 MG tablet Take 1 tablet by mouth 2 times daily for 7 days 14 tablet 0    albuterol sulfate HFA (VENTOLIN HFA) 108 (90 Base) MCG/ACT inhaler Inhale 2 puffs into the lungs 4 times daily as needed for Wheezing 54 g 1    triamcinolone (KENALOG) 0.1 % cream       gabapentin (NEURONTIN) 300 MG capsule Take 2 capsules by mouth 3 times daily for 90 days. Intended supply: 30 days Max Daily Amount: 1,800 mg 180 capsule 2    gabapentin (NEURONTIN) 300 MG capsule Take 1 capsule by mouth 3 times daily for 30 days. Intended supply: 30 days Max Daily Amount: 900 mg 90

## 2024-02-16 LAB
EKG ATRIAL RATE: 80 BPM
EKG DIAGNOSIS: NORMAL
EKG P AXIS: 48 DEGREES
EKG P-R INTERVAL: 152 MS
EKG Q-T INTERVAL: 369 MS
EKG QRS DURATION: 88 MS
EKG QTC CALCULATION (BAZETT): 426 MS
EKG R AXIS: -7 DEGREES
EKG T AXIS: 47 DEGREES
EKG VENTRICULAR RATE: 80 BPM

## 2024-02-16 PROCEDURE — 93010 ELECTROCARDIOGRAM REPORT: CPT | Performed by: INTERNAL MEDICINE

## 2024-02-24 ENCOUNTER — HOSPITAL ENCOUNTER (EMERGENCY)
Facility: HOSPITAL | Age: 54
Discharge: HOME | End: 2024-02-24
Attending: EMERGENCY MEDICINE
Payer: MEDICARE

## 2024-02-24 ENCOUNTER — APPOINTMENT (OUTPATIENT)
Facility: HOSPITAL | Age: 54
End: 2024-02-24
Payer: MEDICARE

## 2024-02-24 VITALS
OXYGEN SATURATION: 100 % | HEIGHT: 64 IN | RESPIRATION RATE: 22 BRPM | SYSTOLIC BLOOD PRESSURE: 115 MMHG | DIASTOLIC BLOOD PRESSURE: 67 MMHG | HEART RATE: 89 BPM | BODY MASS INDEX: 27.31 KG/M2 | TEMPERATURE: 97.8 F | WEIGHT: 160 LBS

## 2024-02-24 DIAGNOSIS — R07.89 CHEST WALL PAIN: Primary | ICD-10-CM

## 2024-02-24 LAB
ALBUMIN SERPL-MCNC: 3.6 G/DL (ref 3.4–5)
ALBUMIN/GLOB SERPL: 0.9 (ref 0.8–1.7)
ALP SERPL-CCNC: 118 U/L (ref 45–117)
ALT SERPL-CCNC: 24 U/L (ref 13–56)
ANION GAP SERPL CALC-SCNC: 5 MMOL/L (ref 3–18)
AST SERPL-CCNC: 18 U/L (ref 10–38)
BASOPHILS # BLD: 0 K/UL (ref 0–0.1)
BASOPHILS NFR BLD: 1 % (ref 0–2)
BILIRUB SERPL-MCNC: 0.4 MG/DL (ref 0.2–1)
BUN SERPL-MCNC: 14 MG/DL (ref 7–18)
BUN/CREAT SERPL: 17 (ref 12–20)
CALCIUM SERPL-MCNC: 9.5 MG/DL (ref 8.5–10.1)
CHLORIDE SERPL-SCNC: 108 MMOL/L (ref 100–111)
CO2 SERPL-SCNC: 26 MMOL/L (ref 21–32)
CREAT SERPL-MCNC: 0.82 MG/DL (ref 0.6–1.3)
DIFFERENTIAL METHOD BLD: ABNORMAL
EOSINOPHIL # BLD: 0 K/UL (ref 0–0.4)
EOSINOPHIL NFR BLD: 0 % (ref 0–5)
ERYTHROCYTE [DISTWIDTH] IN BLOOD BY AUTOMATED COUNT: 15.9 % (ref 11.6–14.5)
GLOBULIN SER CALC-MCNC: 4.1 G/DL (ref 2–4)
GLUCOSE SERPL-MCNC: 110 MG/DL (ref 74–99)
HCT VFR BLD AUTO: 35 % (ref 35–45)
HGB BLD-MCNC: 11.3 G/DL (ref 12–16)
IMM GRANULOCYTES # BLD AUTO: 0 K/UL (ref 0–0.04)
IMM GRANULOCYTES NFR BLD AUTO: 0 % (ref 0–0.5)
LYMPHOCYTES # BLD: 1.6 K/UL (ref 0.9–3.6)
LYMPHOCYTES NFR BLD: 29 % (ref 21–52)
MCH RBC QN AUTO: 25.9 PG (ref 24–34)
MCHC RBC AUTO-ENTMCNC: 32.3 G/DL (ref 31–37)
MCV RBC AUTO: 80.1 FL (ref 78–100)
MONOCYTES # BLD: 0.4 K/UL (ref 0.05–1.2)
MONOCYTES NFR BLD: 8 % (ref 3–10)
NEUTS SEG # BLD: 3.5 K/UL (ref 1.8–8)
NEUTS SEG NFR BLD: 62 % (ref 40–73)
NRBC # BLD: 0 K/UL (ref 0–0.01)
NRBC BLD-RTO: 0 PER 100 WBC
PLATELET # BLD AUTO: 252 K/UL (ref 135–420)
PMV BLD AUTO: 10.4 FL (ref 9.2–11.8)
POTASSIUM SERPL-SCNC: 3.9 MMOL/L (ref 3.5–5.5)
PROT SERPL-MCNC: 7.7 G/DL (ref 6.4–8.2)
RBC # BLD AUTO: 4.37 M/UL (ref 4.2–5.3)
SODIUM SERPL-SCNC: 139 MMOL/L (ref 136–145)
TROPONIN I SERPL HS-MCNC: 4 NG/L (ref 0–54)
TROPONIN I SERPL HS-MCNC: 5 NG/L (ref 0–54)
WBC # BLD AUTO: 5.6 K/UL (ref 4.6–13.2)

## 2024-02-24 PROCEDURE — 6360000002 HC RX W HCPCS: Performed by: EMERGENCY MEDICINE

## 2024-02-24 PROCEDURE — 84484 ASSAY OF TROPONIN QUANT: CPT

## 2024-02-24 PROCEDURE — 99285 EMERGENCY DEPT VISIT HI MDM: CPT

## 2024-02-24 PROCEDURE — 80053 COMPREHEN METABOLIC PANEL: CPT

## 2024-02-24 PROCEDURE — 85025 COMPLETE CBC W/AUTO DIFF WBC: CPT

## 2024-02-24 PROCEDURE — 93005 ELECTROCARDIOGRAM TRACING: CPT | Performed by: EMERGENCY MEDICINE

## 2024-02-24 PROCEDURE — 96374 THER/PROPH/DIAG INJ IV PUSH: CPT

## 2024-02-24 PROCEDURE — 71045 X-RAY EXAM CHEST 1 VIEW: CPT

## 2024-02-24 RX ORDER — IBUPROFEN 600 MG/1
600 TABLET ORAL 3 TIMES DAILY PRN
Qty: 30 TABLET | Refills: 0 | Status: SHIPPED | OUTPATIENT
Start: 2024-02-24

## 2024-02-24 RX ORDER — KETOROLAC TROMETHAMINE 15 MG/ML
15 INJECTION, SOLUTION INTRAMUSCULAR; INTRAVENOUS
Status: COMPLETED | OUTPATIENT
Start: 2024-02-24 | End: 2024-02-24

## 2024-02-24 RX ADMIN — KETOROLAC TROMETHAMINE 15 MG: 15 INJECTION, SOLUTION INTRAMUSCULAR; INTRAVENOUS at 21:28

## 2024-02-24 ASSESSMENT — ENCOUNTER SYMPTOMS
RESPIRATORY NEGATIVE: 1
ABDOMINAL PAIN: 0
NAUSEA: 0
BACK PAIN: 1
VOMITING: 0

## 2024-02-24 ASSESSMENT — PAIN SCALES - GENERAL
PAINLEVEL_OUTOF10: 9
PAINLEVEL_OUTOF10: 9

## 2024-02-24 ASSESSMENT — PAIN - FUNCTIONAL ASSESSMENT: PAIN_FUNCTIONAL_ASSESSMENT: 0-10

## 2024-02-24 ASSESSMENT — PAIN DESCRIPTION - ORIENTATION: ORIENTATION: MID

## 2024-02-24 ASSESSMENT — PAIN DESCRIPTION - DESCRIPTORS: DESCRIPTORS: ACHING;PRESSURE;SHARP

## 2024-02-24 ASSESSMENT — PAIN DESCRIPTION - LOCATION: LOCATION: CHEST;BACK

## 2024-02-25 LAB
EKG ATRIAL RATE: 75 BPM
EKG DIAGNOSIS: NORMAL
EKG P AXIS: 19 DEGREES
EKG P-R INTERVAL: 132 MS
EKG Q-T INTERVAL: 372 MS
EKG QRS DURATION: 82 MS
EKG QTC CALCULATION (BAZETT): 415 MS
EKG R AXIS: -9 DEGREES
EKG T AXIS: 35 DEGREES
EKG VENTRICULAR RATE: 75 BPM

## 2024-02-25 PROCEDURE — 93010 ELECTROCARDIOGRAM REPORT: CPT | Performed by: INTERNAL MEDICINE

## 2024-02-25 NOTE — ED TRIAGE NOTES
Pt c/o of upper back pain and chest pain for 2 days. Pt reports being see here recently and not better.

## 2024-02-25 NOTE — ED PROVIDER NOTES
(from the past 24 hour(s))   EKG 12 Lead    Collection Time: 02/24/24  8:04 PM   Result Value Ref Range    Ventricular Rate 75 BPM    Atrial Rate 75 BPM    P-R Interval 132 ms    QRS Duration 82 ms    Q-T Interval 372 ms    QTc Calculation (Bazett) 415 ms    P Axis 19 degrees    R Axis -9 degrees    T Axis 35 degrees    Diagnosis       Normal sinus rhythm  Normal ECG  When compared with ECG of 15-FEB-2024 14:14,  PREVIOUS ECG IS PRESENT     Troponin    Collection Time: 02/24/24  8:10 PM   Result Value Ref Range    Troponin, High Sensitivity 5 0 - 54 ng/L   CBC with Auto Differential    Collection Time: 02/24/24  8:10 PM   Result Value Ref Range    WBC 5.6 4.6 - 13.2 K/uL    RBC 4.37 4.20 - 5.30 M/uL    Hemoglobin 11.3 (L) 12.0 - 16.0 g/dL    Hematocrit 35.0 35.0 - 45.0 %    MCV 80.1 78.0 - 100.0 FL    MCH 25.9 24.0 - 34.0 PG    MCHC 32.3 31.0 - 37.0 g/dL    RDW 15.9 (H) 11.6 - 14.5 %    Platelets 252 135 - 420 K/uL    MPV 10.4 9.2 - 11.8 FL    Nucleated RBCs 0.0 0  WBC    nRBC 0.00 0.00 - 0.01 K/uL    Neutrophils % 62 40 - 73 %    Lymphocytes % 29 21 - 52 %    Monocytes % 8 3 - 10 %    Eosinophils % 0 0 - 5 %    Basophils % 1 0 - 2 %    Immature Granulocytes 0 0.0 - 0.5 %    Neutrophils Absolute 3.5 1.8 - 8.0 K/UL    Lymphocytes Absolute 1.6 0.9 - 3.6 K/UL    Monocytes Absolute 0.4 0.05 - 1.2 K/UL    Eosinophils Absolute 0.0 0.0 - 0.4 K/UL    Basophils Absolute 0.0 0.0 - 0.1 K/UL    Absolute Immature Granulocyte 0.0 0.00 - 0.04 K/UL    Differential Type AUTOMATED     Comprehensive Metabolic Panel    Collection Time: 02/24/24  8:10 PM   Result Value Ref Range    Sodium 139 136 - 145 mmol/L    Potassium 3.9 3.5 - 5.5 mmol/L    Chloride 108 100 - 111 mmol/L    CO2 26 21 - 32 mmol/L    Anion Gap 5 3.0 - 18 mmol/L    Glucose 110 (H) 74 - 99 mg/dL    BUN 14 7.0 - 18 MG/DL    Creatinine 0.82 0.6 - 1.3 MG/DL    Bun/Cre Ratio 17 12 - 20      Est, Glom Filt Rate >60 >60 ml/min/1.73m2    Calcium 9.5 8.5 - 10.1 MG/DL

## 2024-02-25 NOTE — ED NOTES
Aox4 with a cc of back pain and chest pain that started 2 days prior. Took tylenol and naproxen as ordered from last visit.

## 2024-03-28 NOTE — PROGRESS NOTES
Patient: Diana Holguin                MRN: [id number]       SSN: [id number]  YOB: 1970        AGE: 54 y.o.        SEX: female      PCP: Todd Lind MD  24    Chief Complaint   Patient presents with    Knee Pain     Bilateral knee pain      HISTORY:  Diana Holguin is a 54 y.o. female who is seen for increased bilateral knee pain.  She successfully completed a bilateral Gelsyn-3 series on 10/16/23 but her pains have returned. Her pain has increased within the last month. She denies injury and believes her pain is due to wear and tear.  She feels pain with standing, walking and stair climbing.  She experiences startup pain after sitting.    She was previously seen by Dr. Turcios and Dr. Davis for chronic back and neck pain. She states that she has 5 bulging lumbar discs.      Occupation, etc: Ms. Holguin receives Social Security Disability benefits for fibromyalgia, PTSD and bipolar disorder. 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 She lives with her adult daughter in [place]. She also has adult two sons and three grandchildren. She raised her three children as a single mother.  Wt Readings from Last 3 Encounters:   24 73.6 kg (162 lb 3.2 oz)   24 72.6 kg (160 lb)   02/15/24 73.9 kg (163 lb)      Body mass index is 27.84 kg/m².    Patient Active Problem List   Diagnosis    Murmur    Backache    SOB (shortness of breath)       Social History     Tobacco Use    Smoking status: Former     Current packs/day: 0.00     Types: Cigarettes     Quit date: 2013     Years since quittin.1    Smokeless tobacco: Current    Tobacco comments:     Quit smoking: vaping   Substance Use Topics    Alcohol use: No    Drug use: No        No Known Allergies     Current Outpatient Medications   Medication Sig    ibuprofen (ADVIL;MOTRIN) 600 MG tablet Take 1 tablet by mouth 3 times daily as needed for Pain

## 2024-04-03 ENCOUNTER — OFFICE VISIT (OUTPATIENT)
Age: 54
End: 2024-04-03
Payer: MEDICARE

## 2024-04-03 VITALS — TEMPERATURE: 97.7 F | HEIGHT: 64 IN | BODY MASS INDEX: 27.69 KG/M2 | WEIGHT: 162.2 LBS

## 2024-04-03 DIAGNOSIS — M25.562 CHRONIC PAIN OF LEFT KNEE: ICD-10-CM

## 2024-04-03 DIAGNOSIS — M17.12 UNILATERAL PRIMARY OSTEOARTHRITIS, LEFT KNEE: Primary | ICD-10-CM

## 2024-04-03 DIAGNOSIS — G89.29 CHRONIC PAIN OF LEFT KNEE: ICD-10-CM

## 2024-04-03 DIAGNOSIS — G89.29 CHRONIC PAIN OF RIGHT KNEE: ICD-10-CM

## 2024-04-03 DIAGNOSIS — M17.11 UNILATERAL PRIMARY OSTEOARTHRITIS, RIGHT KNEE: ICD-10-CM

## 2024-04-03 DIAGNOSIS — M25.561 CHRONIC PAIN OF RIGHT KNEE: ICD-10-CM

## 2024-04-03 PROCEDURE — 99213 OFFICE O/P EST LOW 20 MIN: CPT | Performed by: SPECIALIST

## 2024-04-03 PROCEDURE — 3017F COLORECTAL CA SCREEN DOC REV: CPT | Performed by: SPECIALIST

## 2024-04-03 PROCEDURE — G8427 DOCREV CUR MEDS BY ELIG CLIN: HCPCS | Performed by: SPECIALIST

## 2024-04-03 PROCEDURE — G8417 CALC BMI ABV UP PARAM F/U: HCPCS | Performed by: SPECIALIST

## 2024-04-03 PROCEDURE — 20610 DRAIN/INJ JOINT/BURSA W/O US: CPT | Performed by: SPECIALIST

## 2024-04-03 PROCEDURE — 4004F PT TOBACCO SCREEN RCVD TLK: CPT | Performed by: SPECIALIST

## 2024-04-03 RX ORDER — BUPIVACAINE HYDROCHLORIDE 5 MG/ML
4 INJECTION, SOLUTION PERINEURAL ONCE
Status: COMPLETED | OUTPATIENT
Start: 2024-04-03 | End: 2024-04-03

## 2024-04-03 RX ORDER — BETAMETHASONE SODIUM PHOSPHATE AND BETAMETHASONE ACETATE 3; 3 MG/ML; MG/ML
3 INJECTION, SUSPENSION INTRA-ARTICULAR; INTRALESIONAL; INTRAMUSCULAR; SOFT TISSUE ONCE
Status: COMPLETED | OUTPATIENT
Start: 2024-04-03 | End: 2024-04-03

## 2024-04-03 RX ADMIN — BETAMETHASONE SODIUM PHOSPHATE AND BETAMETHASONE ACETATE 3 MG: 3; 3 INJECTION, SUSPENSION INTRA-ARTICULAR; INTRALESIONAL; INTRAMUSCULAR; SOFT TISSUE at 10:44

## 2024-04-03 RX ADMIN — BUPIVACAINE HYDROCHLORIDE 20 MG: 5 INJECTION, SOLUTION PERINEURAL at 10:45

## 2024-04-03 RX ADMIN — BUPIVACAINE HYDROCHLORIDE 20 MG: 5 INJECTION, SOLUTION PERINEURAL at 10:44

## 2024-04-03 RX ADMIN — BETAMETHASONE SODIUM PHOSPHATE AND BETAMETHASONE ACETATE 3 MG: 3; 3 INJECTION, SUSPENSION INTRA-ARTICULAR; INTRALESIONAL; INTRAMUSCULAR; SOFT TISSUE at 10:43

## 2024-04-29 ENCOUNTER — TELEPHONE (OUTPATIENT)
Age: 54
End: 2024-04-29

## 2024-04-29 DIAGNOSIS — G89.29 CHRONIC PAIN OF LEFT KNEE: ICD-10-CM

## 2024-04-29 DIAGNOSIS — M17.12 UNILATERAL PRIMARY OSTEOARTHRITIS, LEFT KNEE: Primary | ICD-10-CM

## 2024-04-29 DIAGNOSIS — M25.561 CHRONIC PAIN OF RIGHT KNEE: ICD-10-CM

## 2024-04-29 DIAGNOSIS — M25.562 CHRONIC PAIN OF LEFT KNEE: ICD-10-CM

## 2024-04-29 DIAGNOSIS — M17.11 UNILATERAL PRIMARY OSTEOARTHRITIS, RIGHT KNEE: ICD-10-CM

## 2024-04-29 DIAGNOSIS — G89.29 CHRONIC PAIN OF RIGHT KNEE: ICD-10-CM

## 2024-04-29 NOTE — TELEPHONE ENCOUNTER
Patient called and states that she would like to move forward with getting the gel injections done as discussed at her previous appt and would like for an order to be placed.        Please call and advise patient at   [phone]

## 2024-04-29 NOTE — TELEPHONE ENCOUNTER
Ok per Dr Georges--visco injections bilateral knees--order placed--left message for pt--she will be contacted once approved

## 2024-05-06 ENCOUNTER — OFFICE VISIT (OUTPATIENT)
Age: 54
End: 2024-05-06
Payer: MEDICARE

## 2024-05-06 VITALS
HEART RATE: 68 BPM | WEIGHT: 162 LBS | BODY MASS INDEX: 27.66 KG/M2 | SYSTOLIC BLOOD PRESSURE: 105 MMHG | HEIGHT: 64 IN | DIASTOLIC BLOOD PRESSURE: 69 MMHG

## 2024-05-06 DIAGNOSIS — R06.09 DYSPNEA ON EXERTION: ICD-10-CM

## 2024-05-06 DIAGNOSIS — Z72.0 TOBACCO ABUSE: ICD-10-CM

## 2024-05-06 DIAGNOSIS — R07.9 CHEST PAIN, UNSPECIFIED TYPE: Primary | ICD-10-CM

## 2024-05-06 DIAGNOSIS — R00.2 PALPITATIONS: ICD-10-CM

## 2024-05-06 PROCEDURE — 99214 OFFICE O/P EST MOD 30 MIN: CPT | Performed by: INTERNAL MEDICINE

## 2024-05-06 PROCEDURE — G8428 CUR MEDS NOT DOCUMENT: HCPCS | Performed by: INTERNAL MEDICINE

## 2024-05-06 PROCEDURE — G8417 CALC BMI ABV UP PARAM F/U: HCPCS | Performed by: INTERNAL MEDICINE

## 2024-05-06 PROCEDURE — 4004F PT TOBACCO SCREEN RCVD TLK: CPT | Performed by: INTERNAL MEDICINE

## 2024-05-06 PROCEDURE — 3017F COLORECTAL CA SCREEN DOC REV: CPT | Performed by: INTERNAL MEDICINE

## 2024-05-06 RX ORDER — NICOTINE 21 MG/24HR
1 PATCH, TRANSDERMAL 24 HOURS TRANSDERMAL DAILY
Qty: 42 PATCH | Refills: 0 | Status: SHIPPED | OUTPATIENT
Start: 2024-05-06 | End: 2024-06-17

## 2024-05-06 RX ORDER — ALBUTEROL SULFATE 90 UG/1
2 AEROSOL, METERED RESPIRATORY (INHALATION) 4 TIMES DAILY PRN
Qty: 54 G | Refills: 1 | Status: SHIPPED | OUTPATIENT
Start: 2024-05-06 | End: 2024-05-06

## 2024-05-06 RX ORDER — ALBUTEROL SULFATE 90 UG/1
2 AEROSOL, METERED RESPIRATORY (INHALATION) 4 TIMES DAILY PRN
Qty: 54 G | Refills: 5 | Status: SHIPPED | OUTPATIENT
Start: 2024-05-06

## 2024-05-06 NOTE — PROGRESS NOTES
PMH Anxiety, Tobacco Abuse, is presenting in followup  ----  CARDIAC STUDIES  ----  PFTs 12/7/2023  Diffusion:   Abnormal Diffusion Capacity reduced to 69 % predicted Impression:   Reduced diffusion capacity indicating a decrease in alveolar surface area for gas exchange and Isolated reduction of  Functional Residual Capacity   Comment:   See technicians comments. Suggest clinical correlation   ----  Cardiac telemetry 11/20/2023  19 events were transmitted. 6 patient triggered; 13 auto triggered occurred during normal sinus rhythm  Patient monitored for 9d 22h 38m  PACs were not found during the monitoring period  PVCs were not found during the monitoring period  ----  Vascular duplex lower extremity venous bilateral 10/11/2023    No evidence of deep vein thrombosis in the bilateral lower extremities.    Posterior tibial artery Doppler waveforms are multiphasic bilaterally.  ----  EKG 7/20/2022  Normal sinus rhythm  ----  2d tte 4/12/2023    Left Ventricle: Normal left ventricular systolic function with a visually estimated EF of 55 - 60%. EF by 2D Simpsons Biplane is 55%. Left ventricle size is normal. Normal wall thickness. See diagram for wall motion findings. Normal diastolic function.  ----  4/12/2023 MPI    Stress Combined Conclusion: Normal pharmacological myocardial perfusion study.    Stress Function: Left ventricular function post-stress is normal. Post-stress ejection fraction is 64%.    Perfusion Comments: LV perfusion is normal.    Perfusion Conclusion: There is no evidence of transient ischemic dilation (TID).    ECG: Resting ECG demonstrates normal sinus rhythm.    ECG: The ECG was negative for ischemia.Symptoms of chest pain reported during stress test.    Stress Test: A pharmacological stress test was performed using lexiscan. Hemodynamics are adequate for diagnosis. Blood pressure demonstrated a normal response and heart rate demonstrated a normal response to stress. The patient's heart rate

## 2024-05-06 NOTE — PATIENT INSTRUCTIONS
Mediterranean diet may also include red wine with your meal--1 glass each day for women and up to 2 glasses a day for men.  Tips for eating at home  Use herbs, spices, garlic, lemon zest, and citrus juice instead of salt to add flavor to foods.  Add avocado slices to your sandwich instead of benoit.  Have fish for lunch or dinner instead of red meat. Brush the fish with olive oil, and broil or grill it.  Sprinkle your salad with seeds or nuts instead of cheese.  Cook with olive or canola oil instead of butter or oils that are high in saturated fat.  Switch from 2% milk or whole milk to 1% or fat-free milk.  Dip raw vegetables in a vinaigrette dressing or hummus instead of dips made from mayonnaise or sour cream.  Have a piece of fruit for dessert instead of a piece of cake. Try baked apples, or have some dried fruit.  Tips for eating out  Try broiled, grilled, baked, or poached fish instead of having it fried or breaded.  Ask your  to have your meals prepared with olive oil instead of butter.  Order dishes made with marinara sauce or sauces made from olive oil. Avoid sauces made from cream or mayonnaise.  Choose whole-grain breads, whole wheat pasta and pizza crust, brown rice, beans, and lentils.  Cut back on butter or margarine on bread. Instead, you can dip your bread in a small amount of olive oil.  Ask for a side salad or grilled vegetables instead of french fries or chips.  Where can you learn more?  Go to https://www.[employer].net/patientEd and enter O407 to learn more about \"Learning About the Mediterranean Diet.\"  Current as of: May 9, 2022               Content Version: 13.5  © 9056-8423 [employer].   Care instructions adapted under license by [employer]. If you have questions about a medical condition or this instruction, always ask your healthcare professional. [employer] disclaims any warranty or liability for your use of this information.

## 2024-05-06 NOTE — PROGRESS NOTES
Have you had Fatigue?  No     2.   Have you had have you had Chest Pain? No     3.   Have you had Dyspnea (SOB) ? Yes if so how long 8 weeks  can you walk 2 blocks or a flight of stairs without SOB? yes    4.   Have you had Orthopnea? No     5.   Have you had PND? No     6.   Have you had leg swelling? No     7.    Have you had any weight gain? No    8. Have you had any palpitations? Yes if so how long about a month or 2 how bad? 5/10     9. Have you had any syncope? No     10. Do you have any wounds on legs? No

## 2024-05-13 ENCOUNTER — PROCEDURE VISIT (OUTPATIENT)
Age: 54
End: 2024-05-13
Payer: MEDICAID

## 2024-05-13 ENCOUNTER — OFFICE VISIT (OUTPATIENT)
Age: 54
End: 2024-05-13
Payer: MEDICARE

## 2024-05-13 VITALS
HEART RATE: 80 BPM | OXYGEN SATURATION: 98 % | HEIGHT: 64 IN | SYSTOLIC BLOOD PRESSURE: 100 MMHG | DIASTOLIC BLOOD PRESSURE: 68 MMHG | WEIGHT: 166 LBS | BODY MASS INDEX: 28.34 KG/M2 | TEMPERATURE: 97.1 F

## 2024-05-13 VITALS — BODY MASS INDEX: 28.51 KG/M2 | WEIGHT: 167 LBS | HEIGHT: 64 IN

## 2024-05-13 DIAGNOSIS — R06.09 DYSPNEA ON EXERTION: Primary | ICD-10-CM

## 2024-05-13 DIAGNOSIS — M54.12 CERVICAL RADICULOPATHY: ICD-10-CM

## 2024-05-13 DIAGNOSIS — M48.02 CERVICAL SPINAL STENOSIS: ICD-10-CM

## 2024-05-13 DIAGNOSIS — M67.912 DISORDER OF LEFT ROTATOR CUFF: Primary | ICD-10-CM

## 2024-05-13 LAB
DLCO %PRED: NORMAL
DLCO PRED: NORMAL
DLCO/VA %PRED: NORMAL
DLCO/VA PRED: NORMAL
DLCO/VA: NORMAL
DLCO: NORMAL

## 2024-05-13 PROCEDURE — G8417 CALC BMI ABV UP PARAM F/U: HCPCS | Performed by: PHYSICAL MEDICINE & REHABILITATION

## 2024-05-13 PROCEDURE — 94010 BREATHING CAPACITY TEST: CPT | Performed by: INTERNAL MEDICINE

## 2024-05-13 PROCEDURE — 94729 DIFFUSING CAPACITY: CPT | Performed by: INTERNAL MEDICINE

## 2024-05-13 PROCEDURE — 94727 GAS DIL/WSHOT DETER LNG VOL: CPT | Performed by: INTERNAL MEDICINE

## 2024-05-13 PROCEDURE — 3017F COLORECTAL CA SCREEN DOC REV: CPT | Performed by: PHYSICAL MEDICINE & REHABILITATION

## 2024-05-13 PROCEDURE — 99214 OFFICE O/P EST MOD 30 MIN: CPT | Performed by: PHYSICAL MEDICINE & REHABILITATION

## 2024-05-13 PROCEDURE — 4004F PT TOBACCO SCREEN RCVD TLK: CPT | Performed by: PHYSICAL MEDICINE & REHABILITATION

## 2024-05-13 PROCEDURE — G8428 CUR MEDS NOT DOCUMENT: HCPCS | Performed by: PHYSICAL MEDICINE & REHABILITATION

## 2024-05-13 NOTE — PROGRESS NOTES
Diana Holguin presents today for   Chief Complaint   Patient presents with    Shoulder Pain     Left  still feeling painful       Is someone accompanying this pt? Yes, female    Is the patient using any DME equipment during OV? no      Coordination of Care:  1. Have you been to the ER, urgent care clinic since your last visit? Yes  Hospitalized since your last visit? no    2. Have you seen or consulted any other health care providers outside of the [facility] since your last visit? no Include any pap smears or colon screening. no    
HOURS, AS NEEDED      tranexamic acid (LYSTEDA) 650 MG TABS tablet        No current facility-administered medications for this visit.      Allergies:  No Known Allergies       Physical Exam     Vital Signs: /68 (Site: Right Upper Arm, Position: Sitting, Cuff Size: Medium Adult)   Pulse 80   Temp 97.1 °F (36.2 °C) (Skin)   Ht 1.626 m (5' 4\")   Wt 75.3 kg (166 lb)   LMP  (LMP Unknown)   SpO2 98% Comment: RA  BMI 28.49 kg/m²    General: ???????  female Body mass index is 28.49 kg/m². without any acute distress   Psychiatric: ?  Alert and oriented x 3 with normal mood    HEENT: ????????  Atraumatic   Respiratory:   Breathing non-labored and non dyspneic   CV: ???????????????? Peripheral pulses intact, no peripheral edema   Skin: ?????????????  No rashes       Neurologic: ??      Sensation: normal and grossly intact thebilateral, upper extremity(s)   Strength: 5/5 in the bilateral, upper extremity(s) except pain limited left shoulder abduction    Reflexes: reveals 2+ symmetric DTRs throughout UE  Gait: normal   Upper tract signs: Vivas's negative ?     Musculoskeletal: Shoulder Exam   Inspection:   Alignment:Normal  Atrophy: None   Effusion: None     Tenderness to Palpation:   Subacromial space: Positive  Long head of the biceps tendon: Positive  Periscapular muscles:Positive  Cervical/Thoracic paraspinals: None   Cervical/Thoracic spinous processes: None        ROM:   Shoulder Instability:Negative  Shoulder ROM: Abnormal left shoulder flexion limited to 90 degrees,   Shoulder Resisted ROM: Abnormal pain with shoulder IR/ER  Cervical Instability: None   Cervical ROM: No reproduction of pain with movement        Special Tests    Hawkin's Test: Positive  Neer's Test: Positive  Empty Can Test: Positive  Speed's Test:  Positive  Spurlings Maneuver: Positive  Hoffmans: Negative       Medical Decision Making:    Images: The imaging results as well as the actual images of the studies below were reviewed,

## 2024-05-15 NOTE — PROGRESS NOTES
Diana Holguin  : 1970  PCP: Todd Lind MD  5/15/2024    PROGRESS NOTE    HISTORY OF PRESENT ILLNESS    21  C/o low back pain radiating into the RLE. She reports numbness and tingling in her hands and feet. She has been seen by Dr. Bennett who  offered a surgical option. She was seen in the ED 21 with c/o intermittent low back pain x several weeks. Pt notes that she had a work injury  in 2018 when a chair broke and she fell backwards onto concrete. She attended PT with no benefit. She notes that Dr. Bennett provided L4-5 CONNIE, cervical ESIs  over the time span of a year () with temporary benefit.   Lumbar spine MRI dated 21 reviewed. Per report, Mild lateral recess stenosis  at L4-5. Elsewhere, no high-grade central canal stenosis at any lumbar level. Degenerative changes. She previously saw SOLO Araiza (and Dr. Ricks) for pain management,  who offered SI joint injections.  PLAN: Referral to Dr. Puga (Fibromyalgia management, pain psychology), Referral to Dr. Arteaga (Pain psychology)     10/4/23  C/o new symptom of neck pain radiating down to her left hand, most prominent in her 2nd, 3rd, and 4th digits. She also notes of a cramping sensation of her fingers of her left hand.   She was seen by ER on 23 for eval of left neck pain radiating down LUE x ~1 year.   Pt notes she was rx'd Prednisone steroid pack by ER.    Saw Dr. Georges on 23, where pt underwent bilateral knee injections.   PT (23-23; [facility]) attended for lumbar pain. TENS unit at PT provided significant relief. Notes she is starting PT for her cervical pain this week.   She currently takes Gabapentin 100 mg TID.  PLAN: increase Gabapentin 300 mg TID, DME for TENS unit w/ accessories     11/15/23  C/o neck pain that radiates to her left shoulder; notes diminished left shoulder ROM and pian with abduction.   She continues with PT (10/5/23-23; [facility]) with some benefit  Gabapentin 300 mg TID

## 2024-05-16 ENCOUNTER — OFFICE VISIT (OUTPATIENT)
Age: 54
End: 2024-05-16

## 2024-05-16 DIAGNOSIS — F17.200 CURRENT SMOKER: ICD-10-CM

## 2024-05-16 DIAGNOSIS — R06.00 DYSPNEA, UNSPECIFIED TYPE: Primary | ICD-10-CM

## 2024-05-16 DIAGNOSIS — Z87.891 PERSONAL HISTORY OF TOBACCO USE: ICD-10-CM

## 2024-05-16 ASSESSMENT — ENCOUNTER SYMPTOMS
CHOKING: 0
ABDOMINAL PAIN: 0
COLOR CHANGE: 0
VOICE CHANGE: 0
COUGH: 0
EYE PAIN: 0
EYE DISCHARGE: 0
SHORTNESS OF BREATH: 1
NAUSEA: 0
EYE ITCHING: 0
SORE THROAT: 0
CONSTIPATION: 0
EYE REDNESS: 0
RHINORRHEA: 0
BACK PAIN: 1
BLOOD IN STOOL: 0
APNEA: 0
VOMITING: 0
CHEST TIGHTNESS: 0
PHOTOPHOBIA: 0
STRIDOR: 0

## 2024-05-16 NOTE — PROGRESS NOTES
Diana Holguin presents today for   Chief Complaint   Patient presents with    New Patient     Referred by Dr.Thomas Prieto for CORNEJO    Results     PFT done 24       Is someone accompanying this pt? No    Is the patient using any DME equipment during OV? No    -DME Company NA    Depression Screenin/23/2024    12:34 PM   PHQ-9 Questionaire   Little interest or pleasure in doing things 0   Feeling down, depressed, or hopeless 0   PHQ-9 Total Score 0       Learning Needs Questionnaire:     Who is the primary learner? Patient    What is the preferred language for health care of the primary learner? ENGLISH    How does the primary learner prefer to learn new concepts? DEMONSTRATION    Answered By Patient    Relationship to Learner SELF          Fall Risk:         2024    12:34 PM   Fall Risk   2 or more falls in past year? no   Fall with injury in past year? no        Abuse Screening:          No data to display                  Coordination of Care:    1. Have you been to the ER, urgent care clinic since your last visit? Hospitalized since your last visit? No    2. Have you seen or consulted any other health care providers outside of the [facility] since your last visit? Include any pap smears or colon screening. PCP-Dr.Mansukhlal Lind    Medication list has been update per patient.        
Medicare only covers LDCT screening in patients aged 50-77 with at least a 20 pack-year smoking history who currently smoke or have quit in the last 15 years

## 2024-05-17 VITALS
OXYGEN SATURATION: 99 % | RESPIRATION RATE: 18 BRPM | DIASTOLIC BLOOD PRESSURE: 74 MMHG | WEIGHT: 164.4 LBS | HEART RATE: 91 BPM | TEMPERATURE: 97.9 F | BODY MASS INDEX: 28.22 KG/M2 | SYSTOLIC BLOOD PRESSURE: 136 MMHG

## 2024-05-18 ENCOUNTER — HOSPITAL ENCOUNTER (OUTPATIENT)
Facility: HOSPITAL | Age: 54
End: 2024-05-18
Attending: PHYSICAL MEDICINE & REHABILITATION
Payer: MEDICARE

## 2024-05-18 DIAGNOSIS — M67.912 DISORDER OF LEFT ROTATOR CUFF: ICD-10-CM

## 2024-05-18 PROCEDURE — 73221 MRI JOINT UPR EXTREM W/O DYE: CPT

## 2024-05-20 ENCOUNTER — HOSPITAL ENCOUNTER (OUTPATIENT)
Facility: HOSPITAL | Age: 54
Discharge: HOME | End: 2024-05-23
Attending: INTERNAL MEDICINE
Payer: MEDICARE

## 2024-05-20 DIAGNOSIS — Z87.891 PERSONAL HISTORY OF TOBACCO USE: ICD-10-CM

## 2024-05-20 PROCEDURE — 71271 CT THORAX LUNG CANCER SCR C-: CPT

## 2024-05-22 ENCOUNTER — OFFICE VISIT (OUTPATIENT)
Age: 54
End: 2024-05-22
Payer: MEDICARE

## 2024-05-22 VITALS
BODY MASS INDEX: 27.49 KG/M2 | HEART RATE: 70 BPM | HEIGHT: 64 IN | TEMPERATURE: 97.6 F | WEIGHT: 161 LBS | OXYGEN SATURATION: 100 %

## 2024-05-22 DIAGNOSIS — G89.29 CHRONIC PRIMARY MUSCULOSKELETAL PAIN: ICD-10-CM

## 2024-05-22 DIAGNOSIS — M79.18 CHRONIC PRIMARY MUSCULOSKELETAL PAIN: ICD-10-CM

## 2024-05-22 DIAGNOSIS — M47.816 LUMBAR FACET ARTHROPATHY: ICD-10-CM

## 2024-05-22 DIAGNOSIS — M54.50 LUMBAR PAIN: ICD-10-CM

## 2024-05-22 DIAGNOSIS — M54.12 CERVICAL RADICULOPATHY: Primary | ICD-10-CM

## 2024-05-22 DIAGNOSIS — M48.02 CERVICAL SPINAL STENOSIS: ICD-10-CM

## 2024-05-22 DIAGNOSIS — M79.18 CERVICAL MYOFASCIAL PAIN SYNDROME: ICD-10-CM

## 2024-05-22 PROCEDURE — 99213 OFFICE O/P EST LOW 20 MIN: CPT | Performed by: PHYSICAL MEDICINE & REHABILITATION

## 2024-05-22 PROCEDURE — 4004F PT TOBACCO SCREEN RCVD TLK: CPT | Performed by: PHYSICAL MEDICINE & REHABILITATION

## 2024-05-22 PROCEDURE — G8417 CALC BMI ABV UP PARAM F/U: HCPCS | Performed by: PHYSICAL MEDICINE & REHABILITATION

## 2024-05-22 PROCEDURE — G8427 DOCREV CUR MEDS BY ELIG CLIN: HCPCS | Performed by: PHYSICAL MEDICINE & REHABILITATION

## 2024-05-22 PROCEDURE — 3017F COLORECTAL CA SCREEN DOC REV: CPT | Performed by: PHYSICAL MEDICINE & REHABILITATION

## 2024-05-22 ASSESSMENT — ENCOUNTER SYMPTOMS
TROUBLE SWALLOWING: 0
SHORTNESS OF BREATH: 0
BACK PAIN: 1
NAUSEA: 0
WHEEZING: 0
VOMITING: 0

## 2024-05-22 NOTE — PROGRESS NOTES
[facility]  [facility], [address]  [place] [address]  Phone: [phone]  Fax: [phone]      Diana Holguin  : 1970  PCP: Todd Lind MD  2024    PROGRESS NOTE    HISTORY OF PRESENT ILLNESS    21  C/o low back pain radiating into the RLE. She reports numbness and tingling in her hands and feet. She has been seen by Dr. Bennett who  offered a surgical option. She was seen in the ED 21 with c/o intermittent low back pain x several weeks. Pt notes that she had a work injury  in 2018 when a chair broke and she fell backwards onto concrete. She attended PT with no benefit. She notes that Dr. Bennett provided L4-5 CONNIE, cervical ESIs  over the time span of a year () with temporary benefit.   Lumbar spine MRI dated 21 reviewed. Per report, Mild lateral recess stenosis  at L4-5. Elsewhere, no high-grade central canal stenosis at any lumbar level. Degenerative changes. She previously saw SOLO Araiza (and Dr. Ricks) for pain management,  who offered SI joint injections.  PLAN: Referral to Dr. Puga (Fibromyalgia management, pain psychology), Referral to Dr. Arteaga (Pain psychology)     10/4/23  C/o new symptom of neck pain radiating down to her left hand, most prominent in her 2nd, 3rd, and 4th digits. She also notes of a cramping sensation of her fingers of her left hand.   She was seen by ER on 23 for eval of left neck pain radiating down LUE x ~1 year.   Pt notes she was rx'd Prednisone steroid pack by ER.    Saw Dr. Georges on 23, where pt underwent bilateral knee injections.   PT (23-23; [facility]) attended for lumbar pain. TENS unit at PT provided significant relief. Notes she is starting PT for her cervical pain this week.   She currently takes Gabapentin 100 mg TID.  PLAN: increase Gabapentin 300 mg TID, DME for TENS unit w/ accessories     11/15/23  C/o neck pain that radiates to her left shoulder;

## 2024-05-23 NOTE — PROGRESS NOTES
Patient: Diana Holguin                MRN: [id number]       SSN: [id number]  YOB: 1970        AGE: 54 y.o.        SEX: female      PCP: Todd Lind MD  24    Chief Complaint   Patient presents with    Knee Pain     Bilateral knee Gelsyn 3 #1     HISTORY:  Diana Holguin is a 54 y.o. female who is seen for bilateral knee pain. She presents today for her first injection in the Gelsyn-3 visco supplementation series.    She was previously seen for increased bilateral knee pain.  She successfully completed a bilateral Gelsyn-3 series on 10/16/23 but her pains have returned. Her pain has increased within the last month. She denies injury and believes her pain is due to wear and tear.  She feels pain with standing, walking and stair climbing.  She experiences startup pain after sitting.     She was previously seen by Dr. Turcios and Dr. Davis for chronic back and neck pain. She states that she has 5 bulging lumbar discs.      Occupation, etc: Ms. Holguin receives Social Security Disability benefits for fibromyalgia, PTSD and bipolar disorder. 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 She lives with her adult daughter in [place]. She also has adult two sons and three grandchildren. She raised her three children as a single mother.  Wt Readings from Last 3 Encounters:   24 73 kg (161 lb)   24 73 kg (161 lb)   24 74.6 kg (164 lb 6.4 oz)      Body mass index is 27.64 kg/m².    Patient Active Problem List   Diagnosis    Murmur    Backache    SOB (shortness of breath)       Social History     Tobacco Use    Smoking status: Former     Current packs/day: 0.00     Average packs/day: 2.5 packs/day for 20.0 years (50.0 ttl pk-yrs)     Types: Cigarettes     Quit date: 2013     Years since quittin.3    Smokeless tobacco: Current    Tobacco comments:     Quit smoking: vaping   Vaping Use    Vaping Use:

## 2024-05-29 ENCOUNTER — TELEPHONE (OUTPATIENT)
Age: 54
End: 2024-05-29

## 2024-05-29 ENCOUNTER — OFFICE VISIT (OUTPATIENT)
Age: 54
End: 2024-05-29
Payer: MEDICARE

## 2024-05-29 VITALS — BODY MASS INDEX: 27.49 KG/M2 | TEMPERATURE: 97 F | HEIGHT: 64 IN | WEIGHT: 161 LBS

## 2024-05-29 DIAGNOSIS — R21 RASH: ICD-10-CM

## 2024-05-29 DIAGNOSIS — M17.11 UNILATERAL PRIMARY OSTEOARTHRITIS, RIGHT KNEE: ICD-10-CM

## 2024-05-29 DIAGNOSIS — M17.12 UNILATERAL PRIMARY OSTEOARTHRITIS, LEFT KNEE: Primary | ICD-10-CM

## 2024-05-29 PROCEDURE — 20610 DRAIN/INJ JOINT/BURSA W/O US: CPT | Performed by: SPECIALIST

## 2024-05-29 NOTE — TELEPHONE ENCOUNTER
Patient called and said that  had referred her to an Anesthesiologist   Dr.Brian Klein for her Neck.    Patient said that Dr. Klein does not accept her [facility] [employer] insurance.    Patient is asking to be referred to someone else that does accept her insurance.    Patient tel. [phone].

## 2024-05-30 NOTE — TELEPHONE ENCOUNTER
I contacted Ms Holguin to let her know the referral had already been reprocessed to [facility], however, I am not sure if they are INN with her insurance. I provided their phone number to her and she was instructed to call back if she had any more difficulty. She expressed her understanding. Thank you.

## 2024-06-10 ENCOUNTER — OFFICE VISIT (OUTPATIENT)
Facility: CLINIC | Age: 54
End: 2024-06-10
Payer: MEDICARE

## 2024-06-10 ENCOUNTER — HOSPITAL ENCOUNTER (OUTPATIENT)
Facility: HOSPITAL | Age: 54
Discharge: HOME | End: 2024-06-13
Payer: MEDICARE

## 2024-06-10 VITALS
BODY MASS INDEX: 27.14 KG/M2 | HEART RATE: 74 BPM | OXYGEN SATURATION: 98 % | SYSTOLIC BLOOD PRESSURE: 122 MMHG | HEIGHT: 64 IN | DIASTOLIC BLOOD PRESSURE: 74 MMHG | WEIGHT: 159 LBS | RESPIRATION RATE: 16 BRPM | TEMPERATURE: 98 F

## 2024-06-10 DIAGNOSIS — Z00.00 HEALTHCARE MAINTENANCE: ICD-10-CM

## 2024-06-10 DIAGNOSIS — R06.83 SNORES: ICD-10-CM

## 2024-06-10 DIAGNOSIS — Z12.4 CERVICAL CANCER SCREENING: ICD-10-CM

## 2024-06-10 DIAGNOSIS — Z72.0 TOBACCO USE: ICD-10-CM

## 2024-06-10 DIAGNOSIS — Z13.31 POSITIVE DEPRESSION SCREENING: ICD-10-CM

## 2024-06-10 DIAGNOSIS — Z76.89 ENCOUNTER TO ESTABLISH CARE: Primary | ICD-10-CM

## 2024-06-10 DIAGNOSIS — G89.29 CHRONIC PAIN OF LEFT ANKLE: ICD-10-CM

## 2024-06-10 DIAGNOSIS — M25.572 CHRONIC PAIN OF LEFT ANKLE: ICD-10-CM

## 2024-06-10 DIAGNOSIS — D64.9 ANEMIA, UNSPECIFIED TYPE: ICD-10-CM

## 2024-06-10 PROBLEM — R06.02 SOB (SHORTNESS OF BREATH): Status: RESOLVED | Noted: 2023-12-07 | Resolved: 2024-06-10

## 2024-06-10 PROCEDURE — G8427 DOCREV CUR MEDS BY ELIG CLIN: HCPCS | Performed by: FAMILY MEDICINE

## 2024-06-10 PROCEDURE — 99204 OFFICE O/P NEW MOD 45 MIN: CPT | Performed by: FAMILY MEDICINE

## 2024-06-10 PROCEDURE — 73650 X-RAY EXAM OF HEEL: CPT

## 2024-06-10 PROCEDURE — G8417 CALC BMI ABV UP PARAM F/U: HCPCS | Performed by: FAMILY MEDICINE

## 2024-06-10 PROCEDURE — 3017F COLORECTAL CA SCREEN DOC REV: CPT | Performed by: FAMILY MEDICINE

## 2024-06-10 PROCEDURE — 4004F PT TOBACCO SCREEN RCVD TLK: CPT | Performed by: FAMILY MEDICINE

## 2024-06-10 PROCEDURE — 73600 X-RAY EXAM OF ANKLE: CPT

## 2024-06-10 RX ORDER — METHOCARBAMOL 500 MG/1
500 TABLET, FILM COATED ORAL 3 TIMES DAILY
COMMUNITY
Start: 2024-05-30

## 2024-06-10 SDOH — ECONOMIC STABILITY: HOUSING INSECURITY
IN THE LAST 12 MONTHS, WAS THERE A TIME WHEN YOU DID NOT HAVE A STEADY PLACE TO SLEEP OR SLEPT IN A SHELTER (INCLUDING NOW)?: NO

## 2024-06-10 SDOH — ECONOMIC STABILITY: FOOD INSECURITY: WITHIN THE PAST 12 MONTHS, YOU WORRIED THAT YOUR FOOD WOULD RUN OUT BEFORE YOU GOT MONEY TO BUY MORE.: OFTEN TRUE

## 2024-06-10 SDOH — ECONOMIC STABILITY: INCOME INSECURITY: HOW HARD IS IT FOR YOU TO PAY FOR THE VERY BASICS LIKE FOOD, HOUSING, MEDICAL CARE, AND HEATING?: VERY HARD

## 2024-06-10 SDOH — ECONOMIC STABILITY: FOOD INSECURITY: WITHIN THE PAST 12 MONTHS, THE FOOD YOU BOUGHT JUST DIDN'T LAST AND YOU DIDN'T HAVE MONEY TO GET MORE.: OFTEN TRUE

## 2024-06-10 ASSESSMENT — PATIENT HEALTH QUESTIONNAIRE - PHQ9
SUM OF ALL RESPONSES TO PHQ QUESTIONS 1-9: 11
5. POOR APPETITE OR OVEREATING: SEVERAL DAYS
SUM OF ALL RESPONSES TO PHQ9 QUESTIONS 1 & 2: 0
SUM OF ALL RESPONSES TO PHQ QUESTIONS 1-9: 11
6. FEELING BAD ABOUT YOURSELF - OR THAT YOU ARE A FAILURE OR HAVE LET YOURSELF OR YOUR FAMILY DOWN: NOT AT ALL
7. TROUBLE CONCENTRATING ON THINGS, SUCH AS READING THE NEWSPAPER OR WATCHING TELEVISION: MORE THAN HALF THE DAYS
8. MOVING OR SPEAKING SO SLOWLY THAT OTHER PEOPLE COULD HAVE NOTICED. OR THE OPPOSITE, BEING SO FIGETY OR RESTLESS THAT YOU HAVE BEEN MOVING AROUND A LOT MORE THAN USUAL: MORE THAN HALF THE DAYS
9. THOUGHTS THAT YOU WOULD BE BETTER OFF DEAD, OR OF HURTING YOURSELF: NOT AT ALL
SUM OF ALL RESPONSES TO PHQ QUESTIONS 1-9: 11
2. FEELING DOWN, DEPRESSED OR HOPELESS: NOT AT ALL
1. LITTLE INTEREST OR PLEASURE IN DOING THINGS: NOT AT ALL
3. TROUBLE FALLING OR STAYING ASLEEP: NEARLY EVERY DAY
4. FEELING TIRED OR HAVING LITTLE ENERGY: NEARLY EVERY DAY
SUM OF ALL RESPONSES TO PHQ QUESTIONS 1-9: 11
10. IF YOU CHECKED OFF ANY PROBLEMS, HOW DIFFICULT HAVE THESE PROBLEMS MADE IT FOR YOU TO DO YOUR WORK, TAKE CARE OF THINGS AT HOME, OR GET ALONG WITH OTHER PEOPLE: SOMEWHAT DIFFICULT

## 2024-06-10 ASSESSMENT — ENCOUNTER SYMPTOMS
ABDOMINAL PAIN: 0
COUGH: 0
DIARRHEA: 0
CONSTIPATION: 0
SHORTNESS OF BREATH: 0
EYE PAIN: 0

## 2024-06-10 NOTE — PROGRESS NOTES
Patient: Diana Holguin                MRN: [id number]       SSN: [id number]  YOB: 1970        AGE: 54 y.o.        SEX: female  Body mass index is 27.29 kg/m².    PCP: Todd Lind MD  06/12/24    Chief Complaint   Patient presents with    Knee Pain     Bilateral knee Gelsyn 3 #2     HISTORY:  Diana Holguin is a 54 y.o. female who is seen for bilateral knee pain. She presents today for her second injection in the Gelsyn-3 visco supplementation series.      ICD-10-CM    1. Unilateral primary osteoarthritis, left knee  M17.12 DRAIN/INJECT LARGE JOINT/BURSA     sodium hyaluronate (Viscosup) injection SOSY 16.8 mg      2. Unilateral primary osteoarthritis, right knee  M17.11 DRAIN/INJECT LARGE JOINT/BURSA     sodium hyaluronate (Viscosup) injection SOSY 16.8 mg         PROCEDURE:  Ms. Espinoza knees injected with 2 cc of Gelsyn-3.     Chart reviewed for the following:   Puneet LILLY MD, have reviewed the History, Physical and updated the Allergic reactions for Diana Holguin     TIME OUT performed immediately prior to start of procedure:  Puneet LILLY MD, have performed the following reviews on Diana Holguin prior to the start of the procedure:            * Patient was identified by name and date of birth   * Agreement on procedure being performed was verified  * Risks and Benefits explained to the patient  * Procedure site verified and marked as necessary  * Patient was positioned for comfort  * Consent was obtained     Time: 11:42 AM     Date of procedure: 6/12/2024    Procedure performed by:  Puneet Georges MD    Ms. Holguin tolerated the procedure well with no complications.    PLAN: Ms. Holguin's knees injected with 2 cc of Gelsyn-3. Ms. Holguin will follow up in one week to complete her visco supplementation injection series.    Documentation by aide Flores, as documented by Puneet Georges MD.

## 2024-06-10 NOTE — PROGRESS NOTES
\"Have you been to the ER, urgent care clinic since your last visit?  Hospitalized since your last visit?\"    NO    “Have you seen or consulted any other health care providers outside of [facility] since your last visit?”    NO                Click Here for Release of Records Request  
Tympanic membrane, ear canal and external ear normal.      Left Ear: Tympanic membrane, ear canal and external ear normal.      Nose: Nose normal. No congestion.      Mouth/Throat:      Mouth: Mucous membranes are moist.      Pharynx: Oropharynx is clear. No oropharyngeal exudate.   Eyes:      Extraocular Movements: Extraocular movements intact.      Conjunctiva/sclera: Conjunctivae normal.      Pupils: Pupils are equal, round, and reactive to light.   Cardiovascular:      Rate and Rhythm: Normal rate and regular rhythm.      Pulses: Normal pulses.      Heart sounds: Normal heart sounds.   Pulmonary:      Effort: Pulmonary effort is normal. No respiratory distress.      Breath sounds: Normal breath sounds. No stridor. No wheezing, rhonchi or rales.   Abdominal:      General: Bowel sounds are normal.      Palpations: Abdomen is soft.      Tenderness: There is no abdominal tenderness.   Musculoskeletal:         General: Normal range of motion.      Cervical back: Normal range of motion and neck supple.      Right lower leg: No edema.      Left lower leg: No edema.        Feet:    Feet:      Comments: Tenderness to palpation   Skin:     General: Skin is warm.      Capillary Refill: Capillary refill takes less than 2 seconds.      Findings: No erythema or rash.   Neurological:      General: No focal deficit present.      Mental Status: She is alert and oriented to person, place, and time. Mental status is at baseline.      Sensory: No sensory deficit.      Motor: No weakness.      Coordination: Coordination normal.      Gait: Gait normal.      Deep Tendon Reflexes: Reflexes normal.   Psychiatric:         Mood and Affect: Mood normal.         Behavior: Behavior normal.         Thought Content: Thought content normal.         Judgment: Judgment normal.         Immunization History   Administered Date(s) Administered    Tst, Unspecified Formulation 01/01/2011       Health Maintenance   Topic Date Due    Hepatitis B vaccine

## 2024-06-10 NOTE — PATIENT INSTRUCTIONS
professional. [employer] disclaims any warranty or liability for your use of this information.

## 2024-06-12 ENCOUNTER — OFFICE VISIT (OUTPATIENT)
Age: 54
End: 2024-06-12
Payer: MEDICARE

## 2024-06-12 VITALS — WEIGHT: 159 LBS | TEMPERATURE: 97 F | BODY MASS INDEX: 27.14 KG/M2 | HEIGHT: 64 IN

## 2024-06-12 DIAGNOSIS — M17.12 UNILATERAL PRIMARY OSTEOARTHRITIS, LEFT KNEE: Primary | ICD-10-CM

## 2024-06-12 DIAGNOSIS — M17.11 UNILATERAL PRIMARY OSTEOARTHRITIS, RIGHT KNEE: ICD-10-CM

## 2024-06-12 PROCEDURE — 20610 DRAIN/INJ JOINT/BURSA W/O US: CPT | Performed by: SPECIALIST

## 2024-06-13 ENCOUNTER — HOSPITAL ENCOUNTER (OUTPATIENT)
Facility: HOSPITAL | Age: 54
Discharge: HOME | End: 2024-06-13
Payer: MEDICARE

## 2024-06-13 DIAGNOSIS — Z00.00 HEALTHCARE MAINTENANCE: ICD-10-CM

## 2024-06-13 DIAGNOSIS — D64.9 ANEMIA, UNSPECIFIED TYPE: ICD-10-CM

## 2024-06-13 LAB
CHOLEST SERPL-MCNC: 212 MG/DL
ERYTHROCYTE [DISTWIDTH] IN BLOOD BY AUTOMATED COUNT: 14.9 % (ref 11.6–14.5)
EST. AVERAGE GLUCOSE BLD GHB EST-MCNC: 126 MG/DL
FERRITIN SERPL-MCNC: 4 NG/ML (ref 8–388)
HBA1C MFR BLD: 6 % (ref 4.2–5.6)
HCT VFR BLD AUTO: 38.9 % (ref 35–45)
HDLC SERPL-MCNC: 46 MG/DL (ref 40–60)
HDLC SERPL: 4.6 (ref 0–5)
HGB BLD-MCNC: 11.8 G/DL (ref 12–16)
IRON SATN MFR SERPL: 6 % (ref 20–50)
IRON SERPL-MCNC: 30 UG/DL (ref 50–175)
LDLC SERPL CALC-MCNC: 129.2 MG/DL (ref 0–100)
LIPID PANEL: ABNORMAL
MCH RBC QN AUTO: 25.8 PG (ref 24–34)
MCHC RBC AUTO-ENTMCNC: 30.3 G/DL (ref 31–37)
MCV RBC AUTO: 84.9 FL (ref 78–100)
NRBC # BLD: 0 K/UL (ref 0–0.01)
NRBC BLD-RTO: 0 PER 100 WBC
PLATELET # BLD AUTO: 304 K/UL (ref 135–420)
PMV BLD AUTO: 11.1 FL (ref 9.2–11.8)
RBC # BLD AUTO: 4.58 M/UL (ref 4.2–5.3)
TIBC SERPL-MCNC: 510 UG/DL (ref 250–450)
TRIGL SERPL-MCNC: 184 MG/DL
VLDLC SERPL CALC-MCNC: 36.8 MG/DL
WBC # BLD AUTO: 5.8 K/UL (ref 4.6–13.2)

## 2024-06-13 PROCEDURE — 80061 LIPID PANEL: CPT

## 2024-06-13 PROCEDURE — 36415 COLL VENOUS BLD VENIPUNCTURE: CPT

## 2024-06-13 PROCEDURE — 85027 COMPLETE CBC AUTOMATED: CPT

## 2024-06-13 PROCEDURE — 82728 ASSAY OF FERRITIN: CPT

## 2024-06-13 PROCEDURE — 84466 ASSAY OF TRANSFERRIN: CPT

## 2024-06-13 PROCEDURE — 83036 HEMOGLOBIN GLYCOSYLATED A1C: CPT

## 2024-06-13 PROCEDURE — 83540 ASSAY OF IRON: CPT

## 2024-06-14 DIAGNOSIS — D50.9 IRON DEFICIENCY ANEMIA, UNSPECIFIED IRON DEFICIENCY ANEMIA TYPE: Primary | ICD-10-CM

## 2024-06-14 RX ORDER — FERROUS SULFATE 325(65) MG
325 TABLET ORAL 2 TIMES DAILY
Qty: 60 TABLET | Refills: 3 | Status: SHIPPED | OUTPATIENT
Start: 2024-06-14

## 2024-06-15 LAB — TRANSFERRIN SERPL-MCNC: 397 MG/DL (ref 192–364)

## 2024-06-19 NOTE — PROGRESS NOTES
2024    TELEHEALTH EVALUATION -- Audio/Visual (During COVID-19 public health emergency)    HPI:    Diana Holguin (:  1970) has requested an audio/video evaluation for the following concern(s):    Patient did not read XR results message sent on  nor the results message sent on .   Discussed both in depth and answered all questions.     LAN: started iron supplement  HLD: will work on life style changes   A1c 6%: will work on life style changes     Review of Systems   Constitutional:  Negative for appetite change, chills, fatigue and fever.   HENT:  Negative for congestion.    Eyes:  Negative for pain and visual disturbance.   Respiratory:  Negative for cough and shortness of breath.    Cardiovascular:  Negative for chest pain and palpitations.   Gastrointestinal:  Negative for abdominal pain.   Musculoskeletal:  Positive for arthralgias, joint swelling and myalgias.   Neurological:  Negative for weakness and headaches.   Hematological:  Does not bruise/bleed easily.   Psychiatric/Behavioral:  The patient is not nervous/anxious.        Prior to Visit Medications    Medication Sig Taking? Authorizing Provider   ferrous sulfate (IRON 325) 325 (65 Fe) MG tablet Take 1 tablet by mouth 2 times daily Yes Magalys Giang MD   methocarbamol (ROBAXIN) 500 MG tablet Take 1 tablet by mouth 3 times daily Yes ProviderGrant MD   albuterol sulfate HFA (VENTOLIN HFA) 108 (90 Base) MCG/ACT inhaler Inhale 2 puffs into the lungs 4 times daily as needed for Wheezing Yes Tenzin Prieto MD   ibuprofen (ADVIL;MOTRIN) 600 MG tablet Take 1 tablet by mouth 3 times daily as needed for Pain Yes Gaetano Love MD   triamcinolone (KENALOG) 0.1 % cream  Yes ProviderGrant MD   QUEtiapine (SEROQUEL) 300 MG tablet Take 1 tablet by mouth 2 times daily Yes ProviderGarnt MD   BACLOFEN PO Take by mouth Yes ProviderGrant MD   rizatriptan (MAXALT-MLT) 5 MG disintegrating tablet DISSOLVE 1

## 2024-06-19 NOTE — PROGRESS NOTES
Patient: Diana Holguin                MRN: [id number]       SSN: [id number]  YOB: 1970        AGE: 54 y.o.        SEX: female  Body mass index is 26.46 kg/m².    PCP: Magalys Giang MD  06/21/24    Chief Complaint   Patient presents with    Knee Pain     Bilateral knee Gelsyn 3 #3       HISTORY:  Diana Holguin is a 54 y.o. female who is seen for bilateral knee pain. She presents today for her third injection in the Gelsyn-3 visco supplementation series.    PROCEDURE:  Ms. Espinoza knees injected with 2 cc of Gelsyn-3.     TIME OUT performed immediately prior to start of procedure:  I, Puneet Georges MD, have performed the following reviews on Diana Holguin prior to the start of the procedure:            * Patient was identified by name and date of birth   * Agreement on procedure being performed was verified  * Risks and Benefits explained to the patient  * Procedure site verified and marked as necessary  * Patient was positioned for comfort  * Consent was obtained     Time: 11:22 AM     Date of procedure: 6/21/2024    Procedure performed by:  Puneet Georges MD    Ms. Holguin tolerated the procedure well with no complications      ICD-10-CM    1. Unilateral primary osteoarthritis, left knee  M17.12 DRAIN/INJECT LARGE JOINT/BURSA     sodium hyaluronate (Viscosup) injection SOSY 16.8 mg      2. Unilateral primary osteoarthritis, right knee  M17.11 DRAIN/INJECT LARGE JOINT/BURSA     sodium hyaluronate (Viscosup) injection SOSY 16.8 mg        PLAN: Ms. Espinoza knees injected with 2 cc of Gelsyn-3. Ms. Holguin will follow up PRN now that she has completed her visco supplementation injection series.     Documentation by aide Flores, as documented by Puneet Georges MD.

## 2024-06-20 ENCOUNTER — TELEMEDICINE (OUTPATIENT)
Facility: CLINIC | Age: 54
End: 2024-06-20
Payer: MEDICARE

## 2024-06-20 DIAGNOSIS — G89.29 CHRONIC PAIN OF LEFT ANKLE: Primary | ICD-10-CM

## 2024-06-20 DIAGNOSIS — M25.572 CHRONIC PAIN OF LEFT ANKLE: Primary | ICD-10-CM

## 2024-06-20 DIAGNOSIS — R73.03 PREDIABETES: ICD-10-CM

## 2024-06-20 DIAGNOSIS — E78.2 MIXED HYPERLIPIDEMIA: ICD-10-CM

## 2024-06-20 DIAGNOSIS — D64.9 ANEMIA, UNSPECIFIED TYPE: ICD-10-CM

## 2024-06-20 PROCEDURE — 4004F PT TOBACCO SCREEN RCVD TLK: CPT | Performed by: FAMILY MEDICINE

## 2024-06-20 PROCEDURE — 99214 OFFICE O/P EST MOD 30 MIN: CPT | Performed by: FAMILY MEDICINE

## 2024-06-20 PROCEDURE — 3017F COLORECTAL CA SCREEN DOC REV: CPT | Performed by: FAMILY MEDICINE

## 2024-06-20 PROCEDURE — G8417 CALC BMI ABV UP PARAM F/U: HCPCS | Performed by: FAMILY MEDICINE

## 2024-06-20 PROCEDURE — G8427 DOCREV CUR MEDS BY ELIG CLIN: HCPCS | Performed by: FAMILY MEDICINE

## 2024-06-20 ASSESSMENT — PATIENT HEALTH QUESTIONNAIRE - PHQ9
SUM OF ALL RESPONSES TO PHQ QUESTIONS 1-9: 17
SUM OF ALL RESPONSES TO PHQ9 QUESTIONS 1 & 2: 4
3. TROUBLE FALLING OR STAYING ASLEEP: NEARLY EVERY DAY
9. THOUGHTS THAT YOU WOULD BE BETTER OFF DEAD, OR OF HURTING YOURSELF: NOT AT ALL
5. POOR APPETITE OR OVEREATING: SEVERAL DAYS
4. FEELING TIRED OR HAVING LITTLE ENERGY: NEARLY EVERY DAY
6. FEELING BAD ABOUT YOURSELF - OR THAT YOU ARE A FAILURE OR HAVE LET YOURSELF OR YOUR FAMILY DOWN: SEVERAL DAYS
8. MOVING OR SPEAKING SO SLOWLY THAT OTHER PEOPLE COULD HAVE NOTICED. OR THE OPPOSITE, BEING SO FIGETY OR RESTLESS THAT YOU HAVE BEEN MOVING AROUND A LOT MORE THAN USUAL: NEARLY EVERY DAY
SUM OF ALL RESPONSES TO PHQ QUESTIONS 1-9: 17
1. LITTLE INTEREST OR PLEASURE IN DOING THINGS: NEARLY EVERY DAY
10. IF YOU CHECKED OFF ANY PROBLEMS, HOW DIFFICULT HAVE THESE PROBLEMS MADE IT FOR YOU TO DO YOUR WORK, TAKE CARE OF THINGS AT HOME, OR GET ALONG WITH OTHER PEOPLE: VERY DIFFICULT
SUM OF ALL RESPONSES TO PHQ QUESTIONS 1-9: 17
2. FEELING DOWN, DEPRESSED OR HOPELESS: SEVERAL DAYS
7. TROUBLE CONCENTRATING ON THINGS, SUCH AS READING THE NEWSPAPER OR WATCHING TELEVISION: MORE THAN HALF THE DAYS
SUM OF ALL RESPONSES TO PHQ QUESTIONS 1-9: 17

## 2024-06-20 ASSESSMENT — ENCOUNTER SYMPTOMS
SHORTNESS OF BREATH: 0
EYE PAIN: 0
COUGH: 0
ABDOMINAL PAIN: 0

## 2024-06-20 NOTE — PATIENT INSTRUCTIONS

## 2024-06-20 NOTE — PROGRESS NOTES
\"Have you been to the ER, urgent care clinic since your last visit?  Hospitalized since your last visit?\"    NO    “Have you seen or consulted any other health care providers outside of [facility] since your last visit?”    NO            Click Here for Release of Records Request

## 2024-06-21 ENCOUNTER — OFFICE VISIT (OUTPATIENT)
Age: 54
End: 2024-06-21

## 2024-06-21 VITALS — HEIGHT: 65 IN | BODY MASS INDEX: 26.49 KG/M2 | TEMPERATURE: 97 F | WEIGHT: 159 LBS

## 2024-06-21 DIAGNOSIS — M17.12 UNILATERAL PRIMARY OSTEOARTHRITIS, LEFT KNEE: Primary | ICD-10-CM

## 2024-06-21 DIAGNOSIS — M17.11 UNILATERAL PRIMARY OSTEOARTHRITIS, RIGHT KNEE: ICD-10-CM

## 2024-06-26 ENCOUNTER — TELEMEDICINE (OUTPATIENT)
Age: 54
End: 2024-06-26
Payer: MEDICARE

## 2024-06-26 DIAGNOSIS — M67.912 DISORDER OF LEFT ROTATOR CUFF: Primary | ICD-10-CM

## 2024-06-26 PROCEDURE — 99442 PR PHYS/QHP TELEPHONE EVALUATION 11-20 MIN: CPT | Performed by: PHYSICAL MEDICINE & REHABILITATION

## 2024-06-26 NOTE — PROGRESS NOTES
Diana Holguin presents today for   Chief Complaint   Patient presents with    Pain    Arm Pain       Is someone accompanying this pt? no    Is the patient using any DME equipment during OV? no    Depression Screening:       No data to display                Learning Assessment:  Failed to redirect to the Timeline version of the [employer] Smart[employer].    Abuse Screening:       No data to display                Fall Risk  Failed to redirect to the Timeline version of the [employer] Smart[employer].    OPIOID RISK TOOL  Failed to redirect to the Timeline version of the [employer] Smart[employer].    Coordination of Care:  1. Have you been to the ER, urgent care clinic since your last visit? no  Hospitalized since your last visit? no    2. Have you seen or consulted any other health care providers outside of the [facility] since your last visit? no Include any pap smears or colon screening. no      
provided:     Total Time: minutes: 11-20 minutes 13    Diana Holguin was evaluated through a synchronous (real-time) audio encounter. Patient identification was verified at the start of the visit. She (or guardian if applicable) is aware that this is a billable service, which includes applicable co-pays. This visit was conducted with the patient's (and/or legal guardian's) verbal consent. She has not had a related appointment within my department in the past 7 days or scheduled within the next 24 hours.   The patient was located at Home: [address]  [place] [address].  The provider was located at Facility (Appt Dept): [address] [place] [address]  [address]  [place] [address].    Note: not billable if this call serves to triage the patient into an appointment for the relevant concern  Yes, I confirm.   Diana Holguin is a 54 y.o. female evaluated via telephone on 6/26/2024 for Pain and Arm Pain  .        Nate Davis MD

## 2024-07-17 ENCOUNTER — TELEPHONE (OUTPATIENT)
Age: 54
End: 2024-07-17

## 2024-07-17 NOTE — TELEPHONE ENCOUNTER
Pt was ordered CPET which is only done @ [facility] unable to reach but LVM with number to reach [facility] Central Scheduling to schedule test.

## 2024-07-17 NOTE — TELEPHONE ENCOUNTER
Pt called in regards to see if nurse can call and [facility] stress test Dr. MEDINA put in in May. Pt was directed to call central [facility], however they directed her to 3 different departments which none of them were correct so the test still hasn't been taken. Pls advise. Pt has f/u 7/25/24. Pls call her a [phone]

## 2024-08-30 ENCOUNTER — PATIENT MESSAGE (OUTPATIENT)
Facility: CLINIC | Age: 54
End: 2024-08-30

## 2024-09-25 ENCOUNTER — OFFICE VISIT (OUTPATIENT)
Age: 54
End: 2024-09-25
Payer: MEDICARE

## 2024-09-25 ENCOUNTER — TELEPHONE (OUTPATIENT)
Age: 54
End: 2024-09-25

## 2024-09-25 VITALS — HEIGHT: 65 IN | BODY MASS INDEX: 26.46 KG/M2 | RESPIRATION RATE: 16 BRPM | HEART RATE: 75 BPM

## 2024-09-25 DIAGNOSIS — M79.18 CERVICAL MYOFASCIAL PAIN SYNDROME: ICD-10-CM

## 2024-09-25 DIAGNOSIS — G89.29 CHRONIC PRIMARY MUSCULOSKELETAL PAIN: ICD-10-CM

## 2024-09-25 DIAGNOSIS — M47.816 LUMBAR FACET ARTHROPATHY: ICD-10-CM

## 2024-09-25 DIAGNOSIS — M54.12 CERVICAL RADICULOPATHY: Primary | ICD-10-CM

## 2024-09-25 DIAGNOSIS — M48.02 CERVICAL SPINAL STENOSIS: ICD-10-CM

## 2024-09-25 DIAGNOSIS — M54.50 LUMBAR PAIN: ICD-10-CM

## 2024-09-25 DIAGNOSIS — R06.00 DYSPNEA, UNSPECIFIED TYPE: Primary | ICD-10-CM

## 2024-09-25 DIAGNOSIS — M79.18 CHRONIC PRIMARY MUSCULOSKELETAL PAIN: ICD-10-CM

## 2024-09-25 PROCEDURE — G8427 DOCREV CUR MEDS BY ELIG CLIN: HCPCS | Performed by: PHYSICAL MEDICINE & REHABILITATION

## 2024-09-25 PROCEDURE — 4004F PT TOBACCO SCREEN RCVD TLK: CPT | Performed by: PHYSICAL MEDICINE & REHABILITATION

## 2024-09-25 PROCEDURE — G8417 CALC BMI ABV UP PARAM F/U: HCPCS | Performed by: PHYSICAL MEDICINE & REHABILITATION

## 2024-09-25 PROCEDURE — 3017F COLORECTAL CA SCREEN DOC REV: CPT | Performed by: PHYSICAL MEDICINE & REHABILITATION

## 2024-09-25 PROCEDURE — 99213 OFFICE O/P EST LOW 20 MIN: CPT | Performed by: PHYSICAL MEDICINE & REHABILITATION

## 2024-09-25 ASSESSMENT — ENCOUNTER SYMPTOMS
SHORTNESS OF BREATH: 0
VOMITING: 0
NAUSEA: 0
BACK PAIN: 1
TROUBLE SWALLOWING: 0
WHEEZING: 0

## 2024-10-28 ENCOUNTER — OFFICE VISIT (OUTPATIENT)
Age: 54
End: 2024-10-28
Payer: MEDICARE

## 2024-10-28 VITALS
RESPIRATION RATE: 18 BRPM | TEMPERATURE: 97.5 F | SYSTOLIC BLOOD PRESSURE: 144 MMHG | DIASTOLIC BLOOD PRESSURE: 81 MMHG | HEART RATE: 98 BPM | WEIGHT: 161 LBS | BODY MASS INDEX: 26.79 KG/M2 | OXYGEN SATURATION: 97 %

## 2024-10-28 DIAGNOSIS — R06.00 DYSPNEA, UNSPECIFIED TYPE: Primary | ICD-10-CM

## 2024-10-28 DIAGNOSIS — F17.200 CURRENT SMOKER: ICD-10-CM

## 2024-10-28 PROCEDURE — G8417 CALC BMI ABV UP PARAM F/U: HCPCS | Performed by: INTERNAL MEDICINE

## 2024-10-28 PROCEDURE — 3017F COLORECTAL CA SCREEN DOC REV: CPT | Performed by: INTERNAL MEDICINE

## 2024-10-28 PROCEDURE — 99214 OFFICE O/P EST MOD 30 MIN: CPT | Performed by: INTERNAL MEDICINE

## 2024-10-28 PROCEDURE — G8484 FLU IMMUNIZE NO ADMIN: HCPCS | Performed by: INTERNAL MEDICINE

## 2024-10-28 PROCEDURE — G8427 DOCREV CUR MEDS BY ELIG CLIN: HCPCS | Performed by: INTERNAL MEDICINE

## 2024-10-28 PROCEDURE — 4004F PT TOBACCO SCREEN RCVD TLK: CPT | Performed by: INTERNAL MEDICINE

## 2024-10-28 RX ORDER — FLUTICASONE FUROATE AND VILANTEROL 100; 25 UG/1; UG/1
1 POWDER RESPIRATORY (INHALATION) DAILY
Qty: 1 EACH | Refills: 5 | Status: SHIPPED | OUTPATIENT
Start: 2024-10-28

## 2024-10-28 ASSESSMENT — ENCOUNTER SYMPTOMS
VOMITING: 0
SORE THROAT: 0
EYE DISCHARGE: 0
VOICE CHANGE: 0
PHOTOPHOBIA: 0
STRIDOR: 0
SHORTNESS OF BREATH: 1
CHEST TIGHTNESS: 0
EYE PAIN: 0
EYE REDNESS: 0
APNEA: 0
BLOOD IN STOOL: 0
TROUBLE SWALLOWING: 0
RHINORRHEA: 0
CONSTIPATION: 0
ABDOMINAL PAIN: 0
COUGH: 0
BACK PAIN: 1
DIARRHEA: 0
WHEEZING: 0
COLOR CHANGE: 0
NAUSEA: 0
EYE ITCHING: 0
CHOKING: 0

## 2024-10-28 NOTE — PROGRESS NOTES
Diana Holguin presents today for   Chief Complaint   Patient presents with    Follow-up     Dyspnea on Exertion    Results     CPET done 10/17/24       Is someone accompanying this pt? No    Is the patient using any DME equipment during OV? No    -DME Company NA    Depression Screenin/20/2024    10:42 AM   PHQ-9 Questionaire   Little interest or pleasure in doing things 3   Feeling down, depressed, or hopeless 1   Trouble falling or staying asleep, or sleeping too much 3   Feeling tired or having little energy 3   Poor appetite or overeating 1   Feeling bad about yourself - or that you are a failure or have let yourself or your family down 1   Trouble concentrating on things, such as reading the newspaper or watching television 2   Moving or speaking so slowly that other people could have noticed. Or the opposite - being so fidgety or restless that you have been moving around a lot more than usual 3   Thoughts that you would be better off dead, or of hurting yourself in some way 0   PHQ-9 Total Score 17   If you checked off any problems, how difficult have these problems made it for you to do your work, take care of things at home, or get along with other people? 2       Learning Needs Questionnaire:     Who is the primary learner? Patient    What is the preferred language for health care of the primary learner? ENGLISH    How does the primary learner prefer to learn new concepts? DEMONSTRATION    Answered By Patient    Relationship to Learner SELF          Fall Risk:         2024    12:34 PM   Fall Risk   Do you feel unsteady or are you worried about falling?  no   2 or more falls in past year? no   Fall with injury in past year? no        Abuse Screenin/20/2024    10:00 AM   AMB Abuse Screening   Do you ever feel afraid of your partner? N   Are you in a relationship with someone who physically or mentally threatens you? N   Is it safe for you to go home? Y         Coordination of 
years. She quit in 2013 and took to e cigarettes. 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         Review of Systems   Constitutional:  Positive for fatigue. Negative for activity change, appetite change, chills, diaphoresis, fever and unexpected weight change.   HENT:  Negative for congestion, hearing loss, nosebleeds, rhinorrhea, sore throat, tinnitus, trouble swallowing and voice change.    Eyes:  Negative for photophobia, pain, discharge, redness, itching and visual disturbance.   Respiratory:  Positive for shortness of breath. Negative for apnea, cough, choking, chest tightness, wheezing and stridor.    Cardiovascular:  Positive for chest pain. Negative for palpitations and leg swelling.   Gastrointestinal:  Negative for abdominal pain, blood in stool, constipation, diarrhea, nausea and vomiting.   Endocrine: Negative for cold intolerance, heat intolerance, polydipsia, polyphagia and polyuria.   Genitourinary:  Negative for dysuria, flank pain, hematuria and urgency.   Musculoskeletal:  Positive for arthralgias, back pain and joint swelling (knees). Negative for gait problem, myalgias, neck pain and neck stiffness.   Skin:  Negative for color change, pallor, rash and wound.   Allergic/Immunologic: Negative for environmental allergies, food allergies and immunocompromised state.   Neurological:  Positive for syncope and light-headedness. Negative for seizures, speech difficulty, weakness and headaches.   Hematological:  Negative for adenopathy. Does not bruise/bleed easily.   Psychiatric/Behavioral:  Positive for dysphoric mood. Negative for confusion, decreased concentration, hallucinations, self-injury, sleep disturbance and suicidal ideas. The patient is nervous/anxious. The patient is not hyperactive.         Past Medical History:   Diagnosis Date    Anxiety     Arthritis     Bipolar 1 disorder (HCC)     Cigarette smoker     Depression     Drug abuse (HCC)     Dyspnea on exertion 08/2011

## 2024-11-06 ENCOUNTER — OFFICE VISIT (OUTPATIENT)
Age: 54
End: 2024-11-06

## 2024-11-06 VITALS
BODY MASS INDEX: 25.99 KG/M2 | DIASTOLIC BLOOD PRESSURE: 73 MMHG | HEIGHT: 65 IN | HEART RATE: 90 BPM | WEIGHT: 156 LBS | SYSTOLIC BLOOD PRESSURE: 109 MMHG | OXYGEN SATURATION: 99 %

## 2024-11-06 DIAGNOSIS — R00.2 PALPITATIONS: ICD-10-CM

## 2024-11-06 DIAGNOSIS — Z72.0 TOBACCO ABUSE: ICD-10-CM

## 2024-11-06 DIAGNOSIS — Z87.898 HISTORY OF CHEST PAIN: Primary | ICD-10-CM

## 2024-11-06 DIAGNOSIS — R06.09 DYSPNEA ON EXERTION: ICD-10-CM

## 2024-11-06 NOTE — PROGRESS NOTES
Have you had Fatigue?  No     2.   Have you had have you had Chest Pain? No     3.   Have you had Dyspnea (SOB) ? Yes   can you walk 2 blocks or a flight of stairs without SOB no    4.   Have you had Orthopnea? No     5.   Have you had PND? No   6.   Have you had leg swelling? No   7.    Have you had any weight gain? No     8. Have you had any palpitations? No      9. Have you had any syncope? No   10. Do you have any wounds on legs?no  
recovery was normal. The patient reported chest pain during the stress test.    SSS = 3, SRS = 2, SDS = 1  ----  Vascular duplex lower extremity right and left 10/11/2023  No evidence of DVT RLE, No evidence of DVT LLE  ----    Have you had Fatigue?  No      2.   Have you had have you had Chest Pain? No      3.   Have you had Dyspnea (SOB) ? Yes   can you walk 2 blocks or a flight of stairs without SOB no, CCS III     4.   Have you had Orthopnea? No      5.   Have you had PND? No   6.   Have you had leg swelling? No   7.    Have you had any weight gain? No      8. Have you had any palpitations? No      9. Have you had any syncope? No   10. Do you have any wounds on legs?no       Reviewed with the patient and is as such.      Family History   Problem Relation Age of Onset    Hypertension Mother     Heart Attack Mother 60    Cancer Mother     Coronary Art Dis Mother     Heart Attack Father     Heart Surgery Maternal Grandfather     Hypertension Other     Diabetes Other     Psychiatric Disorder Other     Cancer Other     Heart Disease Other     Stroke Other        Past Medical History:   Diagnosis Date    Anxiety     Bipolar 1 disorder (HCC)     Cigarette smoker     Dyspnea on exertion 2011    Negative cardiac and pulmonary workup    Heart murmur     Heart murmur     History of gestational diabetes     Musculoskeletal disorder     DDD    Neurological disorder     migraines    Ovarian cyst     PTSD (post-traumatic stress disorder)        Past Surgical History:   Procedure Laterality Date     SECTION      COLONOSCOPY N/A 2021    COLONOSCOPY performed by Evelio Everett MD at [facility] ENDOSCOPY    TUBAL LIGATION         Social History     Tobacco Use    Smoking status: Former     Current packs/day: 0.00     Types: Cigarettes     Quit date: 2013     Years since quitting: 10.9    Smokeless tobacco: Current    Tobacco comments:     Quit smoking: vaping   Substance Use Topics    Alcohol use: No       No

## 2024-11-06 NOTE — PATIENT INSTRUCTIONS
Learning About the Mediterranean Diet  What is the Mediterranean diet?     The Mediterranean diet is a style of eating rather than a diet plan. It features foods eaten in [place], [place], [place], and other countries along the [place] Sea. It emphasizes eating foods like fish, fruits, vegetables, beans, high-fiber breads and whole grains, nuts, and olive oil. This style of eating includes limited red meat, cheese, and sweets.  Why choose the Mediterranean diet?  A Mediterranean-style diet may improve heart health. It contains more fat than other heart-healthy diets. But the fats are mainly from nuts, unsaturated oils (such as fish oils and olive oil), and certain nut or seed oils (such as canola, soybean, or flaxseed oil). These fats may help protect the heart and blood vessels.  How can you get started on the Mediterranean diet?  Here are some things you can do to switch to a more Mediterranean way of eating.  What to eat  Eat a variety of fruits and vegetables each day, such as grapes, blueberries, tomatoes, broccoli, peppers, figs, olives, spinach, eggplant, beans, lentils, and chickpeas.  Eat a variety of whole-grain foods each day, such as oats, brown rice, and whole wheat bread, pasta, and couscous.  Eat fish at least 2 times a week. Try tuna, salmon, mackerel, lake trout, herring, or sardines.  Eat moderate amounts of low-fat dairy products, such as milk, cheese, or yogurt.  Eat moderate amounts of poultry and eggs.  Choose healthy (unsaturated) fats, such as nuts, olive oil, and certain nut or seed oils like canola, soybean, and flaxseed.  Limit unhealthy (saturated) fats, such as butter, palm oil, and coconut oil. And limit fats found in animal products, such as meat and dairy products made with whole milk. Try to eat red meat only a few times a month in very small amounts.  Limit sweets and desserts to only a few times a week. This includes sugar-sweetened drinks like soda.  The

## 2024-11-19 SDOH — HEALTH STABILITY: PHYSICAL HEALTH: ON AVERAGE, HOW MANY DAYS PER WEEK DO YOU ENGAGE IN MODERATE TO STRENUOUS EXERCISE (LIKE A BRISK WALK)?: 0 DAYS

## 2024-11-19 ASSESSMENT — LIFESTYLE VARIABLES
HOW MANY STANDARD DRINKS CONTAINING ALCOHOL DO YOU HAVE ON A TYPICAL DAY: PATIENT DOES NOT DRINK
HOW OFTEN DO YOU HAVE SIX OR MORE DRINKS ON ONE OCCASION: 1
HOW OFTEN DO YOU HAVE A DRINK CONTAINING ALCOHOL: NEVER
HOW MANY STANDARD DRINKS CONTAINING ALCOHOL DO YOU HAVE ON A TYPICAL DAY: 0
HOW OFTEN DO YOU HAVE A DRINK CONTAINING ALCOHOL: 1

## 2024-11-19 ASSESSMENT — PATIENT HEALTH QUESTIONNAIRE - PHQ9
SUM OF ALL RESPONSES TO PHQ QUESTIONS 1-9: 23
3. TROUBLE FALLING OR STAYING ASLEEP: NEARLY EVERY DAY
5. POOR APPETITE OR OVEREATING: NEARLY EVERY DAY
1. LITTLE INTEREST OR PLEASURE IN DOING THINGS: NEARLY EVERY DAY
10. IF YOU CHECKED OFF ANY PROBLEMS, HOW DIFFICULT HAVE THESE PROBLEMS MADE IT FOR YOU TO DO YOUR WORK, TAKE CARE OF THINGS AT HOME, OR GET ALONG WITH OTHER PEOPLE: EXTREMELY DIFFICULT
4. FEELING TIRED OR HAVING LITTLE ENERGY: NEARLY EVERY DAY
9. THOUGHTS THAT YOU WOULD BE BETTER OFF DEAD, OR OF HURTING YOURSELF: MORE THAN HALF THE DAYS
SUM OF ALL RESPONSES TO PHQ9 QUESTIONS 1 & 2: 5
SUM OF ALL RESPONSES TO PHQ QUESTIONS 1-9: 21
8. MOVING OR SPEAKING SO SLOWLY THAT OTHER PEOPLE COULD HAVE NOTICED. OR THE OPPOSITE, BEING SO FIGETY OR RESTLESS THAT YOU HAVE BEEN MOVING AROUND A LOT MORE THAN USUAL: MORE THAN HALF THE DAYS
7. TROUBLE CONCENTRATING ON THINGS, SUCH AS READING THE NEWSPAPER OR WATCHING TELEVISION: MORE THAN HALF THE DAYS
2. FEELING DOWN, DEPRESSED OR HOPELESS: MORE THAN HALF THE DAYS
SUM OF ALL RESPONSES TO PHQ QUESTIONS 1-9: 23
SUM OF ALL RESPONSES TO PHQ QUESTIONS 1-9: 23
6. FEELING BAD ABOUT YOURSELF - OR THAT YOU ARE A FAILURE OR HAVE LET YOURSELF OR YOUR FAMILY DOWN: NEARLY EVERY DAY

## 2024-11-22 ENCOUNTER — OFFICE VISIT (OUTPATIENT)
Facility: CLINIC | Age: 54
End: 2024-11-22
Payer: MEDICARE

## 2024-11-22 VITALS
WEIGHT: 160.2 LBS | TEMPERATURE: 97.5 F | BODY MASS INDEX: 26.69 KG/M2 | DIASTOLIC BLOOD PRESSURE: 70 MMHG | SYSTOLIC BLOOD PRESSURE: 112 MMHG | HEIGHT: 65 IN | HEART RATE: 76 BPM | RESPIRATION RATE: 16 BRPM | OXYGEN SATURATION: 98 %

## 2024-11-22 DIAGNOSIS — R45.851 SUICIDAL IDEATIONS: ICD-10-CM

## 2024-11-22 DIAGNOSIS — D50.9 IRON DEFICIENCY ANEMIA, UNSPECIFIED IRON DEFICIENCY ANEMIA TYPE: ICD-10-CM

## 2024-11-22 DIAGNOSIS — R73.03 PREDIABETES: ICD-10-CM

## 2024-11-22 DIAGNOSIS — Z00.00 INITIAL MEDICARE ANNUAL WELLNESS VISIT: Primary | ICD-10-CM

## 2024-11-22 DIAGNOSIS — Z13.31 POSITIVE DEPRESSION SCREENING: ICD-10-CM

## 2024-11-22 DIAGNOSIS — E78.2 MIXED HYPERLIPIDEMIA: ICD-10-CM

## 2024-11-22 PROCEDURE — G0438 PPPS, INITIAL VISIT: HCPCS | Performed by: FAMILY MEDICINE

## 2024-11-22 PROCEDURE — 3017F COLORECTAL CA SCREEN DOC REV: CPT | Performed by: FAMILY MEDICINE

## 2024-11-22 PROCEDURE — G8484 FLU IMMUNIZE NO ADMIN: HCPCS | Performed by: FAMILY MEDICINE

## 2024-11-22 RX ORDER — ATORVASTATIN CALCIUM 20 MG/1
20 TABLET, FILM COATED ORAL DAILY
Qty: 90 TABLET | Refills: 0 | Status: SHIPPED | OUTPATIENT
Start: 2024-11-22

## 2024-11-22 RX ORDER — FERROUS SULFATE 325(65) MG
325 TABLET, DELAYED RELEASE (ENTERIC COATED) ORAL 2 TIMES DAILY
Qty: 90 TABLET | Refills: 3 | Status: SHIPPED | OUTPATIENT
Start: 2024-11-22

## 2024-11-22 ASSESSMENT — COLUMBIA-SUICIDE SEVERITY RATING SCALE - C-SSRS
2. HAVE YOU ACTUALLY HAD ANY THOUGHTS OF KILLING YOURSELF?: YES
4. HAVE YOU HAD THESE THOUGHTS AND HAD SOME INTENTION OF ACTING ON THEM?: NO
3. HAVE YOU BEEN THINKING ABOUT HOW YOU MIGHT KILL YOURSELF?: NO
5. HAVE YOU STARTED TO WORK OUT OR WORKED OUT THE DETAILS OF HOW TO KILL YOURSELF? DO YOU INTEND TO CARRY OUT THIS PLAN?: NO
6. HAVE YOU EVER DONE ANYTHING, STARTED TO DO ANYTHING, OR PREPARED TO DO ANYTHING TO END YOUR LIFE?: NO
1. WITHIN THE PAST MONTH, HAVE YOU WISHED YOU WERE DEAD OR WISHED YOU COULD GO TO SLEEP AND NOT WAKE UP?: YES

## 2024-11-22 ASSESSMENT — PATIENT HEALTH QUESTIONNAIRE - PHQ9
SUM OF ALL RESPONSES TO PHQ9 QUESTIONS 1 & 2: 6
SUM OF ALL RESPONSES TO PHQ QUESTIONS 1-9: 25
3. TROUBLE FALLING OR STAYING ASLEEP: NEARLY EVERY DAY
1. LITTLE INTEREST OR PLEASURE IN DOING THINGS: NEARLY EVERY DAY
6. FEELING BAD ABOUT YOURSELF - OR THAT YOU ARE A FAILURE OR HAVE LET YOURSELF OR YOUR FAMILY DOWN: MORE THAN HALF THE DAYS
2. FEELING DOWN, DEPRESSED OR HOPELESS: NEARLY EVERY DAY
SUM OF ALL RESPONSES TO PHQ QUESTIONS 1-9: 25
8. MOVING OR SPEAKING SO SLOWLY THAT OTHER PEOPLE COULD HAVE NOTICED. OR THE OPPOSITE, BEING SO FIGETY OR RESTLESS THAT YOU HAVE BEEN MOVING AROUND A LOT MORE THAN USUAL: NEARLY EVERY DAY
9. THOUGHTS THAT YOU WOULD BE BETTER OFF DEAD, OR OF HURTING YOURSELF: NEARLY EVERY DAY
SUM OF ALL RESPONSES TO PHQ QUESTIONS 1-9: 22
SUM OF ALL RESPONSES TO PHQ QUESTIONS 1-9: 25
4. FEELING TIRED OR HAVING LITTLE ENERGY: NEARLY EVERY DAY
7. TROUBLE CONCENTRATING ON THINGS, SUCH AS READING THE NEWSPAPER OR WATCHING TELEVISION: MORE THAN HALF THE DAYS
5. POOR APPETITE OR OVEREATING: NEARLY EVERY DAY
10. IF YOU CHECKED OFF ANY PROBLEMS, HOW DIFFICULT HAVE THESE PROBLEMS MADE IT FOR YOU TO DO YOUR WORK, TAKE CARE OF THINGS AT HOME, OR GET ALONG WITH OTHER PEOPLE: EXTREMELY DIFFICULT

## 2024-11-22 NOTE — PATIENT INSTRUCTIONS
can help reduce stress. Walking is a great way to get started. Even everyday activities such as housecleaning or yard work can help.  Try yoga or ollie chi. These techniques combine exercise and meditation. You may need some training at first to learn them.  Do something you enjoy. For example, listen to music or go to a movie. Practice your hobby or do volunteer work.  Meditate. This can help you relax, because you are not worrying about what happened before or what may happen in the future.  Do guided imagery. Imagine yourself in any setting that helps you feel calm. You can use online videos, books, or a teacher to guide you.  Do breathing exercises. For example:  From a standing position, bend forward from the waist with your knees slightly bent. Let your arms dangle close to the floor.  Breathe in slowly and deeply as you return to a standing position. Roll up slowly and lift your head last.  Hold your breath for just a few seconds in the standing position.  Breathe out slowly and bend forward from the waist.  Let your feelings out. Talk, laugh, cry, and express anger when you need to. Talking with supportive friends or family, a counselor, or a gray leader about your feelings is a healthy way to relieve stress. Avoid discussing your feelings with people who make you feel worse.  Write. It may help to write about things that are bothering you. This helps you find out how much stress you feel and what is causing it. When you know this, you can find better ways to cope.  What can you do to prevent stress?  You might try some of these things to help prevent stress:  Manage your time. This helps you find time to do the things you want and need to do.  Get enough sleep. Your body recovers from the stresses of the day while you are sleeping.  Get support. Your family, friends, and community can make a difference in how you experience stress.  Limit your news feed. Avoid or limit time on social media or news that may

## 2024-11-22 NOTE — PROGRESS NOTES
Medicare Annual Wellness Visit    Diana Holguin is here for Medicare AWV    Assessment & Plan   Initial Medicare annual wellness visit  Suicidal ideations  Positive depression screening  Prediabetes  -     Comprehensive Metabolic Panel; Future  -     Hemoglobin A1C; Future  Mixed hyperlipidemia  -     atorvastatin (LIPITOR) 20 MG tablet; Take 1 tablet by mouth daily, Disp-90 tablet, R-0Normal  -     Lipid Panel; Future  Iron deficiency anemia, unspecified iron deficiency anemia type  -     ferrous sulfate (FE TABS) 325 (65 Fe) MG EC tablet; Take 1 tablet by mouth 2 times daily, Disp-90 tablet, R-3Normal  -     CBC with Auto Differential; Future  -     Vitamin B12; Future  -     Folate; Future  -     Ferritin; Future  -     Iron and TIBC; Future  -     Transferrin; Future    PHQ-9 score today: (PHQ-9 Total Score: 25), additional evaluation and assessment performed, follow-up plan includes but not limited to: Medication management and Referral to /Specialist  for evaluation and management.     Recommendations for Preventive Services Due: see orders and patient instructions/AVS.  Recommended screening schedule for the next 5-10 years is provided to the patient in written form: see Patient Instructions/AVS.     Return in 1 year (on 11/22/2025) for Medicare AWV.     Subjective   MDD and SI: EDMR and therapist and psychiatrist on board through [facility]. She is working on anger and then will be working on trauma. She is compliant with her med regimen for mental health.     PHQ-9 Total Score: 25 (11/22/2024  9:12 AM)  Thoughts that you would be better off dead, or of hurting yourself in some way: 3 (11/22/2024  9:12 AM)    Patient's complete Health Risk Assessment and screening values have been reviewed and are found in Flowsheets. The following problems were reviewed today and where indicated follow up appointments were made and/or referrals ordered.    Percy, her \"furbaby,\" is a martin-poo.he is 2 years old

## 2024-11-22 NOTE — PROGRESS NOTES
\"Have you been to the ER, urgent care clinic since your last visit?  Hospitalized since your last visit?\"    NO    “Have you seen or consulted any other health care providers outside our system since your last visit?”    YES - When: approximately 1 months ago.  Where and Why: [facility] 11/2024, [facility] 11/20/2024 .             Patient reports inner left thigh tender to touch.    Patient reports taking Iron supplement but not sure if she still needs to take.

## 2024-11-27 ENCOUNTER — APPOINTMENT (OUTPATIENT)
Facility: HOSPITAL | Age: 54
End: 2024-11-27
Attending: STUDENT IN AN ORGANIZED HEALTH CARE EDUCATION/TRAINING PROGRAM
Payer: MEDICARE

## 2024-11-27 ENCOUNTER — HOSPITAL ENCOUNTER (EMERGENCY)
Facility: HOSPITAL | Age: 54
Discharge: HOME | End: 2024-11-27
Attending: STUDENT IN AN ORGANIZED HEALTH CARE EDUCATION/TRAINING PROGRAM
Payer: MEDICARE

## 2024-11-27 VITALS
SYSTOLIC BLOOD PRESSURE: 113 MMHG | HEIGHT: 63 IN | DIASTOLIC BLOOD PRESSURE: 60 MMHG | RESPIRATION RATE: 25 BRPM | TEMPERATURE: 97.9 F | WEIGHT: 160 LBS | BODY MASS INDEX: 28.35 KG/M2 | OXYGEN SATURATION: 100 % | HEART RATE: 70 BPM

## 2024-11-27 DIAGNOSIS — R07.9 CHEST PAIN, UNSPECIFIED TYPE: Primary | ICD-10-CM

## 2024-11-27 LAB
ALBUMIN SERPL-MCNC: 3.8 G/DL (ref 3.4–5)
ALBUMIN/GLOB SERPL: 1 (ref 0.8–1.7)
ALP SERPL-CCNC: 135 U/L (ref 45–117)
ALT SERPL-CCNC: 18 U/L (ref 13–56)
ANION GAP SERPL CALC-SCNC: 8 MMOL/L (ref 3–18)
AST SERPL-CCNC: 20 U/L (ref 10–38)
BASOPHILS # BLD: 0.1 K/UL (ref 0–0.1)
BASOPHILS NFR BLD: 1 % (ref 0–2)
BILIRUB DIRECT SERPL-MCNC: 0.1 MG/DL (ref 0–0.2)
BILIRUB SERPL-MCNC: 0.4 MG/DL (ref 0.2–1)
BUN SERPL-MCNC: 14 MG/DL (ref 7–18)
BUN/CREAT SERPL: 15 (ref 12–20)
CALCIUM SERPL-MCNC: 9.5 MG/DL (ref 8.5–10.1)
CHLORIDE SERPL-SCNC: 108 MMOL/L (ref 100–111)
CO2 SERPL-SCNC: 24 MMOL/L (ref 21–32)
CREAT SERPL-MCNC: 0.94 MG/DL (ref 0.6–1.3)
DIFFERENTIAL METHOD BLD: ABNORMAL
EOSINOPHIL # BLD: 0 K/UL (ref 0–0.4)
EOSINOPHIL NFR BLD: 0 % (ref 0–5)
ERYTHROCYTE [DISTWIDTH] IN BLOOD BY AUTOMATED COUNT: 16.7 % (ref 11.6–14.5)
FLUAV RNA SPEC QL NAA+PROBE: NOT DETECTED
FLUBV RNA SPEC QL NAA+PROBE: NOT DETECTED
GLOBULIN SER CALC-MCNC: 4 G/DL (ref 2–4)
GLUCOSE SERPL-MCNC: 115 MG/DL (ref 74–99)
HCT VFR BLD AUTO: 36.6 % (ref 35–45)
HGB BLD-MCNC: 11.7 G/DL (ref 12–16)
IMM GRANULOCYTES # BLD AUTO: 0 K/UL (ref 0–0.04)
IMM GRANULOCYTES NFR BLD AUTO: 0 % (ref 0–0.5)
LIPASE SERPL-CCNC: 35 U/L (ref 13–75)
LYMPHOCYTES # BLD: 3 K/UL (ref 0.9–3.6)
LYMPHOCYTES NFR BLD: 33 % (ref 21–52)
MAGNESIUM SERPL-MCNC: 2.2 MG/DL (ref 1.6–2.6)
MCH RBC QN AUTO: 24.4 PG (ref 24–34)
MCHC RBC AUTO-ENTMCNC: 32 G/DL (ref 31–37)
MCV RBC AUTO: 76.3 FL (ref 78–100)
MONOCYTES # BLD: 0.6 K/UL (ref 0.05–1.2)
MONOCYTES NFR BLD: 7 % (ref 3–10)
NEUTS SEG # BLD: 5.3 K/UL (ref 1.8–8)
NEUTS SEG NFR BLD: 59 % (ref 40–73)
NRBC # BLD: 0 K/UL (ref 0–0.01)
NRBC BLD-RTO: 0 PER 100 WBC
NT PRO BNP: 27 PG/ML (ref 0–900)
PLATELET # BLD AUTO: 347 K/UL (ref 135–420)
PMV BLD AUTO: 10.2 FL (ref 9.2–11.8)
POTASSIUM SERPL-SCNC: 3.9 MMOL/L (ref 3.5–5.5)
PROT SERPL-MCNC: 7.8 G/DL (ref 6.4–8.2)
RBC # BLD AUTO: 4.8 M/UL (ref 4.2–5.3)
SARS-COV-2 RNA RESP QL NAA+PROBE: NOT DETECTED
SODIUM SERPL-SCNC: 140 MMOL/L (ref 136–145)
SOURCE: NORMAL
TROPONIN I SERPL HS-MCNC: 14 NG/L (ref 0–54)
WBC # BLD AUTO: 9 K/UL (ref 4.6–13.2)

## 2024-11-27 PROCEDURE — 2580000003 HC RX 258: Performed by: STUDENT IN AN ORGANIZED HEALTH CARE EDUCATION/TRAINING PROGRAM

## 2024-11-27 PROCEDURE — 2500000003 HC RX 250 WO HCPCS: Performed by: STUDENT IN AN ORGANIZED HEALTH CARE EDUCATION/TRAINING PROGRAM

## 2024-11-27 PROCEDURE — 84484 ASSAY OF TROPONIN QUANT: CPT

## 2024-11-27 PROCEDURE — 93005 ELECTROCARDIOGRAM TRACING: CPT | Performed by: STUDENT IN AN ORGANIZED HEALTH CARE EDUCATION/TRAINING PROGRAM

## 2024-11-27 PROCEDURE — 83880 ASSAY OF NATRIURETIC PEPTIDE: CPT

## 2024-11-27 PROCEDURE — 96375 TX/PRO/DX INJ NEW DRUG ADDON: CPT

## 2024-11-27 PROCEDURE — 83690 ASSAY OF LIPASE: CPT

## 2024-11-27 PROCEDURE — 99285 EMERGENCY DEPT VISIT HI MDM: CPT

## 2024-11-27 PROCEDURE — 96374 THER/PROPH/DIAG INJ IV PUSH: CPT

## 2024-11-27 PROCEDURE — 6370000000 HC RX 637 (ALT 250 FOR IP): Performed by: STUDENT IN AN ORGANIZED HEALTH CARE EDUCATION/TRAINING PROGRAM

## 2024-11-27 PROCEDURE — 85025 COMPLETE CBC W/AUTO DIFF WBC: CPT

## 2024-11-27 PROCEDURE — 80048 BASIC METABOLIC PNL TOTAL CA: CPT

## 2024-11-27 PROCEDURE — 83735 ASSAY OF MAGNESIUM: CPT

## 2024-11-27 PROCEDURE — 87636 SARSCOV2 & INF A&B AMP PRB: CPT

## 2024-11-27 PROCEDURE — 80076 HEPATIC FUNCTION PANEL: CPT

## 2024-11-27 PROCEDURE — 6360000002 HC RX W HCPCS: Performed by: STUDENT IN AN ORGANIZED HEALTH CARE EDUCATION/TRAINING PROGRAM

## 2024-11-27 PROCEDURE — 71046 X-RAY EXAM CHEST 2 VIEWS: CPT

## 2024-11-27 RX ORDER — ONDANSETRON 2 MG/ML
4 INJECTION INTRAMUSCULAR; INTRAVENOUS ONCE
Status: COMPLETED | OUTPATIENT
Start: 2024-11-27 | End: 2024-11-27

## 2024-11-27 RX ORDER — HYDROCODONE BITARTRATE AND ACETAMINOPHEN 5; 325 MG/1; MG/1
1 TABLET ORAL
Status: COMPLETED | OUTPATIENT
Start: 2024-11-27 | End: 2024-11-27

## 2024-11-27 RX ORDER — IPRATROPIUM BROMIDE AND ALBUTEROL SULFATE 2.5; .5 MG/3ML; MG/3ML
1 SOLUTION RESPIRATORY (INHALATION)
Status: COMPLETED | OUTPATIENT
Start: 2024-11-27 | End: 2024-11-27

## 2024-11-27 RX ORDER — METHOCARBAMOL 750 MG/1
750 TABLET, FILM COATED ORAL NIGHTLY PRN
Qty: 14 TABLET | Refills: 0 | Status: SHIPPED | OUTPATIENT
Start: 2024-11-27

## 2024-11-27 RX ORDER — ASPIRIN 325 MG
325 TABLET ORAL
Status: COMPLETED | OUTPATIENT
Start: 2024-11-27 | End: 2024-11-27

## 2024-11-27 RX ORDER — KETOROLAC TROMETHAMINE 15 MG/ML
15 INJECTION, SOLUTION INTRAMUSCULAR; INTRAVENOUS ONCE
Status: COMPLETED | OUTPATIENT
Start: 2024-11-27 | End: 2024-11-27

## 2024-11-27 RX ORDER — FAMOTIDINE 20 MG/1
20 TABLET, FILM COATED ORAL 2 TIMES DAILY
Qty: 24 TABLET | Refills: 1 | Status: SHIPPED | OUTPATIENT
Start: 2024-11-27

## 2024-11-27 RX ORDER — KETOROLAC TROMETHAMINE 10 MG/1
10 TABLET, FILM COATED ORAL EVERY 6 HOURS PRN
Qty: 15 TABLET | Refills: 0 | Status: SHIPPED | OUTPATIENT
Start: 2024-11-27

## 2024-11-27 RX ADMIN — ASPIRIN 325 MG: 325 TABLET ORAL at 21:10

## 2024-11-27 RX ADMIN — HYDROCODONE BITARTRATE AND ACETAMINOPHEN 1 TABLET: 5; 325 TABLET ORAL at 22:14

## 2024-11-27 RX ADMIN — KETOROLAC TROMETHAMINE 15 MG: 15 INJECTION, SOLUTION INTRAMUSCULAR; INTRAVENOUS at 21:12

## 2024-11-27 RX ADMIN — IPRATROPIUM BROMIDE AND ALBUTEROL SULFATE 1 DOSE: .5; 3 SOLUTION RESPIRATORY (INHALATION) at 21:15

## 2024-11-27 RX ADMIN — FAMOTIDINE 20 MG: 10 INJECTION, SOLUTION INTRAVENOUS at 22:12

## 2024-11-27 RX ADMIN — ONDANSETRON 4 MG: 2 INJECTION, SOLUTION INTRAMUSCULAR; INTRAVENOUS at 21:11

## 2024-11-27 ASSESSMENT — PAIN SCALES - GENERAL: PAINLEVEL_OUTOF10: 7

## 2024-11-27 ASSESSMENT — PAIN DESCRIPTION - LOCATION: LOCATION: CHEST

## 2024-11-27 ASSESSMENT — PAIN - FUNCTIONAL ASSESSMENT: PAIN_FUNCTIONAL_ASSESSMENT: 0-10

## 2024-11-28 LAB
EKG ATRIAL RATE: 80 BPM
EKG DIAGNOSIS: NORMAL
EKG P AXIS: 47 DEGREES
EKG P-R INTERVAL: 152 MS
EKG Q-T INTERVAL: 360 MS
EKG QRS DURATION: 80 MS
EKG QTC CALCULATION (BAZETT): 415 MS
EKG R AXIS: -6 DEGREES
EKG T AXIS: 38 DEGREES
EKG VENTRICULAR RATE: 80 BPM

## 2024-11-28 PROCEDURE — 93010 ELECTROCARDIOGRAM REPORT: CPT | Performed by: INTERNAL MEDICINE

## 2024-11-28 NOTE — ED PROVIDER NOTES
patient.    I reviewed the vital signs, available nursing notes, past medical history, past surgical history, family history and social history.    Vital Signs-Reviewed the patient's vital signs.    ED EKG interpretation:  Rhythm: normal sinus rhythm; and regular . Rate (approx.): 80; Axis: left axis deviation; P wave: normal; QRS interval: normal ; ST/T wave: normal; Other findings: normal. This EKG was interpreted by Masood Saenz D.O.         Records Reviewed: Personally, on initial evaluation    MDM:   DDX includes but is not limited to: ACS, atypical chest pain, musculoskeletal chest pain, viral syndrome, pneumonia    Patient overall in no acute distress and vital signs grossly within normal limits upon arrival to the emergency department.  Will obtain lab work and imaging for further evaluation of her symptoms.  Patient symptoms are inconsistent with ACS. On chart review patient has had a stress test on 4/12/2023 which was found to be normal at that time.  Will continue to monitor and evaluate patient while in the emergency department.      Orders as below:  Orders Placed This Encounter   Procedures    COVID-19 & Influenza Combo    XR CHEST (2 VW)    CBC with Auto Differential    BMP    Troponin    Magnesium    Brain Natriuretic Peptide    Lipase    Hepatic Function Panel    EKG 12 Lead (Chest Pain)          ED Course:   ED Course as of 11/27/24 2310   Wed Nov 27, 2024 2119 Patient's lab work significant for hemoglobin 11.7 otherwise labs grossly within normal limits at this time.  Do not feel patient would benefit from repeat troponin as symptoms have been ongoing for greater than 3 hours prior to arrival in the emergency department.  Will continue to monitor patient. [DV]   2146 Patient's lab work further grossly within normal limits.  Will continue to monitor patient. [DV]   2156 Negative COVID and flu.  Will reevaluate patient for further disposition. [DV]   2201 On reevaluation patient appears well and

## 2024-11-28 NOTE — ED NOTES
Discharge teaching provided to patient regarding treatment received,medications prescribed, and proper follow-up care. Patient verbalized understanding directions and follow up care. Patient left ambulatory with discharge paperwork in hand.  Family at bedside to provide ride home

## 2024-11-28 NOTE — ED TRIAGE NOTES
Pt came to ED c/o chest pain with shortness of breath started around 5pm, feeling nauseated on arrival to ED

## 2024-12-25 ENCOUNTER — APPOINTMENT (OUTPATIENT)
Facility: HOSPITAL | Age: 54
End: 2024-12-25
Payer: MEDICARE

## 2024-12-25 ENCOUNTER — HOSPITAL ENCOUNTER (EMERGENCY)
Facility: HOSPITAL | Age: 54
Discharge: HOME | End: 2024-12-25
Attending: STUDENT IN AN ORGANIZED HEALTH CARE EDUCATION/TRAINING PROGRAM
Payer: MEDICARE

## 2024-12-25 VITALS
BODY MASS INDEX: 28.35 KG/M2 | WEIGHT: 160 LBS | OXYGEN SATURATION: 98 % | RESPIRATION RATE: 12 BRPM | HEART RATE: 85 BPM | SYSTOLIC BLOOD PRESSURE: 126 MMHG | HEIGHT: 63 IN | TEMPERATURE: 97.8 F | DIASTOLIC BLOOD PRESSURE: 75 MMHG

## 2024-12-25 DIAGNOSIS — R07.9 CHEST PAIN, UNSPECIFIED TYPE: Primary | ICD-10-CM

## 2024-12-25 LAB
ANION GAP SERPL CALC-SCNC: 8 MMOL/L (ref 3–18)
BASOPHILS # BLD: 0 K/UL (ref 0–0.1)
BASOPHILS NFR BLD: 1 % (ref 0–2)
BUN SERPL-MCNC: 17 MG/DL (ref 7–18)
BUN/CREAT SERPL: 20 (ref 12–20)
CALCIUM SERPL-MCNC: 9.4 MG/DL (ref 8.5–10.1)
CHLORIDE SERPL-SCNC: 107 MMOL/L (ref 100–111)
CO2 SERPL-SCNC: 26 MMOL/L (ref 21–32)
CREAT SERPL-MCNC: 0.84 MG/DL (ref 0.6–1.3)
DIFFERENTIAL METHOD BLD: ABNORMAL
EKG ATRIAL RATE: 89 BPM
EKG DIAGNOSIS: NORMAL
EKG P AXIS: 50 DEGREES
EKG P-R INTERVAL: 162 MS
EKG Q-T INTERVAL: 374 MS
EKG QRS DURATION: 80 MS
EKG QTC CALCULATION (BAZETT): 455 MS
EKG R AXIS: -3 DEGREES
EKG T AXIS: 38 DEGREES
EKG VENTRICULAR RATE: 89 BPM
EOSINOPHIL # BLD: 0.1 K/UL (ref 0–0.4)
EOSINOPHIL NFR BLD: 1 % (ref 0–5)
ERYTHROCYTE [DISTWIDTH] IN BLOOD BY AUTOMATED COUNT: 17.1 % (ref 11.6–14.5)
GLUCOSE SERPL-MCNC: 94 MG/DL (ref 74–99)
HCT VFR BLD AUTO: 33.3 % (ref 35–45)
HGB BLD-MCNC: 10.6 G/DL (ref 12–16)
IMM GRANULOCYTES # BLD AUTO: 0 K/UL (ref 0–0.04)
IMM GRANULOCYTES NFR BLD AUTO: 0 % (ref 0–0.5)
LYMPHOCYTES # BLD: 2.8 K/UL (ref 0.9–3.6)
LYMPHOCYTES NFR BLD: 36 % (ref 21–52)
MCH RBC QN AUTO: 24.8 PG (ref 24–34)
MCHC RBC AUTO-ENTMCNC: 31.8 G/DL (ref 31–37)
MCV RBC AUTO: 77.8 FL (ref 78–100)
MONOCYTES # BLD: 0.6 K/UL (ref 0.05–1.2)
MONOCYTES NFR BLD: 8 % (ref 3–10)
NEUTS SEG # BLD: 4.3 K/UL (ref 1.8–8)
NEUTS SEG NFR BLD: 55 % (ref 40–73)
NRBC # BLD: 0 K/UL (ref 0–0.01)
NRBC BLD-RTO: 0 PER 100 WBC
PLATELET # BLD AUTO: 302 K/UL (ref 135–420)
PMV BLD AUTO: 10.5 FL (ref 9.2–11.8)
POTASSIUM SERPL-SCNC: 3.8 MMOL/L (ref 3.5–5.5)
RBC # BLD AUTO: 4.28 M/UL (ref 4.2–5.3)
SODIUM SERPL-SCNC: 141 MMOL/L (ref 136–145)
TROPONIN I SERPL HS-MCNC: 4 NG/L (ref 0–54)
WBC # BLD AUTO: 7.8 K/UL (ref 4.6–13.2)

## 2024-12-25 PROCEDURE — 93005 ELECTROCARDIOGRAM TRACING: CPT | Performed by: STUDENT IN AN ORGANIZED HEALTH CARE EDUCATION/TRAINING PROGRAM

## 2024-12-25 PROCEDURE — 71045 X-RAY EXAM CHEST 1 VIEW: CPT

## 2024-12-25 PROCEDURE — 85025 COMPLETE CBC W/AUTO DIFF WBC: CPT

## 2024-12-25 PROCEDURE — 84484 ASSAY OF TROPONIN QUANT: CPT

## 2024-12-25 PROCEDURE — 93010 ELECTROCARDIOGRAM REPORT: CPT | Performed by: INTERNAL MEDICINE

## 2024-12-25 PROCEDURE — 80048 BASIC METABOLIC PNL TOTAL CA: CPT

## 2024-12-25 PROCEDURE — 99285 EMERGENCY DEPT VISIT HI MDM: CPT

## 2024-12-25 PROCEDURE — 6360000002 HC RX W HCPCS: Performed by: STUDENT IN AN ORGANIZED HEALTH CARE EDUCATION/TRAINING PROGRAM

## 2024-12-25 PROCEDURE — 96374 THER/PROPH/DIAG INJ IV PUSH: CPT

## 2024-12-25 RX ORDER — KETOROLAC TROMETHAMINE 15 MG/ML
15 INJECTION, SOLUTION INTRAMUSCULAR; INTRAVENOUS
Status: COMPLETED | OUTPATIENT
Start: 2024-12-25 | End: 2024-12-25

## 2024-12-25 RX ADMIN — KETOROLAC TROMETHAMINE 15 MG: 15 INJECTION INTRAMUSCULAR at 01:17

## 2024-12-25 ASSESSMENT — PAIN - FUNCTIONAL ASSESSMENT: PAIN_FUNCTIONAL_ASSESSMENT: ACTIVITIES ARE NOT PREVENTED

## 2024-12-25 ASSESSMENT — ENCOUNTER SYMPTOMS
VOMITING: 0
NAUSEA: 0
SHORTNESS OF BREATH: 0
DIARRHEA: 0
ABDOMINAL PAIN: 0
CHEST TIGHTNESS: 0

## 2024-12-25 ASSESSMENT — PAIN SCALES - GENERAL
PAINLEVEL_OUTOF10: 6
PAINLEVEL_OUTOF10: 6

## 2024-12-25 ASSESSMENT — PAIN DESCRIPTION - LOCATION: LOCATION: CHEST

## 2024-12-25 ASSESSMENT — PAIN DESCRIPTION - ORIENTATION
ORIENTATION: MID
ORIENTATION: MID

## 2024-12-25 ASSESSMENT — PAIN DESCRIPTION - DESCRIPTORS
DESCRIPTORS: PRESSURE
DESCRIPTORS: PRESSURE

## 2024-12-25 NOTE — DISCHARGE INSTRUCTIONS
Please make a follow up appointment with your primary care doctor. Return to the emergency department for any new or worsening symptoms.

## 2024-12-25 NOTE — ED PROVIDER NOTES
saturating 100% in room air.  Patient has been updated on findings and reassured.  Advised on continued symptomatic management at home and follow-up with her PCP.  ED return precautions advised.  Patient verbalizes understanding agreement plan.  Stable for discharge.                Procedures          Diagnosis     Clinical Impression:   1. Chest pain, unspecified type        Disposition: discharged    Disclaimer: Sections of this note are dictated using utilizing voice recognition software.  Minor typographical errors may be present. If questions arise, please do not hesitate to contact me or call our department.         Kwame Haynes, DO  12/25/24 0148

## 2024-12-25 NOTE — ED TRIAGE NOTES
Patient presents with chest pressure x \"a couple of hours\".   States feels similar to CP that she was seen for a month ago.

## 2025-01-21 PROBLEM — M47.812 CERVICAL SPONDYLOSIS: Status: ACTIVE | Noted: 2024-09-09

## 2025-01-21 PROBLEM — J45.909 ASTHMA: Status: ACTIVE | Noted: 2024-10-28

## 2025-01-21 PROBLEM — M46.1 INFLAMMATION OF SACROILIAC JOINT (HCC): Status: ACTIVE | Noted: 2024-10-30

## 2025-01-21 PROBLEM — R79.0 LOW FERRITIN: Status: ACTIVE | Noted: 2025-01-21

## 2025-01-23 DIAGNOSIS — E78.2 MIXED HYPERLIPIDEMIA: ICD-10-CM

## 2025-01-27 ENCOUNTER — CLINICAL DOCUMENTATION (OUTPATIENT)
Age: 55
End: 2025-01-27

## 2025-01-27 NOTE — TELEPHONE ENCOUNTER
Last OV: 11/22/2024  Last labs:06/13/2024  Next OV and labs:  due on or around 05/2025 along with fasting labs. Left message for Diana to call [facility] at [phone] option #1 and appointment ticket sent via my my-chart

## 2025-01-28 RX ORDER — ATORVASTATIN CALCIUM 20 MG/1
20 TABLET, FILM COATED ORAL DAILY
Qty: 90 TABLET | Refills: 0 | Status: SHIPPED | OUTPATIENT
Start: 2025-01-28

## 2025-02-27 ENCOUNTER — TELEPHONE (OUTPATIENT)
Age: 55
End: 2025-02-27

## 2025-02-27 DIAGNOSIS — M17.11 UNILATERAL PRIMARY OSTEOARTHRITIS, RIGHT KNEE: ICD-10-CM

## 2025-02-27 DIAGNOSIS — M17.12 UNILATERAL PRIMARY OSTEOARTHRITIS, LEFT KNEE: Primary | ICD-10-CM

## 2025-02-27 NOTE — TELEPHONE ENCOUNTER
Patient called and is requesting to have an order place for gel injections of both knees.        Please call and advise patient when order is placed  [phone]

## 2025-03-09 PROBLEM — D64.9 ABSOLUTE ANEMIA: Status: ACTIVE | Noted: 2025-03-09

## 2025-03-10 ENCOUNTER — OFFICE VISIT (OUTPATIENT)
Age: 55
End: 2025-03-10
Payer: MEDICARE

## 2025-03-10 VITALS
RESPIRATION RATE: 18 BRPM | OXYGEN SATURATION: 99 % | TEMPERATURE: 97 F | WEIGHT: 158 LBS | BODY MASS INDEX: 29.08 KG/M2 | DIASTOLIC BLOOD PRESSURE: 67 MMHG | HEART RATE: 84 BPM | HEIGHT: 62 IN | SYSTOLIC BLOOD PRESSURE: 121 MMHG

## 2025-03-10 DIAGNOSIS — F31.9 BIPOLAR 1 DISORDER (HCC): ICD-10-CM

## 2025-03-10 DIAGNOSIS — D50.8 OTHER IRON DEFICIENCY ANEMIA: ICD-10-CM

## 2025-03-10 DIAGNOSIS — G47.10 HYPERSOMNIA, UNSPECIFIED: ICD-10-CM

## 2025-03-10 DIAGNOSIS — R06.83 LOUD SNORING: ICD-10-CM

## 2025-03-10 DIAGNOSIS — Z72.821 INADEQUATE SLEEP HYGIENE: ICD-10-CM

## 2025-03-10 DIAGNOSIS — R06.81 WITNESSED APNEIC SPELLS: ICD-10-CM

## 2025-03-10 DIAGNOSIS — G47.19 EXCESSIVE DAYTIME SLEEPINESS: ICD-10-CM

## 2025-03-10 DIAGNOSIS — R29.818 SUSPECTED SLEEP APNEA: Primary | ICD-10-CM

## 2025-03-10 DIAGNOSIS — G47.30 INSOMNIA W/ SLEEP APNEA: ICD-10-CM

## 2025-03-10 DIAGNOSIS — G47.00 INSOMNIA W/ SLEEP APNEA: ICD-10-CM

## 2025-03-10 DIAGNOSIS — R79.0 LOW FERRITIN: ICD-10-CM

## 2025-03-10 PROCEDURE — 3017F COLORECTAL CA SCREEN DOC REV: CPT | Performed by: OTOLARYNGOLOGY

## 2025-03-10 PROCEDURE — G8417 CALC BMI ABV UP PARAM F/U: HCPCS | Performed by: OTOLARYNGOLOGY

## 2025-03-10 PROCEDURE — G8427 DOCREV CUR MEDS BY ELIG CLIN: HCPCS | Performed by: OTOLARYNGOLOGY

## 2025-03-10 PROCEDURE — 99204 OFFICE O/P NEW MOD 45 MIN: CPT | Performed by: OTOLARYNGOLOGY

## 2025-03-10 PROCEDURE — 4004F PT TOBACCO SCREEN RCVD TLK: CPT | Performed by: OTOLARYNGOLOGY

## 2025-03-10 ASSESSMENT — SLEEP AND FATIGUE QUESTIONNAIRES
HOW LIKELY ARE YOU TO NOD OFF OR FALL ASLEEP WHEN YOU ARE A PASSENGER IN A CAR FOR AN HOUR WITHOUT A BREAK: WOULD NEVER DOZE
HOW LIKELY ARE YOU TO NOD OFF OR FALL ASLEEP WHILE SITTING AND TALKING TO SOMEONE: WOULD NEVER DOZE
HOW LIKELY ARE YOU TO NOD OFF OR FALL ASLEEP WHILE SITTING QUIETLY AFTER LUNCH WITHOUT ALCOHOL: WOULD NEVER DOZE
HOW LIKELY ARE YOU TO NOD OFF OR FALL ASLEEP IN A CAR, WHILE STOPPED FOR A FEW MINUTES IN TRAFFIC: WOULD NEVER DOZE
ESS TOTAL SCORE: 0
HOW LIKELY ARE YOU TO NOD OFF OR FALL ASLEEP WHILE SITTING AND READING: WOULD NEVER DOZE
HOW LIKELY ARE YOU TO NOD OFF OR FALL ASLEEP WHILE WATCHING TV: WOULD NEVER DOZE
HOW LIKELY ARE YOU TO NOD OFF OR FALL ASLEEP WHILE SITTING INACTIVE IN A PUBLIC PLACE: WOULD NEVER DOZE
HOW LIKELY ARE YOU TO NOD OFF OR FALL ASLEEP WHILE LYING DOWN TO REST IN THE AFTERNOON WHEN CIRCUMSTANCES PERMIT: WOULD NEVER DOZE

## 2025-03-10 ASSESSMENT — PATIENT HEALTH QUESTIONNAIRE - PHQ9
1. LITTLE INTEREST OR PLEASURE IN DOING THINGS: NOT AT ALL
SUM OF ALL RESPONSES TO PHQ QUESTIONS 1-9: 2
SUM OF ALL RESPONSES TO PHQ QUESTIONS 1-9: 2
2. FEELING DOWN, DEPRESSED OR HOPELESS: MORE THAN HALF THE DAYS
SUM OF ALL RESPONSES TO PHQ QUESTIONS 1-9: 2
SUM OF ALL RESPONSES TO PHQ QUESTIONS 1-9: 2

## 2025-03-10 NOTE — PROGRESS NOTES
Diana Holguin presents today for   Chief Complaint   Patient presents with    Snoring    Sleep Problem       Is someone accompanying this pt? no    Is the patient using any DME equipment during OV? no    -DME Company: N/A    Have you ever had a sleep study done before? no    Depression Screening:      3/10/2025    10:27 AM   PHQ-9    Little interest or pleasure in doing things 0   Feeling down, depressed, or hopeless 2   PHQ-2 Score 2   PHQ-9 Total Score 2        [place] Sleepiness Scale:      3/10/2025    10:25 AM   Sleep Medicine   Sitting and reading 0   Watching TV 0   Sitting, inactive in a public place (e.g. a theatre or a meeting) 0   As a passenger in a car for an hour without a break 0   Lying down to rest in the afternoon when circumstances permit 0   Sitting and talking to someone 0   Sitting quietly after a lunch without alcohol 0   In a car, while stopped for a few minutes in traffic 0   [place] Sleepiness Score 0   Neck (Inches) 12       Stop-Bang:      3/10/2025    10:00 AM   STOP-BANG QUESTIONNAIRE   Are you a loud and/or regular snorer? 1   Do you often feel tired or groggy upon awakening or do you awaken with a headache? 1   Have you been observed to gasp or stop breathing during sleep? 0   Are you often tired or fatigued during wake time hours?  0   Do you fall asleep sitting, reading, watching TV or driving? 0   Do you often have problems with memory or concentration? 0   Do you have or are you being treated for high blood pressure? 0   Recent BMI (Calculated) 28.4   Is BMI greater than 35 kg/m2? 0=No   Age older than 50 years old? 1=Yes   Is your neck circumference greater than 17 inches (Male) or 16 inches (Female)? 0   Gender - Male 0=No   STOP-Bang Total Score 31.4         Coordination of Care:  1. Have you been to the ER, urgent care clinic since your last visit? Hospitalized since your last visit? no    2. Have you seen or consulted any other health care providers outside of the [facility] 
comments. Suggest clinical correlation     2d tte 4/12/2023    Left Ventricle: Normal left ventricular systolic function with a visually estimated EF of 55 - 60%. EF by 2D Simpsons Biplane is 55%. Left ventricle size is normal. Normal wall thickness. See diagram for wall motion findings. Normal diastolic function.  ----    Historical Sleep Testing Data: None      Electronically signed by Diana Champagne DO on3/10/2025 at 10:36 AM

## 2025-03-10 NOTE — PATIENT INSTRUCTIONS
Please make a follow up appointment to discuss the results of your sleep study. If this is impossible for some reason, please send me a \"My Chart\" message so that I may get back with you in a timely manner.    [facility] is located in the [facility], adjacent to [facility]. The lab is on the second floor. The direct number to call for sleep study related questions is: [phone].    Please call our clinic back at [phone] or send a message on [facility] [employer] if you have any questions or concerns or if you are experiencing any of the following:     You have not received a follow up appointment within 30 days prior the recommended follow up time.    If you are not tolerating treatment plan and/or not able to obtain equipment or prescribed medication(s).  if you are experiencing any difficulties with the Durable Medical Equipment  (DME) Company you may be using or is assigned to you.  Two weeks have passed and you have not received an appointment for a scheduled procedure.  Two weeks have passed since you underwent a test and/or procedure and you have not received your results.     If you are using a CPAP/BIPAP, or Home Ventilator Device- Please note the following.  Currently, many DMEs are experiencing supply chain difficulties and orders for equipment may be back logged several weeks.     Your  Durable Medical Equipment (DME ) company is supposed to provide you with replacement filters, tubing and masks. You can either call your DME when you need new supplies or you can arrange for an automatic shipment schedule.    Your need to be seen by our office at lat minimum of every 12 months in order to renew the prescription for these supplies.   Please make note of who your DME company is and their phone number.   Please make sure that you clean your mask and hosing on a regular basis.  Your DME can provide you with additional information regarding proper care and cleaning of your

## 2025-03-17 NOTE — PROGRESS NOTES
Patient: Diana Holguin                MRN: [id number]       SSN: [id number]  YOB: 1970        AGE: 54 y.o.        SEX: female      PCP: Magalys Giang MD  03/19/25    Chief Complaint   Patient presents with    Knee Pain     bilateral       HISTORY:  Diana Holguin is a 54 y.o. female who is seen for bilateral knee pain. She presents today for her first injection in the Gelsyn-3 visco supplementation series.    She was previously seen for increased bilateral knee pain.  She successfully completed a bilateral Gelsyn-3 series on 10/16/23 and 6/21/24 but her pains have returned. Her pain has increased within the last month. She denies injury and believes her pain is due to wear and tear.  She feels pain with standing, walking and stair climbing.  She experiences startup pain after sitting.     She was previously seen by Dr. Turcios and Dr. Davis for chronic back and neck pain. She states that she has 5 bulging lumbar discs.      Occupation, etc: Ms. Holguin receives Social Security Disability benefits for fibromyalgia, PTSD and bipolar disorder. 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 She lives with her adult daughter in [place]. She also has adult two sons and three grandchildren. She raised her three children as a single mother.  Wt Readings from Last 3 Encounters:   03/19/25 73.9 kg (163 lb)   03/10/25 71.7 kg (158 lb)   12/25/24 72.6 kg (160 lb)      Body mass index is 29.81 kg/m².    Patient Active Problem List   Diagnosis    Murmur    History of chest pain    Asthma    Cervical spondylosis    Inflammation of sacroiliac joint    Low ferritin    Absolute anemia    Bipolar 1 disorder (HCC)       Social History     Tobacco Use    Smoking status: Former     Current packs/day: 0.00     Average packs/day: 2.5 packs/day for 20.0 years (50.0 ttl pk-yrs)     Types: Cigarettes     Quit date: 2/8/2013     Years since quitting:

## 2025-03-19 ENCOUNTER — OFFICE VISIT (OUTPATIENT)
Age: 55
End: 2025-03-19

## 2025-03-19 VITALS — BODY MASS INDEX: 29.81 KG/M2 | TEMPERATURE: 97.6 F | WEIGHT: 163 LBS

## 2025-03-19 DIAGNOSIS — M17.12 UNILATERAL PRIMARY OSTEOARTHRITIS, LEFT KNEE: Primary | ICD-10-CM

## 2025-03-19 DIAGNOSIS — M17.11 UNILATERAL PRIMARY OSTEOARTHRITIS, RIGHT KNEE: ICD-10-CM

## 2025-03-21 NOTE — PROGRESS NOTES
or  foraminal narrowing.           IMPRESSION:     Multilevel degenerative changes in the cervical spine as above, most pronounced  at C5-C6 where there is mild-to-moderate spinal canal narrowing, severe left and  moderate right foraminal narrowing.     Tiny T2 bright signal abnormality within the cervical spinal cord most  pronounced at the level of C6-C7 (1mm x 1mm in axial dimension), possibly  representing a tiny syrinx.     Cervical XR images taken on 8/2/23 were personally reviewed with patient:  FINDINGS:  Three views of the cervical spine are submitted (AP, lateral and  odontoid views).       - No evidence of acute fracture or subluxation is identified.  - Straightening of the cervical spinal curvature.  - Decreased disc height at C4-5, C5-6 and C6-7 with small endplate osteophytes.  - No significant prevertebral soft tissue edema.     IMPRESSION:     1.  No acute fracture or subluxation.     2.  Degenerative changes.     Lumbar MRI images taken on 8/9/21 personally reviewed with patient:   Vertebral bodies: Lumbar vertebral body height and alignment are within normal limits.   Disc spaces: Unremarkable.   Conus: Terminates at L1.   Soft tissues: Unremarkable.   LEVELS:   L1-2: Unremarkable.   L2-3: Unremarkable.   L3-4: Unremarkable.   L4-5: Mild disc bulge. Moderate facet arthropathy. Mild lateral recess stenosis. Mild bilateral foraminal stenosis. No central canal stenosis. No significant change.    L5-S1: Mild disc bulge. Mild facet arthropathy. No stenosis. Degenerative changes have mildly progressed since prior study.      IMPRESSION:   1. Mild lateral recess stenosis at L4-5.   2. Elsewhere, no high-grade central canal stenosis at any lumbar level.   3. Degenerative changes.      Cervical MRI images taken on 8/10/21 personally reviewed with patient:   Vertebral bodies: Cervical vertebral body height and alignment are within normal limits.   Disc spaces: mild to moderate disc space narrowing at every

## 2025-03-21 NOTE — PROGRESS NOTES
Patient: Diana Holguin                MRN: [id number]       SSN: [id number]  YOB: 1970        AGE: 54 y.o.        SEX: female  Body mass index is 29.81 kg/m².    PCP: Magalys Giang MD  03/26/25    Chief Complaint   Patient presents with    Follow-up     Bilateral knee     HISTORY:  Diana Holguin is a 54 y.o. female who is seen for bilateral knee pain. She presents today for her second injection in the Gelsyn-3 visco supplementation series.      ICD-10-CM    1. Unilateral primary osteoarthritis, left knee  M17.12 DRAIN/INJECT LARGE JOINT/BURSA     sodium hyaluronate (Viscosup) injection SOSY 16.8 mg      2. Unilateral primary osteoarthritis, right knee  M17.11 DRAIN/INJECT LARGE JOINT/BURSA     sodium hyaluronate (Viscosup) injection SOSY 16.8 mg         PROCEDURE:  Ms. Holguin's knees injected with 2 cc of Gelsyn-3.     Chart reviewed for the following:   Puneet LILLY MD, have reviewed the History, Physical and updated the Allergic reactions for Diana Holguin     TIME OUT performed immediately prior to start of procedure:  Puneet LILLY MD, have performed the following reviews on Diana Holguin prior to the start of the procedure:            * Patient was identified by name and date of birth   * Agreement on procedure being performed was verified  * Risks and Benefits explained to the patient  * Procedure site verified and marked as necessary  * Patient was positioned for comfort  * Consent was obtained     Time: 11:20 AM     Date of procedure: 3/26/2025    Procedure performed by:  Puneet Georges MD    Ms. Holguin tolerated the procedure well with no complications.    PLAN:  Ms. Holguin's knees injected with 2 cc of Gelsyn-3. Ms. Holguin will follow up in one week to complete her visco supplementation injection series.    Documentation by aide Flores, as documented by Puneet Georges MD.

## 2025-03-23 ENCOUNTER — HOSPITAL ENCOUNTER (EMERGENCY)
Facility: HOSPITAL | Age: 55
Discharge: HOME | End: 2025-03-24
Attending: STUDENT IN AN ORGANIZED HEALTH CARE EDUCATION/TRAINING PROGRAM
Payer: MEDICARE

## 2025-03-23 ENCOUNTER — APPOINTMENT (OUTPATIENT)
Facility: HOSPITAL | Age: 55
End: 2025-03-23
Payer: MEDICARE

## 2025-03-23 DIAGNOSIS — R07.9 CHEST PAIN, UNSPECIFIED TYPE: Primary | ICD-10-CM

## 2025-03-23 LAB
ANION GAP SERPL CALC-SCNC: 10 MMOL/L (ref 3–18)
BASOPHILS # BLD: 0.04 K/UL (ref 0–0.1)
BASOPHILS NFR BLD: 0.5 % (ref 0–2)
BUN SERPL-MCNC: 12 MG/DL (ref 7–18)
BUN/CREAT SERPL: 15 (ref 12–20)
CALCIUM SERPL-MCNC: 9.2 MG/DL (ref 8.5–10.1)
CHLORIDE SERPL-SCNC: 107 MMOL/L (ref 100–111)
CO2 SERPL-SCNC: 23 MMOL/L (ref 21–32)
CREAT SERPL-MCNC: 0.79 MG/DL (ref 0.6–1.3)
DIFFERENTIAL METHOD BLD: ABNORMAL
EOSINOPHIL # BLD: 0.04 K/UL (ref 0–0.4)
EOSINOPHIL NFR BLD: 0.5 % (ref 0–5)
ERYTHROCYTE [DISTWIDTH] IN BLOOD BY AUTOMATED COUNT: 16 % (ref 11.6–14.5)
FLUAV RNA SPEC QL NAA+PROBE: NOT DETECTED
FLUBV RNA SPEC QL NAA+PROBE: NOT DETECTED
GLUCOSE SERPL-MCNC: 110 MG/DL (ref 74–99)
HCT VFR BLD AUTO: 31.4 % (ref 35–45)
HGB BLD-MCNC: 9.9 G/DL (ref 12–16)
IMM GRANULOCYTES # BLD AUTO: 0.02 K/UL (ref 0–0.04)
IMM GRANULOCYTES NFR BLD AUTO: 0.2 % (ref 0–0.5)
LYMPHOCYTES # BLD: 2.99 K/UL (ref 0.9–3.6)
LYMPHOCYTES NFR BLD: 37.3 % (ref 21–52)
MCH RBC QN AUTO: 24.6 PG (ref 24–34)
MCHC RBC AUTO-ENTMCNC: 31.5 G/DL (ref 31–37)
MCV RBC AUTO: 77.9 FL (ref 78–100)
MONOCYTES # BLD: 0.62 K/UL (ref 0.05–1.2)
MONOCYTES NFR BLD: 7.7 % (ref 3–10)
NEUTS SEG # BLD: 4.3 K/UL (ref 1.8–8)
NEUTS SEG NFR BLD: 53.8 % (ref 40–73)
NRBC # BLD: 0 K/UL (ref 0–0.01)
NRBC BLD-RTO: 0 PER 100 WBC
PLATELET # BLD AUTO: 300 K/UL (ref 135–420)
PMV BLD AUTO: 10.4 FL (ref 9.2–11.8)
POTASSIUM SERPL-SCNC: 3.9 MMOL/L (ref 3.5–5.5)
RBC # BLD AUTO: 4.03 M/UL (ref 4.2–5.3)
SARS-COV-2 RNA RESP QL NAA+PROBE: NOT DETECTED
SODIUM SERPL-SCNC: 140 MMOL/L (ref 136–145)
SOURCE: NORMAL
TROPONIN I SERPL HS-MCNC: 4 NG/L (ref 0–54)
WBC # BLD AUTO: 8 K/UL (ref 4.6–13.2)

## 2025-03-23 PROCEDURE — 85379 FIBRIN DEGRADATION QUANT: CPT

## 2025-03-23 PROCEDURE — 80048 BASIC METABOLIC PNL TOTAL CA: CPT

## 2025-03-23 PROCEDURE — 87636 SARSCOV2 & INF A&B AMP PRB: CPT

## 2025-03-23 PROCEDURE — 99285 EMERGENCY DEPT VISIT HI MDM: CPT

## 2025-03-23 PROCEDURE — 85025 COMPLETE CBC W/AUTO DIFF WBC: CPT

## 2025-03-23 PROCEDURE — 71046 X-RAY EXAM CHEST 2 VIEWS: CPT

## 2025-03-23 PROCEDURE — 93005 ELECTROCARDIOGRAM TRACING: CPT | Performed by: STUDENT IN AN ORGANIZED HEALTH CARE EDUCATION/TRAINING PROGRAM

## 2025-03-23 PROCEDURE — 84484 ASSAY OF TROPONIN QUANT: CPT

## 2025-03-23 ASSESSMENT — PAIN - FUNCTIONAL ASSESSMENT: PAIN_FUNCTIONAL_ASSESSMENT: 0-10

## 2025-03-23 ASSESSMENT — PAIN SCALES - GENERAL: PAINLEVEL_OUTOF10: 7

## 2025-03-23 ASSESSMENT — PAIN DESCRIPTION - DESCRIPTORS: DESCRIPTORS: TIGHTNESS

## 2025-03-23 ASSESSMENT — PAIN DESCRIPTION - LOCATION: LOCATION: CHEST

## 2025-03-23 ASSESSMENT — PAIN DESCRIPTION - PAIN TYPE: TYPE: ACUTE PAIN

## 2025-03-24 VITALS
SYSTOLIC BLOOD PRESSURE: 116 MMHG | HEART RATE: 79 BPM | RESPIRATION RATE: 20 BRPM | BODY MASS INDEX: 32.24 KG/M2 | WEIGHT: 175.2 LBS | OXYGEN SATURATION: 100 % | HEIGHT: 62 IN | TEMPERATURE: 97.9 F | DIASTOLIC BLOOD PRESSURE: 59 MMHG

## 2025-03-24 LAB
D DIMER PPP FEU-MCNC: <0.27 UG/ML(FEU)
TROPONIN I SERPL HS-MCNC: 4 NG/L (ref 0–54)

## 2025-03-24 PROCEDURE — 6360000002 HC RX W HCPCS: Performed by: STUDENT IN AN ORGANIZED HEALTH CARE EDUCATION/TRAINING PROGRAM

## 2025-03-24 PROCEDURE — 84484 ASSAY OF TROPONIN QUANT: CPT

## 2025-03-24 PROCEDURE — 96374 THER/PROPH/DIAG INJ IV PUSH: CPT

## 2025-03-24 PROCEDURE — 6370000000 HC RX 637 (ALT 250 FOR IP): Performed by: STUDENT IN AN ORGANIZED HEALTH CARE EDUCATION/TRAINING PROGRAM

## 2025-03-24 RX ORDER — OXYCODONE AND ACETAMINOPHEN 5; 325 MG/1; MG/1
1 TABLET ORAL
Refills: 0 | Status: COMPLETED | OUTPATIENT
Start: 2025-03-24 | End: 2025-03-24

## 2025-03-24 RX ORDER — ONDANSETRON 2 MG/ML
4 INJECTION INTRAMUSCULAR; INTRAVENOUS
Status: COMPLETED | OUTPATIENT
Start: 2025-03-24 | End: 2025-03-24

## 2025-03-24 RX ADMIN — ONDANSETRON 4 MG: 2 INJECTION, SOLUTION INTRAMUSCULAR; INTRAVENOUS at 01:05

## 2025-03-24 RX ADMIN — OXYCODONE HYDROCHLORIDE AND ACETAMINOPHEN 1 TABLET: 5; 325 TABLET ORAL at 01:04

## 2025-03-24 ASSESSMENT — PAIN DESCRIPTION - DESCRIPTORS: DESCRIPTORS: ACHING;TIGHTNESS

## 2025-03-24 ASSESSMENT — PAIN DESCRIPTION - ORIENTATION: ORIENTATION: OTHER (COMMENT)

## 2025-03-24 ASSESSMENT — PAIN SCALES - GENERAL
PAINLEVEL_OUTOF10: 0
PAINLEVEL_OUTOF10: 6

## 2025-03-24 ASSESSMENT — PAIN - FUNCTIONAL ASSESSMENT: PAIN_FUNCTIONAL_ASSESSMENT: ACTIVITIES ARE NOT PREVENTED

## 2025-03-24 ASSESSMENT — PAIN DESCRIPTION - LOCATION: LOCATION: CHEST;BACK

## 2025-03-24 NOTE — DISCHARGE INSTRUCTIONS
Follow-up with your PCP and cardiology.  Get outpatient stress test as discussed. Come back to emergency department for worsening symptoms, chest pain, shortness with, dizziness, passing out, abdominal pain, nausea, or any other concerns.

## 2025-03-24 NOTE — ED NOTES
AT bedside pt reports midsternal CP radiates to back and left side neck and chest onset yesterday/ last night. Pt also endorses nausea and 1 episode of vomiting

## 2025-03-24 NOTE — ED NOTES
At bedside pt to discharge awaiting ride , unable to operate motor vehicle after narcotics .pt verbalized understanding and agrees with plan

## 2025-03-24 NOTE — ED NOTES
At bedside pt resting on stretcher   respirations even and unlabored at this time. Cardiac monitor in place, VSS , NAD noted at this time. Pt allergies verified pt medicated per MAR, pt tolerating Po intake w/o incident.. Light dimmed for comfort and blanket provided

## 2025-03-24 NOTE — ED NOTES
Started an IV, sent blood work   EKG done   Sent covid swab   Changed pt into gown  Connected pt into monitor   Bed in lowest position and pt has call bell

## 2025-03-24 NOTE — ED PROVIDER NOTES
EMERGENCY DEPARTMENT HISTORY AND PHYSICAL EXAM      Date: 3/23/2025  Patient Name: Diana Holguin    History of Presenting Illness     Chief Complaint   Patient presents with    Chest Pain    Back Pain    Neck Pain    Vomiting       Patient is a 54-year-old female with past medical history of bipolar disorder, HLD and anxiety/depression who presents for substernal chest tightness x 1 day.  Describes as nonexertional, pleuritic sharp pain that radiates to the back. Associated symptoms include shortness of breath and vomiting.  Denies fever, abdominal pain or any other concerns.  Denies prior DVT/PE or cocaine use.    Patient had a nuclear stress test on 4/12/23 which was overall reassuring.          PCP: Magalys Giang MD    No current facility-administered medications for this encounter.     Current Outpatient Medications   Medication Sig Dispense Refill    atorvastatin (LIPITOR) 20 MG tablet TAKE 1 TABLET BY MOUTH DAILY 90 tablet 0    famotidine (PEPCID) 20 MG tablet Take 1 tablet by mouth 2 times daily (Patient not taking: Reported on 3/23/2025) 24 tablet 1    ketorolac (TORADOL) 10 MG tablet Take 1 tablet by mouth every 6 hours as needed for Pain (Patient not taking: Reported on 3/23/2025) 15 tablet 0    methocarbamol (ROBAXIN-750) 750 MG tablet Take 1 tablet by mouth nightly as needed (back pain) 14 tablet 0    ferrous sulfate (FE TABS) 325 (65 Fe) MG EC tablet Take 1 tablet by mouth 2 times daily (Patient not taking: Reported on 3/23/2025) 90 tablet 3    fluticasone furoate-vilanterol (BREO ELLIPTA) 100-25 MCG/ACT inhaler Inhale 1 puff into the lungs daily 1 each 5    methocarbamol (ROBAXIN) 500 MG tablet Take 1 tablet by mouth 3 times daily      albuterol sulfate HFA (VENTOLIN HFA) 108 (90 Base) MCG/ACT inhaler Inhale 2 puffs into the lungs 4 times daily as needed for Wheezing 54 g 5    ibuprofen (ADVIL;MOTRIN) 600 MG tablet Take 1 tablet by mouth 3 times daily as needed for Pain (Patient not taking:

## 2025-03-24 NOTE — ED TRIAGE NOTES
Pt c/o chest tightness since waking this morning. Pt endorses pain \"in the center of the back and left side of the neck\".  Pt states she vomited x 1 episode upon arrival

## 2025-03-25 ENCOUNTER — OFFICE VISIT (OUTPATIENT)
Age: 55
End: 2025-03-25
Payer: MEDICARE

## 2025-03-25 VITALS
WEIGHT: 163.4 LBS | HEART RATE: 84 BPM | RESPIRATION RATE: 18 BRPM | DIASTOLIC BLOOD PRESSURE: 59 MMHG | TEMPERATURE: 97.1 F | SYSTOLIC BLOOD PRESSURE: 104 MMHG | HEIGHT: 62 IN | BODY MASS INDEX: 30.07 KG/M2

## 2025-03-25 DIAGNOSIS — M79.18 CHRONIC PRIMARY MUSCULOSKELETAL PAIN: Primary | ICD-10-CM

## 2025-03-25 DIAGNOSIS — M54.12 CERVICAL RADICULOPATHY: ICD-10-CM

## 2025-03-25 DIAGNOSIS — M47.816 LUMBAR FACET ARTHROPATHY: ICD-10-CM

## 2025-03-25 DIAGNOSIS — M79.18 CERVICAL MYOFASCIAL PAIN SYNDROME: ICD-10-CM

## 2025-03-25 DIAGNOSIS — G89.29 CHRONIC PRIMARY MUSCULOSKELETAL PAIN: Primary | ICD-10-CM

## 2025-03-25 DIAGNOSIS — M54.50 LUMBAR PAIN: ICD-10-CM

## 2025-03-25 DIAGNOSIS — M48.02 CERVICAL SPINAL STENOSIS: ICD-10-CM

## 2025-03-25 LAB
EKG ATRIAL RATE: 83 BPM
EKG DIAGNOSIS: NORMAL
EKG P AXIS: 41 DEGREES
EKG P-R INTERVAL: 152 MS
EKG Q-T INTERVAL: 370 MS
EKG QRS DURATION: 86 MS
EKG QTC CALCULATION (BAZETT): 434 MS
EKG R AXIS: -9 DEGREES
EKG T AXIS: 33 DEGREES
EKG VENTRICULAR RATE: 83 BPM

## 2025-03-25 PROCEDURE — G8417 CALC BMI ABV UP PARAM F/U: HCPCS | Performed by: PHYSICAL MEDICINE & REHABILITATION

## 2025-03-25 PROCEDURE — G8427 DOCREV CUR MEDS BY ELIG CLIN: HCPCS | Performed by: PHYSICAL MEDICINE & REHABILITATION

## 2025-03-25 PROCEDURE — 99214 OFFICE O/P EST MOD 30 MIN: CPT | Performed by: PHYSICAL MEDICINE & REHABILITATION

## 2025-03-25 PROCEDURE — 93010 ELECTROCARDIOGRAM REPORT: CPT | Performed by: INTERNAL MEDICINE

## 2025-03-25 PROCEDURE — 4004F PT TOBACCO SCREEN RCVD TLK: CPT | Performed by: PHYSICAL MEDICINE & REHABILITATION

## 2025-03-25 PROCEDURE — 3017F COLORECTAL CA SCREEN DOC REV: CPT | Performed by: PHYSICAL MEDICINE & REHABILITATION

## 2025-03-25 ASSESSMENT — ENCOUNTER SYMPTOMS
VOMITING: 0
WHEEZING: 0
BACK PAIN: 1
TROUBLE SWALLOWING: 0
NAUSEA: 0
SHORTNESS OF BREATH: 0

## 2025-03-26 ENCOUNTER — OFFICE VISIT (OUTPATIENT)
Age: 55
End: 2025-03-26

## 2025-03-26 VITALS — WEIGHT: 163 LBS | BODY MASS INDEX: 30 KG/M2 | HEIGHT: 62 IN

## 2025-03-26 DIAGNOSIS — M17.12 UNILATERAL PRIMARY OSTEOARTHRITIS, LEFT KNEE: Primary | ICD-10-CM

## 2025-03-26 DIAGNOSIS — M17.11 UNILATERAL PRIMARY OSTEOARTHRITIS, RIGHT KNEE: ICD-10-CM

## 2025-03-31 NOTE — PROGRESS NOTES
Patient: Diana Holguin                MRN: [id number]       SSN: [id number]  YOB: 1970        AGE: 55 y.o.        SEX: female  There is no height or weight on file to calculate BMI.    PCP: Magalys Giang MD  04/03/25    Chief Complaint   Patient presents with    Knee Pain     bilateral     HISTORY:  Diana Holguin is a 55 y.o. female who is seen for bilateral knee pain. She presents today for her third injection in the Gelsyn-3 visco supplementation series.    PROCEDURE:  Ms. Espinoza knees injected with 2 cc of Gelsyn-3.     TIME OUT performed immediately prior to start of procedure:  I, Puneet Georges MD, have performed the following reviews on Diana Holguin prior to the start of the procedure:            * Patient was identified by name and date of birth   * Agreement on procedure being performed was verified  * Risks and Benefits explained to the patient  * Procedure site verified and marked as necessary  * Patient was positioned for comfort  * Consent was obtained     Time: 9:40 AM     Date of procedure: 4/3/2025    Procedure performed by:  Puneet Georges MD    Ms. Holguin tolerated the procedure well with no complications      ICD-10-CM    1. Unilateral primary osteoarthritis, left knee  M17.12 DRAIN/INJECT LARGE JOINT/BURSA     sodium hyaluronate (Viscosup) injection SOSY 16.8 mg      2. Unilateral primary osteoarthritis, right knee  M17.11 DRAIN/INJECT LARGE JOINT/BURSA     sodium hyaluronate (Viscosup) injection SOSY 16.8 mg        PLAN: Ms. Espinoza knees injected with 2 cc of Gelsyn-3. Ms. Holguin will follow up PRN now that she has completed her visco supplementation injection series.     Documentation by aide Flores, as documented by Puneet Georges MD.

## 2025-04-03 ENCOUNTER — OFFICE VISIT (OUTPATIENT)
Age: 55
End: 2025-04-03

## 2025-04-03 DIAGNOSIS — M17.12 UNILATERAL PRIMARY OSTEOARTHRITIS, LEFT KNEE: Primary | ICD-10-CM

## 2025-04-03 DIAGNOSIS — M17.11 UNILATERAL PRIMARY OSTEOARTHRITIS, RIGHT KNEE: ICD-10-CM

## 2025-04-11 ENCOUNTER — TELEPHONE (OUTPATIENT)
Age: 55
End: 2025-04-11

## 2025-04-11 DIAGNOSIS — M54.12 CERVICAL RADICULOPATHY: Primary | ICD-10-CM

## 2025-04-11 DIAGNOSIS — M47.816 LUMBAR FACET ARTHROPATHY: ICD-10-CM

## 2025-04-11 DIAGNOSIS — F31.9 BIPOLAR 1 DISORDER (HCC): ICD-10-CM

## 2025-04-11 DIAGNOSIS — F43.10 PTSD (POST-TRAUMATIC STRESS DISORDER): ICD-10-CM

## 2025-04-11 DIAGNOSIS — M48.02 CERVICAL SPINAL STENOSIS: ICD-10-CM

## 2025-04-11 RX ORDER — DULOXETIN HYDROCHLORIDE 30 MG/1
30 CAPSULE, DELAYED RELEASE ORAL DAILY
Qty: 90 CAPSULE | Refills: 0 | Status: SHIPPED | OUTPATIENT
Start: 2025-04-11

## 2025-04-11 NOTE — TELEPHONE ENCOUNTER
Yes, Dr. Turcios was able to speak to Dr. Neff. He will be prescribing Cymbalta 30 mg #90  with no refills.       Called patient [phone] and verified her name an date of birth. I informed her of the providers message. I verified her pharmacy. Patient stated she uses [facility]. I informed her the medication has been sent to [facility]. Patient verbalized understanding and no further action needed at this time.

## 2025-04-11 NOTE — TELEPHONE ENCOUNTER
Patient called to follow up on the Cymbalta discussed at her last visit. She wanted to know if Dr. Neff had said it was okay or not.    Please review and advise patient, [phone]

## 2025-04-21 ENCOUNTER — HOSPITAL ENCOUNTER (OUTPATIENT)
Dept: SLEEP MEDICINE | Facility: HOSPITAL | Age: 55
Discharge: HOME | End: 2025-04-24
Attending: OTOLARYNGOLOGY
Payer: MEDICARE

## 2025-04-21 DIAGNOSIS — G47.10 HYPERSOMNIA, UNSPECIFIED: ICD-10-CM

## 2025-04-21 DIAGNOSIS — R29.818 SUSPECTED SLEEP APNEA: ICD-10-CM

## 2025-04-21 PROCEDURE — 95800 SLP STDY UNATTENDED: CPT

## 2025-04-25 PROBLEM — R06.83 SNORING: Status: ACTIVE | Noted: 2025-04-25

## 2025-05-01 RX ORDER — SODIUM CHLORIDE 0.9 % (FLUSH) 0.9 %
10 SYRINGE (ML) INJECTION PRN
COMMUNITY
Start: 2024-10-29

## 2025-05-01 RX ORDER — DICLOFENAC SODIUM 75 MG/1
75 TABLET, DELAYED RELEASE ORAL 2 TIMES DAILY
COMMUNITY
Start: 2025-03-10

## 2025-05-01 RX ORDER — LAMOTRIGINE 25 MG/1
25 TABLET ORAL DAILY
COMMUNITY
Start: 2025-03-31

## 2025-05-01 RX ORDER — BETAMETHASONE SODIUM PHOSPHATE AND BETAMETHASONE ACETATE 3; 3 MG/ML; MG/ML
0.25 INJECTION, SUSPENSION INTRA-ARTICULAR; INTRALESIONAL; INTRAMUSCULAR; SOFT TISSUE ONCE
COMMUNITY
Start: 2024-10-29

## 2025-05-02 ENCOUNTER — OFFICE VISIT (OUTPATIENT)
Age: 55
End: 2025-05-02

## 2025-05-02 VITALS
SYSTOLIC BLOOD PRESSURE: 159 MMHG | WEIGHT: 158.4 LBS | DIASTOLIC BLOOD PRESSURE: 63 MMHG | RESPIRATION RATE: 18 BRPM | OXYGEN SATURATION: 100 % | BODY MASS INDEX: 28.97 KG/M2 | HEART RATE: 82 BPM | TEMPERATURE: 97.9 F

## 2025-05-02 DIAGNOSIS — F17.200 CURRENT SMOKER: ICD-10-CM

## 2025-05-02 DIAGNOSIS — R06.00 DYSPNEA, UNSPECIFIED TYPE: Primary | ICD-10-CM

## 2025-05-02 DIAGNOSIS — Z87.891 PERSONAL HISTORY OF TOBACCO USE: ICD-10-CM

## 2025-05-02 ASSESSMENT — ENCOUNTER SYMPTOMS
RHINORRHEA: 0
BLOOD IN STOOL: 0
DIARRHEA: 0
EYE PAIN: 0
BACK PAIN: 1
VOICE CHANGE: 0
EYE REDNESS: 0
VOMITING: 0
ABDOMINAL PAIN: 0
STRIDOR: 0
SHORTNESS OF BREATH: 1
EYE ITCHING: 0
APNEA: 0
NAUSEA: 0
COLOR CHANGE: 0
PHOTOPHOBIA: 0
SORE THROAT: 0
CHOKING: 0
COUGH: 0
CONSTIPATION: 0
WHEEZING: 0
TROUBLE SWALLOWING: 0
EYE DISCHARGE: 0
CHEST TIGHTNESS: 0

## 2025-05-02 NOTE — PROGRESS NOTES
Diana Holguin presents today for   Chief Complaint   Patient presents with    Follow-up     Dyspnea       Is someone accompanying this pt? No    Is the patient using any DME equipment during OV? No    -DME Company NA    Depression Screening:        3/10/2025    10:27 AM   PHQ-9 Questionaire   Little interest or pleasure in doing things 0   Feeling down, depressed, or hopeless 2   PHQ-9 Total Score 2       Learning Needs Questionnaire:     No question data found.      Fall Risk:         3/10/2025    10:27 AM 2024     9:11 AM 2024     1:14 PM 2024    12:34 PM   Fall Risk   Do you feel unsteady or are you worried about falling?  no yes yes no   2 or more falls in past year? no no yes no   Fall with injury in past year? no no no no        Abuse Screenin/20/2024    10:00 AM   AMB Abuse Screening   Do you ever feel afraid of your partner? N   Are you in a relationship with someone who physically or mentally threatens you? N   Is it safe for you to go home? Y         Coordination of Care:    1. Have you been to the ER, urgent care clinic since your last visit? Hospitalized since your last visit? No    2. Have you seen or consulted any other health care providers outside of the [facility] since your last visit? Include any pap smears or colon screening. No    Medication list has been update per patient.        
year.  Stop screening once a person has not smoked for 15 years or has a health problem that limits life expectancy or the ability to have lung surgery.    The patient  reports that she quit smoking about 12 years ago. Her smoking use included cigarettes. She has a 50 pack-year smoking history. She has never been exposed to tobacco smoke. She uses smokeless tobacco.. Discussed with patient the risks and benefits of screening, including over-diagnosis, false positive rate, and total radiation exposure.  The patient currently exhibits no signs or symptoms suggestive of lung cancer.  Discussed with patient the importance of compliance with yearly annual lung cancer screenings and willingness to undergo diagnosis and treatment if screening scan is positive.  In addition, the patient was counseled regarding the importance of remaining smoke free and/or total smoking cessation.    Also reviewed the following if the patient has Medicare that as of February 10, 2022, Medicare only covers LDCT screening in patients aged 50-77 with at least a 20 pack-year smoking history who currently smoke or have quit in the last 15 years

## 2025-05-07 ENCOUNTER — OFFICE VISIT (OUTPATIENT)
Age: 55
End: 2025-05-07
Payer: MEDICARE

## 2025-05-07 VITALS
SYSTOLIC BLOOD PRESSURE: 102 MMHG | HEIGHT: 62 IN | OXYGEN SATURATION: 99 % | DIASTOLIC BLOOD PRESSURE: 66 MMHG | BODY MASS INDEX: 29.3 KG/M2 | HEART RATE: 64 BPM | WEIGHT: 159.2 LBS

## 2025-05-07 DIAGNOSIS — R00.2 PALPITATIONS: ICD-10-CM

## 2025-05-07 DIAGNOSIS — Z87.898 HISTORY OF CHEST PAIN: Primary | ICD-10-CM

## 2025-05-07 DIAGNOSIS — Z72.0 TOBACCO ABUSE: ICD-10-CM

## 2025-05-07 DIAGNOSIS — R06.09 DYSPNEA ON EXERTION: ICD-10-CM

## 2025-05-07 DIAGNOSIS — E78.49 OTHER HYPERLIPIDEMIA: ICD-10-CM

## 2025-05-07 PROCEDURE — 4004F PT TOBACCO SCREEN RCVD TLK: CPT | Performed by: INTERNAL MEDICINE

## 2025-05-07 PROCEDURE — 99214 OFFICE O/P EST MOD 30 MIN: CPT | Performed by: INTERNAL MEDICINE

## 2025-05-07 PROCEDURE — G8428 CUR MEDS NOT DOCUMENT: HCPCS | Performed by: INTERNAL MEDICINE

## 2025-05-07 PROCEDURE — 3017F COLORECTAL CA SCREEN DOC REV: CPT | Performed by: INTERNAL MEDICINE

## 2025-05-07 PROCEDURE — G8417 CALC BMI ABV UP PARAM F/U: HCPCS | Performed by: INTERNAL MEDICINE

## 2025-05-07 NOTE — PATIENT INSTRUCTIONS
Learning About the Mediterranean Diet  What is the Mediterranean diet?     The Mediterranean diet is a style of eating rather than a diet plan. It features foods eaten in [place], [place], southern [place], and other countries along the [place] Sea. It emphasizes eating foods like fish, fruits, vegetables, beans, high-fiber breads and whole grains, nuts, and olive oil. This style of eating includes limited red meat, cheese, and sweets.  Why choose the Mediterranean diet?  A Mediterranean-style diet may improve heart health. It contains more fat than other heart-healthy diets. But the fats are mainly from nuts, unsaturated oils (such as fish oils and olive oil), and certain nut or seed oils (such as canola, soybean, or flaxseed oil). These fats may help protect the heart and blood vessels.  How can you get started on the Mediterranean diet?  Here are some things you can do to switch to a more Mediterranean way of eating.  What to eat  Eat a variety of fruits and vegetables each day, such as grapes, blueberries, tomatoes, broccoli, peppers, figs, olives, spinach, eggplant, beans, lentils, and chickpeas.  Eat a variety of whole-grain foods each day, such as oats, brown rice, and whole wheat bread, pasta, and couscous.  Eat fish at least 2 times a week. Try tuna, salmon, mackerel, lake trout, herring, or sardines.  Eat moderate amounts of low-fat dairy products, such as milk, cheese, or yogurt.  Eat moderate amounts of poultry and eggs.  Choose healthy (unsaturated) fats, such as nuts, olive oil, and certain nut or seed oils like canola, soybean, and flaxseed.  Limit unhealthy (saturated) fats, such as butter, palm oil, and coconut oil. And limit fats found in animal products, such as meat and dairy products made with whole milk. Try to eat red meat only a few times a month in very small amounts.  Limit sweets and desserts to only a few times a week. This includes sugar-sweetened drinks like soda.  The

## 2025-05-07 NOTE — PROGRESS NOTES
Have you had Fatigue?  No     2.   Have you had have you had Chest Pain? No     3.   Have you had Dyspnea (SOB) ? Yes   4.   Have you had Orthopnea? No     5.   Have you had PND? No   6.   Have you had leg swelling? No   7.    Have you had any weight gain? No     8. Have you had any palpitations? No      9. Have you had any syncope? No   10. Do you have any wounds on legs?no

## 2025-05-11 DIAGNOSIS — E78.2 MIXED HYPERLIPIDEMIA: ICD-10-CM

## 2025-05-12 RX ORDER — ATORVASTATIN CALCIUM 20 MG/1
20 TABLET, FILM COATED ORAL DAILY
Qty: 90 TABLET | Refills: 3 | Status: SHIPPED | OUTPATIENT
Start: 2025-05-12

## 2025-05-12 NOTE — TELEPHONE ENCOUNTER
This patient contacted the office for the following prescriptions to be refilled:    Medication requested :   Requested Prescriptions     Pending Prescriptions Disp Refills    atorvastatin (LIPITOR) 20 MG tablet [Pharmacy Med Name: Atorvastatin Calcium 20 MG Oral Tablet] 90 tablet 3     Sig: TAKE 1 TABLET BY MOUTH DAILY        Last Refilled: 1/28/2025    Last Office Visit: 11/22/2024    Next Office Visit: 5/22/2025    Last Labs:6/13/2024

## 2025-05-13 NOTE — PROGRESS NOTES
degenerative changes in the cervical spine as above, most pronounced  at C5-C6 where there is mild-to-moderate spinal canal narrowing, severe left and  moderate right foraminal narrowing.     Tiny T2 bright signal abnormality within the cervical spinal cord most  pronounced at the level of C6-C7 (1mm x 1mm in axial dimension), possibly  representing a tiny syrinx.     Cervical XR images taken on 8/2/23 were personally reviewed with patient:  FINDINGS:  Three views of the cervical spine are submitted (AP, lateral and  odontoid views).       - No evidence of acute fracture or subluxation is identified.  - Straightening of the cervical spinal curvature.  - Decreased disc height at C4-5, C5-6 and C6-7 with small endplate osteophytes.  - No significant prevertebral soft tissue edema.     IMPRESSION:     1.  No acute fracture or subluxation.     2.  Degenerative changes.     Lumbar MRI images taken on 8/9/21 personally reviewed with patient:   Vertebral bodies: Lumbar vertebral body height and alignment are within normal limits.   Disc spaces: Unremarkable.   Conus: Terminates at L1.   Soft tissues: Unremarkable.   LEVELS:   L1-2: Unremarkable.   L2-3: Unremarkable.   L3-4: Unremarkable.   L4-5: Mild disc bulge. Moderate facet arthropathy. Mild lateral recess stenosis. Mild bilateral foraminal stenosis. No central canal stenosis. No significant change.    L5-S1: Mild disc bulge. Mild facet arthropathy. No stenosis. Degenerative changes have mildly progressed since prior study.      IMPRESSION:   1. Mild lateral recess stenosis at L4-5.   2. Elsewhere, no high-grade central canal stenosis at any lumbar level.   3. Degenerative changes.      Cervical MRI images taken on 8/10/21 personally reviewed with patient:   Vertebral bodies: Cervical vertebral body height and alignment are within normal limits.   Disc spaces: mild to moderate disc space narrowing at every cervical disc level. Multilevel small anterior osteophytes.

## 2025-05-14 ENCOUNTER — SCHEDULED TELEPHONE ENCOUNTER (OUTPATIENT)
Age: 55
End: 2025-05-14
Payer: MEDICARE

## 2025-05-14 DIAGNOSIS — M48.02 CERVICAL SPINAL STENOSIS: ICD-10-CM

## 2025-05-14 DIAGNOSIS — M47.816 LUMBAR FACET ARTHROPATHY: ICD-10-CM

## 2025-05-14 DIAGNOSIS — M54.12 CERVICAL RADICULOPATHY: ICD-10-CM

## 2025-05-14 DIAGNOSIS — M79.18 CERVICAL MYOFASCIAL PAIN SYNDROME: ICD-10-CM

## 2025-05-14 DIAGNOSIS — M79.18 CHRONIC PRIMARY MUSCULOSKELETAL PAIN: Primary | ICD-10-CM

## 2025-05-14 DIAGNOSIS — M54.50 LUMBAR PAIN: ICD-10-CM

## 2025-05-14 DIAGNOSIS — G89.29 CHRONIC PRIMARY MUSCULOSKELETAL PAIN: Primary | ICD-10-CM

## 2025-05-14 PROCEDURE — 99213 OFFICE O/P EST LOW 20 MIN: CPT | Performed by: PHYSICAL MEDICINE & REHABILITATION

## 2025-05-14 ASSESSMENT — ENCOUNTER SYMPTOMS
NAUSEA: 0
SHORTNESS OF BREATH: 0
BACK PAIN: 1
TROUBLE SWALLOWING: 0
VOMITING: 0
WHEEZING: 0

## (undated) DEVICE — Device

## (undated) DEVICE — CATH IV SAFE STR 22GX1IN BLU -- PROTECTIV PLUS